# Patient Record
Sex: MALE | Race: WHITE | NOT HISPANIC OR LATINO | Employment: OTHER | URBAN - METROPOLITAN AREA
[De-identification: names, ages, dates, MRNs, and addresses within clinical notes are randomized per-mention and may not be internally consistent; named-entity substitution may affect disease eponyms.]

---

## 2023-11-22 ENCOUNTER — APPOINTMENT (OUTPATIENT)
Dept: MRI IMAGING | Facility: HOSPITAL | Age: 63
DRG: 304 | End: 2023-11-22
Payer: COMMERCIAL

## 2023-11-22 ENCOUNTER — APPOINTMENT (OUTPATIENT)
Dept: CT IMAGING | Facility: HOSPITAL | Age: 63
DRG: 304 | End: 2023-11-22
Payer: COMMERCIAL

## 2023-11-22 ENCOUNTER — HOSPITAL ENCOUNTER (INPATIENT)
Facility: HOSPITAL | Age: 63
LOS: 5 days | Discharge: HOME | DRG: 304 | End: 2023-11-27
Attending: EMERGENCY MEDICINE | Admitting: HOSPITALIST
Payer: COMMERCIAL

## 2023-11-22 DIAGNOSIS — R90.0 INTRACRANIAL MASS: ICD-10-CM

## 2023-11-22 DIAGNOSIS — H53.9 VISUAL DISTURBANCE: ICD-10-CM

## 2023-11-22 DIAGNOSIS — I16.1 HYPERTENSIVE EMERGENCY: ICD-10-CM

## 2023-11-22 DIAGNOSIS — I10 SEVERE HYPERTENSION: Primary | ICD-10-CM

## 2023-11-22 PROBLEM — G93.6 BRAIN EDEMA: Status: ACTIVE | Noted: 2023-11-22

## 2023-11-22 PROBLEM — G93.5 BRAIN COMPRESSION: Status: ACTIVE | Noted: 2023-11-22

## 2023-11-22 PROBLEM — G93.89 BRAIN MASS: Status: ACTIVE | Noted: 2023-11-22

## 2023-11-22 PROBLEM — E87.6 HYPOKALEMIA: Status: ACTIVE | Noted: 2023-11-22

## 2023-11-22 PROBLEM — Z91.199 MEDICALLY NONCOMPLIANT: Status: ACTIVE | Noted: 2023-11-22

## 2023-11-22 LAB
ALBUMIN SERPL-MCNC: 4.7 G/DL (ref 3.5–5.2)
ALBUMIN/GLOB SERPL: 2.8 G/DL
ALP SERPL-CCNC: 78 U/L (ref 39–117)
ALT SERPL W P-5'-P-CCNC: 21 U/L (ref 1–41)
ANION GAP SERPL CALCULATED.3IONS-SCNC: 11 MMOL/L (ref 5–15)
APTT PPP: 29.8 SECONDS (ref 22.7–35.4)
AST SERPL-CCNC: 19 U/L (ref 1–40)
BACTERIA UR QL AUTO: ABNORMAL /HPF
BASOPHILS # BLD AUTO: 0.07 10*3/MM3 (ref 0–0.2)
BASOPHILS NFR BLD AUTO: 1.5 % (ref 0–1.5)
BILIRUB SERPL-MCNC: 0.8 MG/DL (ref 0–1.2)
BILIRUB UR QL STRIP: NEGATIVE
BUN SERPL-MCNC: 14 MG/DL (ref 8–23)
BUN/CREAT SERPL: 13.6 (ref 7–25)
CALCIUM SPEC-SCNC: 8.9 MG/DL (ref 8.6–10.5)
CHLORIDE SERPL-SCNC: 105 MMOL/L (ref 98–107)
CLARITY UR: CLEAR
CO2 SERPL-SCNC: 23 MMOL/L (ref 22–29)
COLOR UR: YELLOW
CREAT SERPL-MCNC: 1.03 MG/DL (ref 0.76–1.27)
DEPRECATED RDW RBC AUTO: 41.8 FL (ref 37–54)
EGFRCR SERPLBLD CKD-EPI 2021: 82.1 ML/MIN/1.73
EOSINOPHIL # BLD AUTO: 0.16 10*3/MM3 (ref 0–0.4)
EOSINOPHIL NFR BLD AUTO: 3.4 % (ref 0.3–6.2)
ERYTHROCYTE [DISTWIDTH] IN BLOOD BY AUTOMATED COUNT: 13.3 % (ref 12.3–15.4)
GLOBULIN UR ELPH-MCNC: 1.7 GM/DL
GLUCOSE SERPL-MCNC: 97 MG/DL (ref 65–99)
GLUCOSE UR STRIP-MCNC: NEGATIVE MG/DL
HCT VFR BLD AUTO: 43.7 % (ref 37.5–51)
HGB BLD-MCNC: 14.8 G/DL (ref 13–17.7)
HGB UR QL STRIP.AUTO: ABNORMAL
HYALINE CASTS UR QL AUTO: ABNORMAL /LPF
IMM GRANULOCYTES # BLD AUTO: 0.01 10*3/MM3 (ref 0–0.05)
IMM GRANULOCYTES NFR BLD AUTO: 0.2 % (ref 0–0.5)
INR PPP: 0.94 (ref 0.9–1.1)
KETONES UR QL STRIP: NEGATIVE
LEUKOCYTE ESTERASE UR QL STRIP.AUTO: ABNORMAL
LYMPHOCYTES # BLD AUTO: 0.96 10*3/MM3 (ref 0.7–3.1)
LYMPHOCYTES NFR BLD AUTO: 20.4 % (ref 19.6–45.3)
MAGNESIUM SERPL-MCNC: 2.2 MG/DL (ref 1.6–2.4)
MCH RBC QN AUTO: 29.3 PG (ref 26.6–33)
MCHC RBC AUTO-ENTMCNC: 33.9 G/DL (ref 31.5–35.7)
MCV RBC AUTO: 86.5 FL (ref 79–97)
MONOCYTES # BLD AUTO: 0.45 10*3/MM3 (ref 0.1–0.9)
MONOCYTES NFR BLD AUTO: 9.6 % (ref 5–12)
NEUTROPHILS NFR BLD AUTO: 3.06 10*3/MM3 (ref 1.7–7)
NEUTROPHILS NFR BLD AUTO: 64.9 % (ref 42.7–76)
NITRITE UR QL STRIP: NEGATIVE
NRBC BLD AUTO-RTO: 0 /100 WBC (ref 0–0.2)
PH UR STRIP.AUTO: 7 [PH] (ref 5–8)
PLATELET # BLD AUTO: 175 10*3/MM3 (ref 140–450)
PMV BLD AUTO: 10.2 FL (ref 6–12)
POTASSIUM SERPL-SCNC: 3.3 MMOL/L (ref 3.5–5.2)
PROT SERPL-MCNC: 6.4 G/DL (ref 6–8.5)
PROT UR QL STRIP: NEGATIVE
PROTHROMBIN TIME: 12.7 SECONDS (ref 11.7–14.2)
QT INTERVAL: 424 MS
QTC INTERVAL: 490 MS
RBC # BLD AUTO: 5.05 10*6/MM3 (ref 4.14–5.8)
RBC # UR STRIP: ABNORMAL /HPF
REF LAB TEST METHOD: ABNORMAL
SODIUM SERPL-SCNC: 139 MMOL/L (ref 136–145)
SP GR UR STRIP: 1.01 (ref 1–1.03)
SQUAMOUS #/AREA URNS HPF: ABNORMAL /HPF
T4 FREE SERPL-MCNC: 0.94 NG/DL (ref 0.93–1.7)
TROPONIN T SERPL HS-MCNC: 8 NG/L
TSH SERPL DL<=0.05 MIU/L-ACNC: 3.96 UIU/ML (ref 0.27–4.2)
UROBILINOGEN UR QL STRIP: ABNORMAL
WBC # UR STRIP: ABNORMAL /HPF
WBC NRBC COR # BLD AUTO: 4.71 10*3/MM3 (ref 3.4–10.8)

## 2023-11-22 PROCEDURE — 84439 ASSAY OF FREE THYROXINE: CPT | Performed by: EMERGENCY MEDICINE

## 2023-11-22 PROCEDURE — 85730 THROMBOPLASTIN TIME PARTIAL: CPT | Performed by: EMERGENCY MEDICINE

## 2023-11-22 PROCEDURE — 85610 PROTHROMBIN TIME: CPT | Performed by: EMERGENCY MEDICINE

## 2023-11-22 PROCEDURE — 70553 MRI BRAIN STEM W/O & W/DYE: CPT

## 2023-11-22 PROCEDURE — 80050 GENERAL HEALTH PANEL: CPT | Performed by: EMERGENCY MEDICINE

## 2023-11-22 PROCEDURE — 84484 ASSAY OF TROPONIN QUANT: CPT | Performed by: EMERGENCY MEDICINE

## 2023-11-22 PROCEDURE — 36415 COLL VENOUS BLD VENIPUNCTURE: CPT

## 2023-11-22 PROCEDURE — 99285 EMERGENCY DEPT VISIT HI MDM: CPT

## 2023-11-22 PROCEDURE — A9577 INJ MULTIHANCE: HCPCS | Performed by: HOSPITALIST

## 2023-11-22 PROCEDURE — 99222 1ST HOSP IP/OBS MODERATE 55: CPT | Performed by: STUDENT IN AN ORGANIZED HEALTH CARE EDUCATION/TRAINING PROGRAM

## 2023-11-22 PROCEDURE — 70450 CT HEAD/BRAIN W/O DYE: CPT

## 2023-11-22 PROCEDURE — 99222 1ST HOSP IP/OBS MODERATE 55: CPT | Performed by: NURSE PRACTITIONER

## 2023-11-22 PROCEDURE — 0 GADOBENATE DIMEGLUMINE 529 MG/ML SOLUTION: Performed by: HOSPITALIST

## 2023-11-22 PROCEDURE — 81001 URINALYSIS AUTO W/SCOPE: CPT | Performed by: EMERGENCY MEDICINE

## 2023-11-22 PROCEDURE — 87186 SC STD MICRODIL/AGAR DIL: CPT | Performed by: INTERNAL MEDICINE

## 2023-11-22 PROCEDURE — 87086 URINE CULTURE/COLONY COUNT: CPT | Performed by: INTERNAL MEDICINE

## 2023-11-22 PROCEDURE — 87077 CULTURE AEROBIC IDENTIFY: CPT | Performed by: INTERNAL MEDICINE

## 2023-11-22 PROCEDURE — 93005 ELECTROCARDIOGRAM TRACING: CPT | Performed by: EMERGENCY MEDICINE

## 2023-11-22 PROCEDURE — 83735 ASSAY OF MAGNESIUM: CPT | Performed by: EMERGENCY MEDICINE

## 2023-11-22 PROCEDURE — 93010 ELECTROCARDIOGRAM REPORT: CPT | Performed by: INTERNAL MEDICINE

## 2023-11-22 PROCEDURE — 25010000002 LABETALOL 5 MG/ML SOLUTION: Performed by: EMERGENCY MEDICINE

## 2023-11-22 PROCEDURE — 25010000002 DEXAMETHASONE PER 1 MG: Performed by: NURSE PRACTITIONER

## 2023-11-22 RX ORDER — ONDANSETRON 2 MG/ML
4 INJECTION INTRAMUSCULAR; INTRAVENOUS EVERY 6 HOURS PRN
Status: DISCONTINUED | OUTPATIENT
Start: 2023-11-22 | End: 2023-11-27 | Stop reason: HOSPADM

## 2023-11-22 RX ORDER — NITROGLYCERIN 0.4 MG/1
0.4 TABLET SUBLINGUAL
Status: DISCONTINUED | OUTPATIENT
Start: 2023-11-22 | End: 2023-11-27 | Stop reason: HOSPADM

## 2023-11-22 RX ORDER — UREA 10 %
3 LOTION (ML) TOPICAL NIGHTLY PRN
Status: DISCONTINUED | OUTPATIENT
Start: 2023-11-22 | End: 2023-11-27 | Stop reason: HOSPADM

## 2023-11-22 RX ORDER — LABETALOL HYDROCHLORIDE 5 MG/ML
10 INJECTION, SOLUTION INTRAVENOUS
Status: DISCONTINUED | OUTPATIENT
Start: 2023-11-22 | End: 2023-11-27 | Stop reason: HOSPADM

## 2023-11-22 RX ORDER — AMLODIPINE BESYLATE 5 MG/1
5 TABLET ORAL
Status: DISCONTINUED | OUTPATIENT
Start: 2023-11-22 | End: 2023-11-25

## 2023-11-22 RX ORDER — FAMOTIDINE 10 MG/ML
20 INJECTION, SOLUTION INTRAVENOUS EVERY 12 HOURS SCHEDULED
Status: DISCONTINUED | OUTPATIENT
Start: 2023-11-22 | End: 2023-11-27 | Stop reason: HOSPADM

## 2023-11-22 RX ORDER — ACETAMINOPHEN 325 MG/1
650 TABLET ORAL EVERY 4 HOURS PRN
Status: DISCONTINUED | OUTPATIENT
Start: 2023-11-22 | End: 2023-11-27 | Stop reason: HOSPADM

## 2023-11-22 RX ORDER — LEVETIRACETAM 500 MG/1
500 TABLET ORAL 2 TIMES DAILY
Status: DISCONTINUED | OUTPATIENT
Start: 2023-11-22 | End: 2023-11-27 | Stop reason: HOSPADM

## 2023-11-22 RX ORDER — DEXAMETHASONE SODIUM PHOSPHATE 4 MG/ML
4 INJECTION, SOLUTION INTRA-ARTICULAR; INTRALESIONAL; INTRAMUSCULAR; INTRAVENOUS; SOFT TISSUE EVERY 6 HOURS
Status: DISCONTINUED | OUTPATIENT
Start: 2023-11-22 | End: 2023-11-27 | Stop reason: HOSPADM

## 2023-11-22 RX ORDER — LABETALOL HYDROCHLORIDE 5 MG/ML
10 INJECTION, SOLUTION INTRAVENOUS ONCE
Status: COMPLETED | OUTPATIENT
Start: 2023-11-22 | End: 2023-11-22

## 2023-11-22 RX ORDER — POTASSIUM CHLORIDE 750 MG/1
40 TABLET, FILM COATED, EXTENDED RELEASE ORAL ONCE
Status: COMPLETED | OUTPATIENT
Start: 2023-11-22 | End: 2023-11-22

## 2023-11-22 RX ORDER — ONDANSETRON 4 MG/1
4 TABLET, FILM COATED ORAL EVERY 6 HOURS PRN
Status: DISCONTINUED | OUTPATIENT
Start: 2023-11-22 | End: 2023-11-27 | Stop reason: HOSPADM

## 2023-11-22 RX ADMIN — FAMOTIDINE 20 MG: 10 INJECTION INTRAVENOUS at 22:54

## 2023-11-22 RX ADMIN — AMLODIPINE BESYLATE 5 MG: 5 TABLET ORAL at 18:25

## 2023-11-22 RX ADMIN — LABETALOL HYDROCHLORIDE 10 MG: 5 INJECTION, SOLUTION INTRAVENOUS at 11:48

## 2023-11-22 RX ADMIN — DEXAMETHASONE SODIUM PHOSPHATE 4 MG: 4 INJECTION, SOLUTION INTRA-ARTICULAR; INTRALESIONAL; INTRAMUSCULAR; INTRAVENOUS; SOFT TISSUE at 22:54

## 2023-11-22 RX ADMIN — POTASSIUM CHLORIDE 40 MEQ: 750 TABLET, EXTENDED RELEASE ORAL at 18:25

## 2023-11-22 RX ADMIN — GADOBENATE DIMEGLUMINE 20 ML: 529 INJECTION, SOLUTION INTRAVENOUS at 15:55

## 2023-11-22 RX ADMIN — DEXAMETHASONE SODIUM PHOSPHATE 4 MG: 4 INJECTION, SOLUTION INTRA-ARTICULAR; INTRALESIONAL; INTRAMUSCULAR; INTRAVENOUS; SOFT TISSUE at 18:25

## 2023-11-22 RX ADMIN — LEVETIRACETAM 500 MG: 500 TABLET, FILM COATED ORAL at 21:24

## 2023-11-22 NOTE — ED NOTES
"Nursing report ED to floor  Chato Potts  63 y.o.  male    HPI :   Chief Complaint   Patient presents with    Blurred Vision       Admitting doctor:   Jeremias Anglin MD    Admitting diagnosis:   The primary encounter diagnosis was Severe hypertension. Diagnoses of Intracranial mass and Visual disturbance were also pertinent to this visit.    Code status:   Current Code Status       Date Active Code Status Order ID Comments User Context       Not on file            Allergies:   Patient has no known allergies.    Isolation:   No active isolations    Intake and Output  No intake or output data in the 24 hours ending 11/22/23 1429    Weight:       11/22/23  1033   Weight: 102 kg (225 lb)       Most recent vitals:   Vitals:    11/22/23 1033 11/22/23 1044 11/22/23 1148 11/22/23 1317   BP:  (!) 205/128 (!) 196/123 (!) 173/112   Patient Position:  Lying     Pulse: 109  97 77   Resp: 19   16   Temp: 97.9 °F (36.6 °C)      TempSrc: Tympanic      SpO2: 97%   95%   Weight: 102 kg (225 lb)      Height: 188 cm (74\")          Active LDAs/IV Access:   Lines, Drains & Airways       Active LDAs       Name Placement date Placement time Site Days    Peripheral IV 11/22/23 1145 Anterior;Distal;Right;Upper Arm 11/22/23  1145  Arm  less than 1                    Labs (abnormal labs have a star):   Labs Reviewed   COMPREHENSIVE METABOLIC PANEL - Abnormal; Notable for the following components:       Result Value    Potassium 3.3 (*)     All other components within normal limits    Narrative:     GFR Normal >60  Chronic Kidney Disease <60  Kidney Failure <15     URINALYSIS W/ MICROSCOPIC IF INDICATED (NO CULTURE) - Abnormal; Notable for the following components:    Blood, UA Trace (*)     Leuk Esterase, UA Moderate (2+) (*)     All other components within normal limits   URINALYSIS, MICROSCOPIC ONLY - Abnormal; Notable for the following components:    WBC, UA Too Numerous to Count (*)     Bacteria, UA 2+ (*)     All other components within " normal limits   PROTIME-INR - Normal   APTT - Normal   SINGLE HSTROPONIN T - Normal    Narrative:     High Sensitive Troponin T Reference Range:  <14.0 ng/L- Negative Female for AMI  <22.0 ng/L- Negative Male for AMI  >=14 - Abnormal Female indicating possible myocardial injury.  >=22 - Abnormal Male indicating possible myocardial injury.   Clinicians would have to utilize clinical acumen, EKG, Troponin, and serial changes to determine if it is an Acute Myocardial Infarction or myocardial injury due to an underlying chronic condition.        MAGNESIUM - Normal   TSH - Normal   T4, FREE - Normal    Narrative:     Results may be falsely increased if patient taking Biotin.     CBC WITH AUTO DIFFERENTIAL - Normal   CBC AND DIFFERENTIAL    Narrative:     The following orders were created for panel order CBC & Differential.  Procedure                               Abnormality         Status                     ---------                               -----------         ------                     CBC Auto Differential[[id number]]        Normal              Final result                 Please view results for these tests on the individual orders.       EKG:   ECG 12 Lead Stroke Evaluation   Preliminary Result   HEART RATE= 80  bpm   RR Interval= 750  ms   CT Interval= 180  ms   P Horizontal Axis= -67  deg   P Front Axis= 18  deg   QRSD Interval= 151  ms   QT Interval= 424  ms   QTcB= 490  ms   QRS Axis= -17  deg   T Wave Axis= 1  deg   - ABNORMAL ECG -   Sinus rhythm   Probable left atrial enlargement   Right bundle branch block   Electronically Signed By:    Date and Time of Study: 2023-11-22 11:57:29      SCANNED - TELEMETRY     Final Result          Meds given in ED:   Medications   iopamidol (ISOVUE-370) 76 % injection 95 mL (has no administration in time range)   labetalol (NORMODYNE,TRANDATE) injection 10 mg (10 mg Intravenous Given 11/22/23 1148)       Imaging results:  CT Head Without Contrast    Result Date:  11/22/2023  1. Large left parieto-occipital supratentorial hyperdense and partially calcified mass as discussed above. 2. This mass could conceivably represent a meningioma posterior arising from the tentorium or falx but further evaluation recommended with MRI of the brain with contrast. 3. Also correlation to any prior outside studies would be helpful. 4. Findings were discussed with the ER physician.  Radiation dose reduction techniques were utilized, including automated exposure control and exposure modulation based on body size.        Ambulatory status:   - indep    Social issues:   Social History     Socioeconomic History    Marital status: Single   Tobacco Use    Smoking status: Never    Smokeless tobacco: Never   Substance and Sexual Activity    Alcohol use: Never    Drug use: Never    Sexual activity: Defer       NIH Stroke Scale:       Esthela New RN  11/22/23 14:29 EST

## 2023-11-22 NOTE — CONSULTS
"Neurology Consult Note    Consult Date: 11/22/2023    Referring MD: No ref. provider found    Reason for Consult I have been asked to see the patient in neurological consultation to render advice and opinion regarding blurry vision in both eyes    Chato Potts is a 63 y.o. male with no past medical history and does not take any medications.  Patient has been having intermittent blurry vision in both eyes for the past several weeks plan for an ophthalmology evaluation and was found to have bilateral papilledema.  He was told to go to the ER for further evaluation of his vision problems.  He states at present he does not have any blurry vision but he has episodic blurry vision in both eyes.     He denies any headache, weakness numbness or speech problems  Never had a stroke or a TIA  He never had blood clots in his legs or chest    History reviewed. No pertinent past medical history.    Exam  BP (!) 205/128 (Patient Position: Lying)   Pulse 109   Temp 97.9 °F (36.6 °C) (Tympanic)   Resp 19   Ht 188 cm (74\")   Wt 102 kg (225 lb)   SpO2 97%   BMI 28.89 kg/m²   Gen: NAD, vitals reviewed  MS: oriented x3, recent/remote memory intact, normal attention/concentration, language intact, no neglect.  CN: Right eye superior nasal quadrant decreased visual acuity and left eye nasal quadrants decreased visual acuity, PERRL, EOMI, no facial droop, no dysarthria  Motor: 5/5 throughout upper and lower extremities, normal tone    DATA:    No results found for: \"GLUCOSE\", \"CALCIUM\", \"NA\", \"K\", \"CO2\", \"CL\", \"BUN\", \"CREATININE\", \"EGFRIFAFRI\", \"EGFRIFNONA\", \"BCR\", \"ANIONGAP\"  No results found for: \"WBC\", \"HGB\", \"HCT\", \"MCV\", \"PLT\"    Lab review:   Sodium 139  Creatinine 1.03  Normal LFTs    TSH 3.96    WBC 4.71  Hemoglobin 14.8 and platelets 175      Imaging review:     MRI brain-large partially calcified dural based mass in the left parietal and occipital lobes    Diagnoses:  Large partially calcified dural based mass on the left " bilateral occipital lobes  Papilledema  Hypertensive urgency    Pre-stroke MRS: 0  NIHSS: 1    Patient is a 63-year-old with no past medical history was having ongoing episodes of episodic blurry vision for which she got evaluated by ophthalmologist who noticed papilledema and advised him to go to the ER for further evaluation he got an MRI brain with and without it shows a large dural based calcified mass in the left parietal occipital lobe.    His blood pressure also seems to be elevated and he was complaining of burning micturition and had a urine analysis which was positive on admission.    Plan  -Systolic blood pressure goal less than 150  -Neurosurgery has been consulted and started on Keppra prophylaxis for 500 mg twice daily and dexamethasone 4 mg every 6  -CT abdomen pelvis with chest with and without contrast has been ordered.      No further workup from neurology will sign off, kindly let us know if any further questions      MDM   Reviewed: Previous charts, nursing notes and vitals   Reviewed: Previous labs and CT scan and MRI scan   Interpretation: Labs and CT scan and MRI scan  Total time providing care is :30-74 minutes. This excluded time spent performing separately reportable procedures and services  Consults :Neurology/Stroke    Please note that portions of this note were completed with a voice recognition program.     Pierre Rondon MD  Neuro Hospitalist /Vascular Neurology.

## 2023-11-22 NOTE — PLAN OF CARE
Problem: Adult Inpatient Plan of Care  Goal: Plan of Care Review  11/22/2023 1817 by Francesca Pringle RN  Outcome: Ongoing, Progressing  Flowsheets (Taken 11/22/2023 1817)  Plan of Care Reviewed With:   patient   spouse  11/22/2023 1713 by Francesca Pringle RN  Flowsheets (Taken 11/22/2023 1713)  Plan of Care Reviewed With:   patient   spouse  Goal: Patient-Specific Goal (Individualized)  Recent Flowsheet Documentation  Taken 11/22/2023 1753 by Francesca Pringle RN  Patient-Specific Goals (Include Timeframe): go home  Individualized Care Needs: to be self sufficient  Anxieties, Fears or Concerns: no  11/22/2023 1713 by Francesca Pringle RN  Outcome: Ongoing, Progressing  Goal: Optimal Comfort and Wellbeing  Outcome: Ongoing, Progressing   Goal Outcome Evaluation:  Plan of Care Reviewed With: patient, spouse

## 2023-11-22 NOTE — PLAN OF CARE
Problem: Adult Inpatient Plan of Care  Goal: Patient-Specific Goal (Individualized)  Recent Flowsheet Documentation  Taken 11/22/2023 1753 by Francesca Pringle, RN  Patient-Specific Goals (Include Timeframe): go home  Individualized Care Needs: to be self sufficient  Anxieties, Fears or Concerns: no  11/22/2023 1713 by Francesca Pringle, RN  Outcome: Ongoing, Progressing   Goal Outcome Evaluation:  Plan of Care Reviewed With: patient, spouse

## 2023-11-22 NOTE — ED PROVIDER NOTES
EMERGENCY DEPARTMENT ENCOUNTER    Room Number:  18/18  PCP: Provider, No Known  Historian: Patient      HPI:  Chief Complaint: Intermittent blurry vision, hypertension  A complete HPI/ROS/PMH/PSH/SH/FH are unobtainable due to: None    Context: Chato Potts is a 63 y.o. male who presents to the ED after being referred from eye doctor for intermittent blurry vision for the last month and high blood pressure.  He was told by the eye doctor that did not have any abnormal exam findings but they are worried that potentially he could have nerve swelling although I did not visualize this in the office.  They told him they were worried about potentially having cancer.  Patient denies any headaches, dizziness, chest pains, shortness of breath, numbness or weakness of the arms or legs, balance issues.  No recent falls or head injuries.  Not on any current medications.      MEDICAL RECORD REVIEW    External (non-ED) record review: No prior charts for review in epic    PAST MEDICAL HISTORY  Active Ambulatory Problems     Diagnosis Date Noted    No Active Ambulatory Problems     Resolved Ambulatory Problems     Diagnosis Date Noted    No Resolved Ambulatory Problems     No Additional Past Medical History         PAST SURGICAL HISTORY  History reviewed. No pertinent surgical history.      FAMILY HISTORY  History reviewed. No pertinent family history.      SOCIAL HISTORY  Social History     Socioeconomic History    Marital status: Single   Tobacco Use    Smoking status: Never    Smokeless tobacco: Never   Substance and Sexual Activity    Alcohol use: Never    Drug use: Never    Sexual activity: Defer         ALLERGIES  Patient has no known allergies.        REVIEW OF SYSTEMS  Review of Systems     All systems reviewed and negative except for those discussed in HPI.       PHYSICAL EXAM    I have reviewed the triage vital signs and nursing notes.    ED Triage Vitals   Temp Heart Rate Resp BP SpO2   11/22/23 1033 11/22/23 1033 11/22/23  1033 11/22/23 1044 11/22/23 1033   97.9 °F (36.6 °C) 109 19 (!) 205/128 97 %      Temp src Heart Rate Source Patient Position BP Location FiO2 (%)   11/22/23 1033 11/22/23 1033 11/22/23 1044 -- --   Tympanic Monitor Lying         Physical Exam  General: No acute distress, nontoxic  HEENT: Mucous membranes moist, atraumatic, EOMI, PERRL  Neck: Full ROM  Pulm: Symmetric chest rise, nonlabored, lungs CTAB  Cardiovascular: Regular rate and rhythm, intact distal pulses  GI: Soft, nontender, nondistended, no rebound, no guarding, bowel sounds present  MSK: Full ROM, no deformity  Skin: Warm, dry  Neuro: Awake, alert, oriented x 4, GCS 15, no facial droop, no dysarthria or aphasia, 5 out of 5 strength sensation bilateral upper and lower extremities, no pronator drift moving all extremities, no focal deficits  Psych: Calm, cooperative      NIH 0    LAB RESULTS  Recent Results (from the past 24 hour(s))   Comprehensive Metabolic Panel    Collection Time: 11/22/23 11:29 AM    Specimen: Blood   Result Value Ref Range    Glucose 97 65 - 99 mg/dL    BUN 14 8 - 23 mg/dL    Creatinine 1.03 0.76 - 1.27 mg/dL    Sodium 139 136 - 145 mmol/L    Potassium 3.3 (L) 3.5 - 5.2 mmol/L    Chloride 105 98 - 107 mmol/L    CO2 23.0 22.0 - 29.0 mmol/L    Calcium 8.9 8.6 - 10.5 mg/dL    Total Protein 6.4 6.0 - 8.5 g/dL    Albumin 4.7 3.5 - 5.2 g/dL    ALT (SGPT) 21 1 - 41 U/L    AST (SGOT) 19 1 - 40 U/L    Alkaline Phosphatase 78 39 - 117 U/L    Total Bilirubin 0.8 0.0 - 1.2 mg/dL    Globulin 1.7 gm/dL    A/G Ratio 2.8 g/dL    BUN/Creatinine Ratio 13.6 7.0 - 25.0    Anion Gap 11.0 5.0 - 15.0 mmol/L    eGFR 82.1 >60.0 mL/min/1.73   Protime-INR    Collection Time: 11/22/23 11:29 AM    Specimen: Blood   Result Value Ref Range    Protime 12.7 11.7 - 14.2 Seconds    INR 0.94 0.90 - 1.10   aPTT    Collection Time: 11/22/23 11:29 AM    Specimen: Blood   Result Value Ref Range    PTT 29.8 22.7 - 35.4 seconds   Single High Sensitivity Troponin T     Collection Time: 11/22/23 11:29 AM    Specimen: Blood   Result Value Ref Range    HS Troponin T 8 <22 ng/L   Magnesium    Collection Time: 11/22/23 11:29 AM    Specimen: Blood   Result Value Ref Range    Magnesium 2.2 1.6 - 2.4 mg/dL   TSH    Collection Time: 11/22/23 11:29 AM    Specimen: Blood   Result Value Ref Range    TSH 3.960 0.270 - 4.200 uIU/mL   T4, Free    Collection Time: 11/22/23 11:29 AM    Specimen: Blood   Result Value Ref Range    Free T4 0.94 0.93 - 1.70 ng/dL   CBC Auto Differential    Collection Time: 11/22/23 11:29 AM    Specimen: Blood   Result Value Ref Range    WBC 4.71 3.40 - 10.80 10*3/mm3    RBC 5.05 4.14 - 5.80 10*6/mm3    Hemoglobin 14.8 13.0 - 17.7 g/dL    Hematocrit 43.7 37.5 - 51.0 %    MCV 86.5 79.0 - 97.0 fL    MCH 29.3 26.6 - 33.0 pg    MCHC 33.9 31.5 - 35.7 g/dL    RDW 13.3 12.3 - 15.4 %    RDW-SD 41.8 37.0 - 54.0 fl    MPV 10.2 6.0 - 12.0 fL    Platelets 175 140 - 450 10*3/mm3    Neutrophil % 64.9 42.7 - 76.0 %    Lymphocyte % 20.4 19.6 - 45.3 %    Monocyte % 9.6 5.0 - 12.0 %    Eosinophil % 3.4 0.3 - 6.2 %    Basophil % 1.5 0.0 - 1.5 %    Immature Grans % 0.2 0.0 - 0.5 %    Neutrophils, Absolute 3.06 1.70 - 7.00 10*3/mm3    Lymphocytes, Absolute 0.96 0.70 - 3.10 10*3/mm3    Monocytes, Absolute 0.45 0.10 - 0.90 10*3/mm3    Eosinophils, Absolute 0.16 0.00 - 0.40 10*3/mm3    Basophils, Absolute 0.07 0.00 - 0.20 10*3/mm3    Immature Grans, Absolute 0.01 0.00 - 0.05 10*3/mm3    nRBC 0.0 0.0 - 0.2 /100 WBC   ECG 12 Lead Stroke Evaluation    Collection Time: 11/22/23 11:57 AM   Result Value Ref Range    QT Interval 424 ms    QTC Interval 490 ms   Urinalysis With Microscopic If Indicated (No Culture) - Urine, Clean Catch    Collection Time: 11/22/23  1:18 PM    Specimen: Urine, Clean Catch   Result Value Ref Range    Color, UA Yellow Yellow, Straw    Appearance, UA Clear Clear    pH, UA 7.0 5.0 - 8.0    Specific Gravity, UA 1.008 1.005 - 1.030    Glucose, UA Negative Negative     Ketones, UA Negative Negative    Bilirubin, UA Negative Negative    Blood, UA Trace (A) Negative    Protein, UA Negative Negative    Leuk Esterase, UA Moderate (2+) (A) Negative    Nitrite, UA Negative Negative    Urobilinogen, UA 0.2 E.U./dL 0.2 - 1.0 E.U./dL   Urinalysis, Microscopic Only - Urine, Clean Catch    Collection Time: 11/22/23  1:18 PM    Specimen: Urine, Clean Catch   Result Value Ref Range    RBC, UA 0-2 None Seen, 0-2 /HPF    WBC, UA Too Numerous to Count (A) None Seen, 0-2 /HPF    Bacteria, UA 2+ (A) None Seen /HPF    Squamous Epithelial Cells, UA None Seen None Seen, 0-2 /HPF    Hyaline Casts, UA None Seen None Seen /LPF    Methodology Manual Light Microscopy        Ordered the above labs and independently interpreted results. My findings will be discussed in the medical decision making section below        RADIOLOGY  CT Head Without Contrast    Result Date: 11/22/2023  CT OF THE BRAIN WITHOUT CONTRAST 11/22/2023  HISTORY: Blurred vision. Headache.  Axial images were obtained through the brain without intravenous contrast.  There is a large hyperdense and partially calcified left supratentorial mass which abuts the posterior falx and leftward aspect of the tentorium. The mass is seen in the posterior parieto-occipital region. It is difficult to determine if it is extra-axial or intra-axial. The mass measures up to 7.6 cm in craniocaudal dimension x 7.4 cm in AP dimension x 5.9 cm in transverse dimension. Small amount of surrounding edema is seen.  No other masses are seen. There is approximately 8 mm midline shift to the right. No intracranial hemorrhage is seen.  Ventricles do not appear significantly dilated. No bony lesions are seen.      1. Large left parieto-occipital supratentorial hyperdense and partially calcified mass as discussed above. 2. This mass could conceivably represent a meningioma posterior arising from the tentorium or falx but further evaluation recommended with MRI of the  brain with contrast. 3. Also correlation to any prior outside studies would be helpful. 4. Findings were discussed with the ER physician.  Radiation dose reduction techniques were utilized, including automated exposure control and exposure modulation based on body size.        Ordered the above noted radiological studies.  Independently interpreted by me and my independent review of findings can be found in the ED Course.  See dictation for official radiology interpretation.      PROCEDURES    Critical Care    Performed by: Greg Person MD  Authorized by: Greg Person MD    Critical care provider statement:     Critical care time (minutes):  35    Critical care time was exclusive of:  Separately billable procedures and treating other patients    Critical care was necessary to treat or prevent imminent or life-threatening deterioration of the following conditions:  CNS failure or compromise and circulatory failure    Critical care was time spent personally by me on the following activities:  Development of treatment plan with patient or surrogate, discussions with consultants, evaluation of patient's response to treatment, examination of patient, obtaining history from patient or surrogate, ordering and performing treatments and interventions, ordering and review of laboratory studies, ordering and review of radiographic studies, pulse oximetry, re-evaluation of patient's condition and review of old charts    Care discussed with: admitting provider      Care discussed with comment:  Dr. Anglin, admitting; Dr. Rondon, Neurology; Dr. Lane, Radiology; JACQUELINE Harvey, Neurosurgery    EKG - Per my independent interpretation at 12:00:    EKG Time: 1157  Rhythm/Rate: Sinus rhythm with rate of 80  Left axis deviation  Right bundle branch block  No acute ischemic changes  No STEMI       No prior for visual comparison      MEDICATIONS GIVEN IN ER    Medications   iopamidol (ISOVUE-370) 76 % injection 95 mL (has  no administration in time range)   labetalol (NORMODYNE,TRANDATE) injection 10 mg (10 mg Intravenous Given 11/22/23 1148)         PROGRESS, DATA ANALYSIS, CONSULTS, AND MEDICAL DECISION MAKING    Please note that this section constitutes my independent interpretation of clinical data including lab results, radiology, EKG's.  This constitutes my independent professional opinion regarding differential diagnosis and management of this patient.  It may include any factors such as history from outside sources, review of external records, social determinants of health, management of medications, response to those treatments, and discussions with other providers.    Differential Diagnosis and Plan: Plan for stat neurology consult, likely neuroimaging of some sort, labs, blood pressure control, EKG, and reevaluation with results.  Initial concern for possible hypertensive urgency, intracranial mass, renal failure, electrolyte abnormalities, among others.      Additional sources:  - Discussed/ obtained information from independent historians:       - Chronic or social conditions impacting care:      - Shared decision making:  Patient fully updated on and in agreement with the course and plan moving forward    ED Course as of 11/22/23 1428   Wed Nov 22, 2023   1121 Discussed with Dr. Rondon, Stroke Neurology, discussed patient's clinical course and findings today, treatment modalities, he does recommend blood pressure control with IV labetalol, MRI brain without contrast, and if MR brain is reassuring with improving blood pressure and otherwise reassuring work-up, can discharge with likely hypertensive induced symptoms. [DC]   1152 WBC: 4.71 [DC]   1152 Hemoglobin: 14.8 [DC]   1152 Platelets: 175 [DC]   1205 Glucose: 97 [DC]   1205 BUN: 14 [DC]   1205 Creatinine: 1.03 [DC]   1205 Sodium: 139 [DC]   1206 Potassium(!): 3.3 [DC]   1206 ALT (SGPT): 21 [DC]   1206 AST (SGOT): 19 [DC]   1206 Alkaline Phosphatase: 78 [DC]   1206  Total Bilirubin: 0.8 [DC]   1206 INR: 0.94 [DC]   1206 PTT: 29.8 [DC]   1206 Magnesium: 2.2 [DC]   1214 Free T4: 0.94 [DC]   1214 TSH Baseline: 3.960 [DC]   1214 HS Troponin T: 8 [DC]   1404 CT Head Without Contrast  Per my independent interpretation of the CT head, patient has a large left-sided intracranial mass [DC]   1409 Discussed with JACQUELINE Harvey, neurosurgery taking call for Dr. Rodriguez, she has reviewed the CT head, recommends MR brain with and without contrast and they will consult. [DC]   1426 Discussed with Dr. Anglin, [facility], discussed patient's clinical course and findings today, treatment modalities, consultations by neurology and neurosurgery, and need for hospitalization. [DC]      ED Course User Index  [DC] Greg Person MD     Patient and family at bedside been fully updated on findings today and recommendations and need for hospitalization for further blood pressure management and work-up of the intracranial mass.  All questions and concerns addressed.  He remains well-appearing in no acute distress otherwise.    Hospitalization Considered?: yes    Orders Placed During This Visit:  Orders Placed This Encounter   Procedures    CT Head Without Contrast    MRI Brain With & Without Contrast    Comprehensive Metabolic Panel    Urinalysis With Microscopic If Indicated (No Culture) - Urine, Clean Catch    Protime-INR    aPTT    Single High Sensitivity Troponin T    Magnesium    TSH    T4, Free    CBC Auto Differential    Urinalysis, Microscopic Only - Urine, Clean Catch    Inpatient Neurology Consult Stroke    Neurosurgery (on-call MD unless specified)    [facility] (on-call MD unless specified) Details    ECG 12 Lead Stroke Evaluation    SCANNED - TELEMETRY      Inpatient Admission    CBC & Differential       Additional orders considered but not placed:      Independent interpretation of labs, radiology studies, and discussions with consultants: See ED Course        AS OF 14:28 EST VITALS:    BP - (!)  173/112  HR - 77  TEMP - 97.9 °F (36.6 °C) (Tympanic)  02 SATS - 95%        DIAGNOSIS  Final diagnoses:   Severe hypertension   Intracranial mass   Visual disturbance         DISPOSITION  HOSPITALIZATION    Discussed treatment plan and reason for hospitalization with pt/family and hospitalizing physician.  Pt/family voiced understanding of the plan for hospitalization for further testing/treatment as needed.                 --    Please note that portions of this were completed with a voice recognition program.       Note Disclaimer: At [facility], we believe that sharing information builds trust and better relationships. You are receiving this note because you are receiving care at [facility] or recently visited. It is possible you will see health information before a provider has talked with you about it. This kind of information can be easy to misunderstand. To help you fully understand what it means for your health, we urge you to discuss this note with your provider.           Greg Person MD  11/22/23 3770

## 2023-11-22 NOTE — CASE MANAGEMENT/SOCIAL WORK
ER provider notified patient's insurance is out of network with [facility]. CCP to follow up should patient require admission to the hospital today. WOO PeresN RN

## 2023-11-22 NOTE — CASE MANAGEMENT/SOCIAL WORK
In speaking with ER physician regarding admission today, patient with newly diagnosed large posterior brain mass with the possibility of midline shift. Patient is not appropriate to consider transfer at this time. DIEGO Peres RN

## 2023-11-22 NOTE — PLAN OF CARE
Problem: Adult Inpatient Plan of Care  Goal: Patient-Specific Goal (Individualized)  Outcome: Ongoing, Progressing  Goal: Optimal Comfort and Wellbeing  Outcome: Ongoing, Progressing   Goal Outcome Evaluation:  Plan of Care Reviewed With: patient, spouse

## 2023-11-22 NOTE — ED NOTES
Pt states that he has had bilateral intermittent blurred vision for the last month. States that his vision will be blurred for a few seconds and then clear up. Pt went to his eye doctor this morning and they told him to come here for possible swelling of his optic nerve.

## 2023-11-22 NOTE — H&P
"    Patient Name:  Chato Potts  YOB: 1960  MRN:  [id number]  Admit Date:  11/22/2023  Patient Care Team:  Provider, No Known as PCP - General      Subjective   History Present Illness     Chief Complaint   Patient presents with    Blurred Vision       Mr. Potts is a 63 y.o. male with a history of told he has high blood pressure that presents to [facility] complaining of altered vision.  He initially was seen at an ophthalmologist who states that they saw something concerning and is asked to send him to the emergency department to be evaluated.  He has had altered vision for about the last 4 weeks.  He states that it comes and goes.  No particular aggravating or relieving factors.  No other neurological deficits.  No complaints of headache, nausea or vomiting.  No weight loss.  He has previously been diagnosed with hypertension but noncompliant with his medications because he \"feels fine.\"      Review of Systems   Constitutional: Negative.    HENT: Negative.     Eyes:  Positive for visual disturbance.   Respiratory: Negative.     Gastrointestinal: Negative.    Endocrine: Negative.    Genitourinary: Negative.    Musculoskeletal: Negative.    Skin: Negative.    Neurological: Negative.    Hematological: Negative.    Psychiatric/Behavioral: Negative.          Personal History     History reviewed. No pertinent past medical history.  History reviewed. No pertinent surgical history.  History reviewed. No pertinent family history.  Social History     Tobacco Use    Smoking status: Never    Smokeless tobacco: Never   Substance Use Topics    Alcohol use: Never    Drug use: Never     No current facility-administered medications on file prior to encounter.     No current outpatient medications on file prior to encounter.     No Known Allergies    Objective    Objective     Vital Signs  Temp:  [97.9 °F (36.6 °C)] 97.9 °F (36.6 °C)  Heart Rate:  [] 77  Resp:  [16-19] 16  BP: (173-205)/(112-128) " 173/112  SpO2:  [95 %-97 %] 95 %  on   ;   Device (Oxygen Therapy): room air  Body mass index is 28.89 kg/m².    Physical Exam  Vitals and nursing note reviewed.   Constitutional:       Appearance: He is well-developed.   HENT:      Head: Normocephalic and atraumatic.      Nose:      Comments: Skin growth on bridge of nose  Eyes:      General: No scleral icterus.  Cardiovascular:      Rate and Rhythm: Normal rate and regular rhythm.   Pulmonary:      Effort: Pulmonary effort is normal.      Breath sounds: Normal breath sounds.   Abdominal:      General: Bowel sounds are normal. There is no distension.      Palpations: Abdomen is soft.      Tenderness: There is no abdominal tenderness.   Skin:     General: Skin is warm and dry.   Neurological:      Mental Status: He is alert. Mental status is at baseline.         Results Review:  I reviewed the patient's new clinical results.  I reviewed the patient's new imaging results and agree with the interpretation.  I reviewed the patient's other test results and agree with the interpretation  I personally viewed and interpreted the patient's EKG/Telemetry data  Discussed with ED provider.    Lab Results (last 24 hours)       Procedure Component Value Units Date/Time    CBC & Differential [[id number]]  (Normal) Collected: 11/22/23 1129    Specimen: Blood Updated: 11/22/23 1145    Narrative:      The following orders were created for panel order CBC & Differential.  Procedure                               Abnormality         Status                     ---------                               -----------         ------                     CBC Auto Differential[[id number]]        Normal              Final result                 Please view results for these tests on the individual orders.    Comprehensive Metabolic Panel [[id number]]  (Abnormal) Collected: 11/22/23 1129    Specimen: Blood Updated: 11/22/23 1203     Glucose 97 mg/dL      BUN 14 mg/dL      Creatinine 1.03 mg/dL       Sodium 139 mmol/L      Potassium 3.3 mmol/L      Chloride 105 mmol/L      CO2 23.0 mmol/L      Calcium 8.9 mg/dL      Total Protein 6.4 g/dL      Albumin 4.7 g/dL      ALT (SGPT) 21 U/L      AST (SGOT) 19 U/L      Alkaline Phosphatase 78 U/L      Total Bilirubin 0.8 mg/dL      Globulin 1.7 gm/dL      A/G Ratio 2.8 g/dL      BUN/Creatinine Ratio 13.6     Anion Gap 11.0 mmol/L      eGFR 82.1 mL/min/1.73     Narrative:      GFR Normal >60  Chronic Kidney Disease <60  Kidney Failure <15      Protime-INR [[id number]]  (Normal) Collected: 11/22/23 1129    Specimen: Blood Updated: 11/22/23 1200     Protime 12.7 Seconds      INR 0.94    aPTT [[id number]]  (Normal) Collected: 11/22/23 1129    Specimen: Blood Updated: 11/22/23 1200     PTT 29.8 seconds     Single High Sensitivity Troponin T [[id number]]  (Normal) Collected: 11/22/23 1129    Specimen: Blood Updated: 11/22/23 1210     HS Troponin T 8 ng/L     Narrative:      High Sensitive Troponin T Reference Range:  <14.0 ng/L- Negative Female for AMI  <22.0 ng/L- Negative Male for AMI  >=14 - Abnormal Female indicating possible myocardial injury.  >=22 - Abnormal Male indicating possible myocardial injury.   Clinicians would have to utilize clinical acumen, EKG, Troponin, and serial changes to determine if it is an Acute Myocardial Infarction or myocardial injury due to an underlying chronic condition.         Magnesium [[id number]]  (Normal) Collected: 11/22/23 1129    Specimen: Blood Updated: 11/22/23 1203     Magnesium 2.2 mg/dL     TSH [[id number]]  (Normal) Collected: 11/22/23 1129    Specimen: Blood Updated: 11/22/23 1210     TSH 3.960 uIU/mL     T4, Free [[id number]]  (Normal) Collected: 11/22/23 1129    Specimen: Blood Updated: 11/22/23 1210     Free T4 0.94 ng/dL     Narrative:      Results may be falsely increased if patient taking Biotin.      CBC Auto Differential [[id number]]  (Normal) Collected: 11/22/23 1129    Specimen: Blood Updated: 11/22/23 1145     WBC 4.71  10*3/mm3      RBC 5.05 10*6/mm3      Hemoglobin 14.8 g/dL      Hematocrit 43.7 %      MCV 86.5 fL      MCH 29.3 pg      MCHC 33.9 g/dL      RDW 13.3 %      RDW-SD 41.8 fl      MPV 10.2 fL      Platelets 175 10*3/mm3      Neutrophil % 64.9 %      Lymphocyte % 20.4 %      Monocyte % 9.6 %      Eosinophil % 3.4 %      Basophil % 1.5 %      Immature Grans % 0.2 %      Neutrophils, Absolute 3.06 10*3/mm3      Lymphocytes, Absolute 0.96 10*3/mm3      Monocytes, Absolute 0.45 10*3/mm3      Eosinophils, Absolute 0.16 10*3/mm3      Basophils, Absolute 0.07 10*3/mm3      Immature Grans, Absolute 0.01 10*3/mm3      nRBC 0.0 /100 WBC     Urinalysis With Microscopic If Indicated (No Culture) - Urine, Clean Catch [[id number]]  (Abnormal) Collected: 11/22/23 1318    Specimen: Urine, Clean Catch Updated: 11/22/23 1352     Color, UA Yellow     Appearance, UA Clear     pH, UA 7.0     Specific Gravity, UA 1.008     Glucose, UA Negative     Ketones, UA Negative     Bilirubin, UA Negative     Blood, UA Trace     Protein, UA Negative     Leuk Esterase, UA Moderate (2+)     Nitrite, UA Negative     Urobilinogen, UA 0.2 E.U./dL    Urinalysis, Microscopic Only - Urine, Clean Catch [[id number]]  (Abnormal) Collected: 11/22/23 1318    Specimen: Urine, Clean Catch Updated: 11/22/23 1418     RBC, UA 0-2 /HPF      WBC, UA Too Numerous to Count /HPF      Bacteria, UA 2+ /HPF      Squamous Epithelial Cells, UA None Seen /HPF      Hyaline Casts, UA None Seen /LPF      Methodology Manual Light Microscopy            Imaging Results (Last 24 Hours)       Procedure Component Value Units Date/Time    MRI Brain With & Without Contrast [[id number]] Resulted: 11/22/23 1547     Updated: 11/22/23 1547    CT Head Without Contrast [[id number]] Collected: 11/22/23 1421     Updated: 11/22/23 1421    Narrative:      CT OF THE BRAIN WITHOUT CONTRAST 11/22/2023     HISTORY: Blurred vision. Headache.     Axial images were obtained through the brain without  intravenous  contrast.     There is a large hyperdense and partially calcified left supratentorial  mass which abuts the posterior falx and leftward aspect of the  tentorium. The mass is seen in the posterior parieto-occipital region.  It is difficult to determine if it is extra-axial or intra-axial. The  mass measures up to 7.6 cm in craniocaudal dimension x 7.4 cm in AP  dimension x 5.9 cm in transverse dimension. Small amount of surrounding  edema is seen.     No other masses are seen. There is approximately 8 mm midline shift to  the right. No intracranial hemorrhage is seen.     Ventricles do not appear significantly dilated. No bony lesions are  seen.       Impression:      1. Large left parieto-occipital supratentorial hyperdense and partially  calcified mass as discussed above.  2. This mass could conceivably represent a meningioma posterior arising  from the tentorium or falx but further evaluation recommended with MRI  of the brain with contrast.  3. Also correlation to any prior outside studies would be helpful.  4. Findings were discussed with the ER physician.     Radiation dose reduction techniques were utilized, including automated  exposure control and exposure modulation based on body size.                    ECG 12 Lead Stroke Evaluation   Final Result   HEART RATE= 80  bpm   RR Interval= 750  ms   WA Interval= 180  ms   P Horizontal Axis= -67  deg   P Front Axis= 18  deg   QRSD Interval= 151  ms   QT Interval= 424  ms   QTcB= 490  ms   QRS Axis= -17  deg   T Wave Axis= 1  deg   - ABNORMAL ECG -   Sinus rhythm   Probable left atrial enlargement   Right bundle branch block   No Prior Tracing for Comparison   Electronically Signed By: Madelin Quinones ([facility]) 22-Nov-2023 14:59:09   Date and Time of Study: 2023-11-22 11:57:29      SCANNED - TELEMETRY     Final Result        Assessment/Plan   Assessment & Plan   Active Hospital Problems    Diagnosis  POA    **Hypertensive emergency [I16.1]  Yes    Brain  mass [G93.89]  Yes    Vision disturbance [H53.9]  Yes    Hypokalemia [E87.6]  Yes      Resolved Hospital Problems   No resolved problems to display.       63 y.o. male admitted with Hypertensive emergency.    Patient is severely elevated blood pressure readings with findings of papilledema on exam per neurology.  Will need to initiate antihypertensive medication.  He did receive IV labetalol in the emergency department which will be continued as needed.  Incidental finding of large mass on his brain on head CT.  Neurosurgery will evaluate.  MRI brain with contrast ordered.  Defer to neurosurgery whether indication for steroids or seizure medication.      Replace potassium.      SCDs for DVT prophylaxis.  Full code.  Discussed with patient, family, and ED provider.      Jeremias Anglin MD  [facility]  11/22/23  16:00 EST    ADDENDUM  MRI has been performed demonstrating enhancing left parietal occipital mass with midline shift and mass effect.  Neurosurgery started him on IV steroids and Keppra and states that he will need intracranial surgery.  He has no known cardiac history or complaints of chest pain.  However given his probable longstanding untreated hypertension will get EKG and echocardiogram.  If any significant abnormalities would favor cardiology consultation for formal preoperative clearance.    Electronically signed by Jeremias Anglin MD, 11/22/23, 6:35 PM EST.

## 2023-11-22 NOTE — PLAN OF CARE
Problem: Adult Inpatient Plan of Care  Goal: Patient-Specific Goal (Individualized)  Outcome: Ongoing, Progressing   Goal Outcome Evaluation:  Plan of Care Reviewed With: patient, spouse

## 2023-11-22 NOTE — PLAN OF CARE
Goal Outcome Evaluation:  Plan of Care Reviewed With: patient, spouse              64 yo went to eye dr today for blurry vision x1 mo that comes and goes. No med hx. No meds. Was sent to er from eye dr. SANDOVAL/ox4, up ad [place].

## 2023-11-22 NOTE — CONSULTS
[facility] NEUROSURGERY CONSULT NOTE    Patient name: Chato Potts  Referring Provider: Dr. Person  Reason for Consultation: brain mass    Patient Care Team:  Provider, No Known as PCP - General    Chief complaint: visual changes    Subjective .     History of present illness:    Patient is a 63 y.o. right handed male who presented to ER at the behest of his eye doctor today after findings of abnormal eye exam.  Patient went to the eye doctor for intermittent blurred vision for the last month.  He states the vision disturbance is bilateral but more so on the right than the left.  It last for a few seconds and will go away and recur intermittently.  It has become more frequent.  He denies any associated visual loss, double vision, headache, nausea, vomiting, imbalance.  He has not had any accidents or trauma.  No reported incontinence.  He denies any history of cancer.  Non-smoker.  No major medical issues.  He was found to be extremely hypertensive in the ER.    Review of Systems  Review of Systems   Constitutional:  Negative for unexpected weight change.   HENT:  Negative for trouble swallowing.    Eyes:  Positive for visual disturbance.   Respiratory:  Negative for shortness of breath.    Cardiovascular:  Negative for chest pain.   Gastrointestinal:  Negative for nausea and vomiting.   Genitourinary:  Negative for enuresis.   Musculoskeletal:  Negative for gait problem.   Neurological:  Negative for weakness, numbness and headaches.   Psychiatric/Behavioral:  Negative for confusion.        History  PAST MEDICAL HISTORY  History reviewed. No pertinent past medical history.    PAST SURGICAL HISTORY  History reviewed. No pertinent surgical history.    FAMILY HISTORY  History reviewed. No pertinent family history.    SOCIAL HISTORY  Social History     Tobacco Use    Smoking status: Never    Smokeless tobacco: Never   Substance Use Topics    Alcohol use: Never    Drug use: Never     single  retired  Lives alone.  2 brothers  live across the street from him    Allergies:  Patient has no known allergies.    MEDICATIONS:    Current Facility-Administered Medications:     dexAMETHasone (DECADRON) injection 4 mg, 4 mg, Intravenous, Q6H, Jill Kim APRN    famotidine (PEPCID) injection 20 mg, 20 mg, Intravenous, Q12H, Jill Kim APRN    iopamidol (ISOVUE-370) 76 % injection 95 mL, 95 mL, Intravenous, Once in imaging, Greg Person MD    levETIRAcetam (KEPPRA) tablet 500 mg, 500 mg, Oral, BID, Jill Kim APRN      Objective     Results Review:  LABS:  Results from last 7 days   Lab Units 11/22/23  1129   WBC 10*3/mm3 4.71   HEMOGLOBIN g/dL 14.8   HEMATOCRIT % 43.7   PLATELETS 10*3/mm3 175     Results from last 7 days   Lab Units 11/22/23  1129   SODIUM mmol/L 139   POTASSIUM mmol/L 3.3*   CHLORIDE mmol/L 105   CO2 mmol/L 23.0   BUN mg/dL 14   CREATININE mg/dL 1.03   CALCIUM mg/dL 8.9   BILIRUBIN mg/dL 0.8   ALK PHOS U/L 78   ALT (SGPT) U/L 21   AST (SGOT) U/L 19   GLUCOSE mg/dL 97     Results from last 7 days   Lab Units 11/22/23  1129   INR  0.94     Urinalysis trace blood, moderate leukocyte, TNTC WBC, 2+ bacteria    DIAGNOSTICS:  CTH- large left occipital mass with appearance of diffuse calcification. Mild surrounding edema. There is mass effect with mild MLS.    MRI brain-avidly enhancing large left parietal occipital mass with mild surrounding T2 flair changes consistent with edema.  There is localized mass effect with compression of the left lateral ventricle, occipital horn.  There is some deformity of the frontal horn of the left lateral ventricle as well.  There is mild midline shift    Results Review:   I reviewed the patient's new clinical results.  I personally viewed and interpreted the patient's CTH. Also reviewed by and d/w Dr. Rodriguez.    Vital Signs   Temp:  [97.9 °F (36.6 °C)] 97.9 °F (36.6 °C)  Heart Rate:  [] 77  Resp:  [16-19] 16  BP: (173-205)/(112-128) 173/112    Physical Exam:  Physical  Exam  Vitals reviewed.   Constitutional:       Appearance: Normal appearance.   Pulmonary:      Effort: Pulmonary effort is normal.   Skin:     General: Skin is warm and dry.   Neurological:      General: No focal deficit present.      Mental Status: He is alert.      Motor: Motor strength is normal.     Coordination: Finger-Nose-Finger Test abnormal (Right mild ataxia and past-pointing).   Psychiatric:         Mood and Affect: Mood normal.         Speech: Speech normal.         Thought Content: Thought content normal.       Neurologic Exam     Mental Status   Follows 3 step commands.   Attention: normal. Concentration: normal.   Speech: speech is normal   Level of consciousness: alert  Knowledge: good.   Normal comprehension.     Cranial Nerves     CN II   Right visual field deficit: upper temporal and lower temporal quadrant(s)  Left visual field deficit: upper nasal and lower nasal quadrant(s)    CN III, IV, VI   Right pupil: Size: 3 mm.   Left pupil: Size: 2 mm.   CN III: right CN III palsy  CN VI: no CN VI palsy  Nystagmus: none   Diplopia: none    CN V   Facial sensation intact.     CN VII   Facial expression full, symmetric.     CN VIII   CN VIII normal.     CN IX, X   CN IX normal.   CN X normal.     CN XI   CN XI normal.     CN XII   CN XII normal.     Motor Exam   Right arm pronator drift: absent  Left arm pronator drift: absent    Strength   Strength 5/5 throughout.     Sensory Exam   Light touch normal.     Gait, Coordination, and Reflexes     Gait  Gait: (Not tested due to workup for brain tumor)    Coordination   Finger to nose coordination: abnormal (Right mild ataxia and past-pointing)    Tremor   Resting tremor: absent  Intention tremor: present (Right upper extremity)  Action tremor: absent      Assessment & Plan       Hypertensive emergency    Brain mass    Vision disturbance    Hypokalemia    Patient with blurred vision x 1 month presented to ophthalmology office and there was concern about  "abnormal eye exam and he was sent to the ER.  He denies headache, nausea, vomiting.  CT head showed large left occipital mass.  On exam patient has right homonymous hemianopsia, partial cranial nerve III palsy, mild right upper extremity ataxia.  MRI brain completed showing avidly enhancing left parietal occipital mass with midline shift and localized mass effect on the left lateral ventricle, occipital horn there is no hydrocephalus.  There is minimal surrounding edema..  Overall imaging looks like a possible meningioma although a choroid plexus papilloma is a possibility or a metastatic lesion.  Will obtain systemic workup with CT chest abdomen pelvis.  Will need intracranial surgery to remove mass but if it appears to be a primary brain lesion, may need embolization of the tumor prior to craniotomy.  If felt to be a metastatic lesion, will likely not need precrani embolization.  Will make n.p.o. after midnight for Monday 11/27 in the event surgical intervention can be arranged.  His hypertension will need to be managed by hospitalist service.  He will need medical clearance for surgical intervention.    PLAN:   IV steroid + GI prophylaxis  Keppra  CT C/A/P  Possible surgical intervention early next week.  May need precrani embolization if a primary tumor is suspected based on systemic workup  N.p.o. midnight 11/27  Primary service to evaluate for surgical clearance    I discussed the patient's findings and my recommendations with patient and Dr. Rodriguez    During patient visit, I utilized appropriate personal protective equipment including gloves. Appropriate PPE was worn during the entire visit.  Hand hygiene was completed before and after.     Jill Kim, APRN  11/22/23  17:06 EST    \"Dictated utilizing Dragon dictation\".      "

## 2023-11-23 ENCOUNTER — APPOINTMENT (OUTPATIENT)
Dept: CARDIOLOGY | Facility: HOSPITAL | Age: 63
DRG: 304 | End: 2023-11-23
Payer: COMMERCIAL

## 2023-11-23 PROBLEM — N30.00 ACUTE CYSTITIS WITHOUT HEMATURIA: Status: ACTIVE | Noted: 2023-11-23

## 2023-11-23 LAB
ALBUMIN SERPL-MCNC: 4.4 G/DL (ref 3.5–5.2)
ANION GAP SERPL CALCULATED.3IONS-SCNC: 12.1 MMOL/L (ref 5–15)
AORTIC DIMENSIONLESS INDEX: 0.7 (DI)
ASCENDING AORTA: 3.4 CM
AV HCM GRAD VALS: 40 MMHG
AV LVOT PEAK GRADIENT: 14 MMHG
BH CV ECHO HCM PROVOKED MEAN GRADIENT: 4 MMHG
BH CV ECHO MEAS - ACS: 1.69 CM
BH CV ECHO MEAS - AI P1/2T: 385.5 MSEC
BH CV ECHO MEAS - AO MAX PG: 17.2 MMHG
BH CV ECHO MEAS - AO MEAN PG: 10.5 MMHG
BH CV ECHO MEAS - AO ROOT DIAM: 3.5 CM
BH CV ECHO MEAS - AO V2 MAX: 207.7 CM/SEC
BH CV ECHO MEAS - AO V2 VTI: 38.4 CM
BH CV ECHO MEAS - AVA(I,D): 2.6 CM2
BH CV ECHO MEAS - EDV(CUBED): 52.1 ML
BH CV ECHO MEAS - EDV(MOD-SP2): 138 ML
BH CV ECHO MEAS - EDV(MOD-SP4): 114 ML
BH CV ECHO MEAS - EF(MOD-BP): 66.7 %
BH CV ECHO MEAS - EF(MOD-SP2): 65.2 %
BH CV ECHO MEAS - EF(MOD-SP4): 69.3 %
BH CV ECHO MEAS - ESV(CUBED): 15.2 ML
BH CV ECHO MEAS - ESV(MOD-SP2): 48 ML
BH CV ECHO MEAS - ESV(MOD-SP4): 35 ML
BH CV ECHO MEAS - FS: 33.7 %
BH CV ECHO MEAS - IVS/LVPW: 1.06 CM
BH CV ECHO MEAS - IVSD: 1.42 CM
BH CV ECHO MEAS - LAT PEAK E' VEL: 8.9 CM/SEC
BH CV ECHO MEAS - LV MASS(C)D: 183.8 GRAMS
BH CV ECHO MEAS - LV MAX PG: 10 MMHG
BH CV ECHO MEAS - LV MEAN PG: 5.6 MMHG
BH CV ECHO MEAS - LV V1 MAX: 158.1 CM/SEC
BH CV ECHO MEAS - LV V1 VTI: 28.6 CM
BH CV ECHO MEAS - LVIDD: 3.7 CM
BH CV ECHO MEAS - LVIDS: 2.48 CM
BH CV ECHO MEAS - LVOT AREA: 3.5 CM2
BH CV ECHO MEAS - LVOT DIAM: 2.11 CM
BH CV ECHO MEAS - LVPWD: 1.33 CM
BH CV ECHO MEAS - MED PEAK E' VEL: 9.1 CM/SEC
BH CV ECHO MEAS - MV A DUR: 0.16 SEC
BH CV ECHO MEAS - MV A MAX VEL: 143.4 CM/SEC
BH CV ECHO MEAS - MV DEC SLOPE: 912.1 CM/SEC2
BH CV ECHO MEAS - MV DEC TIME: 0.11 SEC
BH CV ECHO MEAS - MV E MAX VEL: 60.9 CM/SEC
BH CV ECHO MEAS - MV E/A: 0.42
BH CV ECHO MEAS - MV MAX PG: 8.2 MMHG
BH CV ECHO MEAS - MV MEAN PG: 3.9 MMHG
BH CV ECHO MEAS - MV P1/2T: 36.7 MSEC
BH CV ECHO MEAS - MV V2 VTI: 24.3 CM
BH CV ECHO MEAS - MVA(P1/2T): 6 CM2
BH CV ECHO MEAS - MVA(VTI): 4.1 CM2
BH CV ECHO MEAS - PA ACC TIME: 0.13 SEC
BH CV ECHO MEAS - PA V2 MAX: 141.2 CM/SEC
BH CV ECHO MEAS - RF(MV,LVOT)(1DIAM): 32 CM
BH CV ECHO MEAS - RF(MV,LVOT): 17
BH CV ECHO MEAS - RV MAX PG: 5.5 MMHG
BH CV ECHO MEAS - RV V1 MAX: 117 CM/SEC
BH CV ECHO MEAS - RV V1 VTI: 22.8 CM
BH CV ECHO MEAS - SV(LVOT): 100.3 ML
BH CV ECHO MEAS - SV(MOD-SP2): 90 ML
BH CV ECHO MEAS - SV(MOD-SP4): 79 ML
BH CV ECHO MEAS - TAPSE (>1.6): 2.3 CM
BH CV ECHO MEASUREMENTS AVERAGE E/E' RATIO: 6.77
BH CV XLRA - RV BASE: 3.1 CM
BH CV XLRA - RV MID: 3.6 CM
BH CV XLRA - TDI S': 16.7 CM/SEC
BUN SERPL-MCNC: 13 MG/DL (ref 8–23)
BUN/CREAT SERPL: 13.1 (ref 7–25)
CALCIUM SPEC-SCNC: 9 MG/DL (ref 8.6–10.5)
CHLORIDE SERPL-SCNC: 108 MMOL/L (ref 98–107)
CO2 SERPL-SCNC: 20.9 MMOL/L (ref 22–29)
CREAT SERPL-MCNC: 0.99 MG/DL (ref 0.76–1.27)
EGFRCR SERPLBLD CKD-EPI 2021: 86.1 ML/MIN/1.73
GLUCOSE SERPL-MCNC: 156 MG/DL (ref 65–99)
LEFT ATRIUM VOLUME INDEX: 17.3 ML/M2
PHOSPHATE SERPL-MCNC: 2 MG/DL (ref 2.5–4.5)
POTASSIUM SERPL-SCNC: 4.2 MMOL/L (ref 3.5–5.2)
SINUS: 3.8 CM
SODIUM SERPL-SCNC: 141 MMOL/L (ref 136–145)
STJ: 3 CM

## 2023-11-23 PROCEDURE — 80069 RENAL FUNCTION PANEL: CPT | Performed by: HOSPITALIST

## 2023-11-23 PROCEDURE — 93306 TTE W/DOPPLER COMPLETE: CPT | Performed by: INTERNAL MEDICINE

## 2023-11-23 PROCEDURE — 25010000002 DEXAMETHASONE PER 1 MG: Performed by: NURSE PRACTITIONER

## 2023-11-23 PROCEDURE — 25510000001 PERFLUTREN (DEFINITY) 8.476 MG IN SODIUM CHLORIDE (PF) 0.9 % 10 ML INJECTION: Performed by: HOSPITALIST

## 2023-11-23 PROCEDURE — 93306 TTE W/DOPPLER COMPLETE: CPT

## 2023-11-23 PROCEDURE — 25010000002 CEFTRIAXONE PER 250 MG: Performed by: INTERNAL MEDICINE

## 2023-11-23 PROCEDURE — 25810000003 SODIUM CHLORIDE 0.9 % SOLUTION: Performed by: INTERNAL MEDICINE

## 2023-11-23 RX ORDER — SODIUM CHLORIDE 9 MG/ML
100 INJECTION, SOLUTION INTRAVENOUS CONTINUOUS
Status: ACTIVE | OUTPATIENT
Start: 2023-11-23 | End: 2023-11-24

## 2023-11-23 RX ORDER — LOSARTAN POTASSIUM 25 MG/1
25 TABLET ORAL
Status: DISCONTINUED | OUTPATIENT
Start: 2023-11-23 | End: 2023-11-25

## 2023-11-23 RX ADMIN — FAMOTIDINE 20 MG: 10 INJECTION INTRAVENOUS at 20:59

## 2023-11-23 RX ADMIN — AMLODIPINE BESYLATE 5 MG: 5 TABLET ORAL at 08:12

## 2023-11-23 RX ADMIN — PERFLUTREN 2 ML: 6.52 INJECTION, SUSPENSION INTRAVENOUS at 12:48

## 2023-11-23 RX ADMIN — NICARDIPINE HYDROCHLORIDE 12.5 MG/HR: 25 INJECTION, SOLUTION INTRAVENOUS at 20:25

## 2023-11-23 RX ADMIN — DEXAMETHASONE SODIUM PHOSPHATE 4 MG: 4 INJECTION, SOLUTION INTRA-ARTICULAR; INTRALESIONAL; INTRAMUSCULAR; INTRAVENOUS; SOFT TISSUE at 10:35

## 2023-11-23 RX ADMIN — DEXAMETHASONE SODIUM PHOSPHATE 4 MG: 4 INJECTION, SOLUTION INTRA-ARTICULAR; INTRALESIONAL; INTRAMUSCULAR; INTRAVENOUS; SOFT TISSUE at 05:34

## 2023-11-23 RX ADMIN — NICARDIPINE HYDROCHLORIDE 5 MG/HR: 25 INJECTION, SOLUTION INTRAVENOUS at 14:42

## 2023-11-23 RX ADMIN — LEVETIRACETAM 500 MG: 500 TABLET, FILM COATED ORAL at 08:12

## 2023-11-23 RX ADMIN — LOSARTAN POTASSIUM 25 MG: 25 TABLET, FILM COATED ORAL at 14:43

## 2023-11-23 RX ADMIN — DEXAMETHASONE SODIUM PHOSPHATE 4 MG: 4 INJECTION, SOLUTION INTRA-ARTICULAR; INTRALESIONAL; INTRAMUSCULAR; INTRAVENOUS; SOFT TISSUE at 23:07

## 2023-11-23 RX ADMIN — DEXAMETHASONE SODIUM PHOSPHATE 4 MG: 4 INJECTION, SOLUTION INTRA-ARTICULAR; INTRALESIONAL; INTRAMUSCULAR; INTRAVENOUS; SOFT TISSUE at 17:21

## 2023-11-23 RX ADMIN — CEFTRIAXONE SODIUM 1000 MG: 1 INJECTION, POWDER, FOR SOLUTION INTRAMUSCULAR; INTRAVENOUS at 10:35

## 2023-11-23 RX ADMIN — LEVETIRACETAM 500 MG: 500 TABLET, FILM COATED ORAL at 20:59

## 2023-11-23 RX ADMIN — NICARDIPINE HYDROCHLORIDE 12.5 MG/HR: 25 INJECTION, SOLUTION INTRAVENOUS at 17:40

## 2023-11-23 RX ADMIN — SODIUM CHLORIDE 100 ML/HR: 9 INJECTION, SOLUTION INTRAVENOUS at 17:21

## 2023-11-23 NOTE — PLAN OF CARE
Goal Outcome Evaluation:      Pt resting in bed with no complaints/needs. PIV patent, Nicardepine gtt infusing at 12.5, BP lower, but currently not at goal. A/ox4, pleasant, on RA. Denies pain, SOA, n/v.

## 2023-11-23 NOTE — PROGRESS NOTES
Name: Chato Potts ADMIT: 2023   : 1960  PCP: Provider, No Known    MRN: [id number] LOS: 1 days   AGE/SEX: 63 y.o. male  ROOM: [facility]     Subjective   Subjective   No acute events. Patient has no new complaints. His vision has improved. Taking PO. No HA/CP/dyspnea/f/c/n/v/d.  His brother is at bedside.    Objective   Objective   Vital Signs  Temp:  [97.3 °F (36.3 °C)-98.8 °F (37.1 °C)] 98.3 °F (36.8 °C)  Heart Rate:  [] 112  Resp:  [16-18] 18  BP: (118-200)/() 163/84  SpO2:  [93 %-99 %] 97 %  on   ;   Device (Oxygen Therapy): room air  Body mass index is 28.86 kg/m².  Physical Exam  Vitals and nursing note reviewed.   Constitutional:       General: He is not in acute distress.     Appearance: He is not toxic-appearing.   HENT:      Head: Normocephalic and atraumatic.      Nose: Nose normal.      Mouth/Throat:      Mouth: Mucous membranes are moist.      Pharynx: Oropharynx is clear.   Eyes:      Conjunctiva/sclera: Conjunctivae normal.      Pupils: Pupils are equal, round, and reactive to light.   Cardiovascular:      Rate and Rhythm: Normal rate and regular rhythm.      Pulses: Normal pulses.   Pulmonary:      Effort: Pulmonary effort is normal.      Breath sounds: Normal breath sounds. No rales.   Abdominal:      General: Bowel sounds are normal.      Palpations: Abdomen is soft.      Tenderness: There is no abdominal tenderness.   Musculoskeletal:         General: No swelling or tenderness.      Cervical back: Neck supple.   Skin:     General: Skin is warm and dry.      Capillary Refill: Capillary refill takes less than 2 seconds.   Neurological:      Mental Status: He is alert and oriented to person, place, and time.   Psychiatric:         Mood and Affect: Mood normal.         Behavior: Behavior normal.       Results Review     I reviewed the patient's new clinical results.  Results from last 7 days   Lab Units 23  1129   WBC 10*3/mm3 4.71   HEMOGLOBIN g/dL 14.8   PLATELETS  "10*3/mm3 175     Results from last 7 days   Lab Units 11/23/23  0425 11/22/23  1129   SODIUM mmol/L 141 139   POTASSIUM mmol/L 4.2 3.3*   CHLORIDE mmol/L 108* 105   CO2 mmol/L 20.9* 23.0   BUN mg/dL 13 14   CREATININE mg/dL 0.99 1.03   GLUCOSE mg/dL 156* 97   EGFR mL/min/1.73 86.1 82.1     Results from last 7 days   Lab Units 11/23/23  0425 11/22/23  1129   ALBUMIN g/dL 4.4 4.7   BILIRUBIN mg/dL  --  0.8   ALK PHOS U/L  --  78   AST (SGOT) U/L  --  19   ALT (SGPT) U/L  --  21     Results from last 7 days   Lab Units 11/23/23  0425 11/22/23  1129   CALCIUM mg/dL 9.0 8.9   ALBUMIN g/dL 4.4 4.7   MAGNESIUM mg/dL  --  2.2   PHOSPHORUS mg/dL 2.0*  --        No results found for: \"HGBA1C\", \"POCGLU\"    MRI Brain With & Without Contrast    Result Date: 11/22/2023   There is a large partially calcified dural-based mass with a broad base with respect to the left aspect of the falx cerebri medial to the left parietal and occipital lobes. The mass measures up to 7.6 x 5.8 x 8.2 cm in greatest dimensions and abuts the medial aspect of the superior portion of the left tentorial leaf with which it may have an attachment as well. The falx cerebri is bowed to the right and a small component along the right aspect of the falx cerebri medial to the right occipital lobe cannot be entirely excluded. There is marked mass effect upon the medial aspect of the left parietal and occipital lobes with marked sulcal effacement as well as ventricular effacement of the posterior aspect of the left lateral ventricle. There is shift of the midline structures to the right by approximately 8 mm. A small amount of vasogenic edema is seen within the left parietal and occipital lobes and this extends into the posterior aspect of the left temporal lobe. There is herniation of a small focus of the posterior and medial portion of the left temporal lobe through the tentorial incisura. Again, this lesion is most likely representative of a large meningioma.  " This report was finalized on 11/22/2023 4:41 PM by Dr. Trenton Feliciaon M.D on Workstation: [id number]      CT Head Without Contrast    Result Date: 11/22/2023  1. Large left parieto-occipital supratentorial hyperdense and partially calcified mass as discussed above. 2. This mass could conceivably represent a meningioma posterior arising from the tentorium or falx but further evaluation recommended with MRI of the brain with contrast. 3. Also correlation to any prior outside studies would be helpful. 4. Findings were discussed with the ER physician.  Radiation dose reduction techniques were utilized, including automated exposure control and exposure modulation based on body size.        I have personally reviewed all medications:  Scheduled Medications  amLODIPine, 5 mg, Oral, Q24H  cefTRIAXone, 1,000 mg, Intravenous, Q24H  dexAMETHasone, 4 mg, Intravenous, Q6H  famotidine, 20 mg, Intravenous, Q12H  iopamidol, 95 mL, Intravenous, Once in imaging  levETIRAcetam, 500 mg, Oral, BID  losartan, 25 mg, Oral, Q24H    Infusions  niCARdipine, 5-15 mg/hr, Last Rate: 10 mg/hr (11/23/23 1519)  sodium chloride, 100 mL/hr    Diet  Diet: Cardiac Diets; Healthy Heart (2-3 Na+); Texture: Regular Texture (IDDSI 7); Fluid Consistency: Thin (IDDSI 0)  NPO Diet NPO Type: Sips with Meds    I have personally reviewed:  [x]  Laboratory   [x]  Microbiology   [x]  Radiology   [x]  EKG/Telemetry  [x]  Cardiology/Vascular   []  Pathology    [x]  Records    Assessment/Plan     Active Hospital Problems    Diagnosis  POA    **Hypertensive emergency [I16.1]  Yes    Acute cystitis without hematuria [N30.00]  Yes    Brain mass [G93.89]  Yes    Vision disturbance [H53.9]  Yes    Hypokalemia [E87.6]  Yes    Brain compression [G93.5]  Yes    Brain edema [G93.6]  Yes      Resolved Hospital Problems   No resolved problems to display.   Hypertensive Emergency  - had papilledema on admission  - BP still elevated, start on cardene drip   - continue norvasc,  start on losartan  - echocardiogram noted with hyperdynamic LV, will give a timed course of gentle normal saline to hopefully prevent large fluctuations in blood pressure  - appreciate neurology recs    Brain Mass  - MRI brain noted with large left parietal-occipital mass  - has visual field deficits and RUE ataxia  - has surrounding edema, on decadron-famotidine for GI prophylaxis  - keppra started for seizure prophylaxis  - check CT c/a/p to look for evidence of primary lesion  - appreciate MALINDA recs, plans for craniotomy +/- prior embolization depending on whether the tumor is primary or secondary-intervention planned early next week    Acute Cystitis without Hematuria  - reported dysuria, UA is suggestive  - send urine culture  - cover with ceftriaxone    SCDs for DVT prophylaxis.  Full code.  Discussed with patient, family, and nursing staff.  Anticipate discharge  TBD  timing yet to be determined.      Jose Daniel Cardenas MD  [facility]  11/23/23  16:19 EST

## 2023-11-23 NOTE — SIGNIFICANT NOTE
11/23/23 0745   OTHER   Discipline occupational therapist   Rehab Time/Intention   Session Not Performed other (see comments)  (plans intracranial surgery to remove mass early next week. Pt has been up ad [place]. OT will sign off and follow-up after surgery)

## 2023-11-23 NOTE — SIGNIFICANT NOTE
11/23/23 7561   OTHER   Discipline speech language pathologist   Rehab Time/Intention   Session Not Performed other (see comments)  (Spoke with RN. Patient is tolerating regular/thin liquid diet. No swallow intervention warranted at this time. ST to s/o. Per chart, plans for intracranial surgery to remove mass early next week.  Please re-consult post surgery if swallow assessment is warranted. )

## 2023-11-24 ENCOUNTER — APPOINTMENT (OUTPATIENT)
Dept: CT IMAGING | Facility: HOSPITAL | Age: 63
DRG: 304 | End: 2023-11-24
Payer: COMMERCIAL

## 2023-11-24 LAB
ANION GAP SERPL CALCULATED.3IONS-SCNC: 11 MMOL/L (ref 5–15)
BASOPHILS # BLD AUTO: 0.01 10*3/MM3 (ref 0–0.2)
BASOPHILS NFR BLD AUTO: 0.1 % (ref 0–1.5)
BUN SERPL-MCNC: 17 MG/DL (ref 8–23)
BUN/CREAT SERPL: 22.7 (ref 7–25)
CALCIUM SPEC-SCNC: 8.6 MG/DL (ref 8.6–10.5)
CHLORIDE SERPL-SCNC: 109 MMOL/L (ref 98–107)
CO2 SERPL-SCNC: 20 MMOL/L (ref 22–29)
CREAT SERPL-MCNC: 0.75 MG/DL (ref 0.76–1.27)
DEPRECATED RDW RBC AUTO: 39.6 FL (ref 37–54)
EGFRCR SERPLBLD CKD-EPI 2021: 102 ML/MIN/1.73
EOSINOPHIL # BLD AUTO: 0 10*3/MM3 (ref 0–0.4)
EOSINOPHIL NFR BLD AUTO: 0 % (ref 0.3–6.2)
ERYTHROCYTE [DISTWIDTH] IN BLOOD BY AUTOMATED COUNT: 13 % (ref 12.3–15.4)
GLUCOSE SERPL-MCNC: 147 MG/DL (ref 65–99)
HCT VFR BLD AUTO: 42.6 % (ref 37.5–51)
HGB BLD-MCNC: 14.2 G/DL (ref 13–17.7)
IMM GRANULOCYTES # BLD AUTO: 0.07 10*3/MM3 (ref 0–0.05)
IMM GRANULOCYTES NFR BLD AUTO: 0.5 % (ref 0–0.5)
LYMPHOCYTES # BLD AUTO: 0.55 10*3/MM3 (ref 0.7–3.1)
LYMPHOCYTES NFR BLD AUTO: 4.3 % (ref 19.6–45.3)
MCH RBC QN AUTO: 28.3 PG (ref 26.6–33)
MCHC RBC AUTO-ENTMCNC: 33.3 G/DL (ref 31.5–35.7)
MCV RBC AUTO: 85 FL (ref 79–97)
MONOCYTES # BLD AUTO: 0.33 10*3/MM3 (ref 0.1–0.9)
MONOCYTES NFR BLD AUTO: 2.6 % (ref 5–12)
NEUTROPHILS NFR BLD AUTO: 11.78 10*3/MM3 (ref 1.7–7)
NEUTROPHILS NFR BLD AUTO: 92.5 % (ref 42.7–76)
NRBC BLD AUTO-RTO: 0 /100 WBC (ref 0–0.2)
PLATELET # BLD AUTO: 199 10*3/MM3 (ref 140–450)
PMV BLD AUTO: 10.5 FL (ref 6–12)
POTASSIUM SERPL-SCNC: 3.6 MMOL/L (ref 3.5–5.2)
RBC # BLD AUTO: 5.01 10*6/MM3 (ref 4.14–5.8)
SODIUM SERPL-SCNC: 140 MMOL/L (ref 136–145)
WBC NRBC COR # BLD AUTO: 12.74 10*3/MM3 (ref 3.4–10.8)

## 2023-11-24 PROCEDURE — 25510000001 IOPAMIDOL PER 1 ML: Performed by: HOSPITALIST

## 2023-11-24 PROCEDURE — 85025 COMPLETE CBC W/AUTO DIFF WBC: CPT | Performed by: INTERNAL MEDICINE

## 2023-11-24 PROCEDURE — 25010000002 CEFTRIAXONE PER 250 MG: Performed by: INTERNAL MEDICINE

## 2023-11-24 PROCEDURE — 80048 BASIC METABOLIC PNL TOTAL CA: CPT | Performed by: INTERNAL MEDICINE

## 2023-11-24 PROCEDURE — 99231 SBSQ HOSP IP/OBS SF/LOW 25: CPT | Performed by: NURSE PRACTITIONER

## 2023-11-24 PROCEDURE — 25810000003 SODIUM CHLORIDE 0.9 % SOLUTION: Performed by: INTERNAL MEDICINE

## 2023-11-24 PROCEDURE — 74177 CT ABD & PELVIS W/CONTRAST: CPT

## 2023-11-24 PROCEDURE — 25010000002 DEXAMETHASONE PER 1 MG: Performed by: NURSE PRACTITIONER

## 2023-11-24 PROCEDURE — 71260 CT THORAX DX C+: CPT

## 2023-11-24 RX ADMIN — IOPAMIDOL 85 ML: 755 INJECTION, SOLUTION INTRAVENOUS at 09:37

## 2023-11-24 RX ADMIN — NICARDIPINE HYDROCHLORIDE 5 MG/HR: 25 INJECTION, SOLUTION INTRAVENOUS at 02:47

## 2023-11-24 RX ADMIN — LEVETIRACETAM 500 MG: 500 TABLET, FILM COATED ORAL at 09:03

## 2023-11-24 RX ADMIN — FAMOTIDINE 20 MG: 10 INJECTION INTRAVENOUS at 21:02

## 2023-11-24 RX ADMIN — NICARDIPINE HYDROCHLORIDE 5 MG/HR: 25 INJECTION, SOLUTION INTRAVENOUS at 08:59

## 2023-11-24 RX ADMIN — LOSARTAN POTASSIUM 25 MG: 25 TABLET, FILM COATED ORAL at 09:03

## 2023-11-24 RX ADMIN — DEXAMETHASONE SODIUM PHOSPHATE 4 MG: 4 INJECTION, SOLUTION INTRA-ARTICULAR; INTRALESIONAL; INTRAMUSCULAR; INTRAVENOUS; SOFT TISSUE at 17:17

## 2023-11-24 RX ADMIN — NICARDIPINE HYDROCHLORIDE 5 MG/HR: 25 INJECTION, SOLUTION INTRAVENOUS at 13:40

## 2023-11-24 RX ADMIN — AMLODIPINE BESYLATE 5 MG: 5 TABLET ORAL at 09:03

## 2023-11-24 RX ADMIN — FAMOTIDINE 20 MG: 10 INJECTION INTRAVENOUS at 09:03

## 2023-11-24 RX ADMIN — CEFTRIAXONE SODIUM 1000 MG: 1 INJECTION, POWDER, FOR SOLUTION INTRAMUSCULAR; INTRAVENOUS at 09:03

## 2023-11-24 RX ADMIN — DEXAMETHASONE SODIUM PHOSPHATE 4 MG: 4 INJECTION, SOLUTION INTRA-ARTICULAR; INTRALESIONAL; INTRAMUSCULAR; INTRAVENOUS; SOFT TISSUE at 04:55

## 2023-11-24 RX ADMIN — SODIUM CHLORIDE 100 ML/HR: 9 INJECTION, SOLUTION INTRAVENOUS at 02:49

## 2023-11-24 RX ADMIN — DEXAMETHASONE SODIUM PHOSPHATE 4 MG: 4 INJECTION, SOLUTION INTRA-ARTICULAR; INTRALESIONAL; INTRAMUSCULAR; INTRAVENOUS; SOFT TISSUE at 12:51

## 2023-11-24 RX ADMIN — NICARDIPINE HYDROCHLORIDE 5 MG/HR: 25 INJECTION, SOLUTION INTRAVENOUS at 19:25

## 2023-11-24 RX ADMIN — LEVETIRACETAM 500 MG: 500 TABLET, FILM COATED ORAL at 21:02

## 2023-11-24 NOTE — CASE MANAGEMENT/SOCIAL WORK
Continued Stay Note  [facility]     Patient Name: Chato Potts  MRN: [id number]  Today's Date: 11/24/2023    Admit Date: 11/22/2023    Plan: Pt states returning home alone with brothers to assist. Pt having brain tumor extraction on Monday 11/27. Discussed Rehab vs HH. Needs follow up after surgery.   Discharge Plan       Row Name 11/24/23 1441       Plan    Plan Pt states returning home alone with brothers to assist. Pt having brain tumor extraction on Monday 11/27. Discussed Rehab vs HH. Needs follow up after surgery.    Patient/Family in Agreement with Plan yes    Plan Comments Met with pt. at bedside. Explained roll of . Face sheet and pharmacy verified. Pt lives in a house alone.  There are 4 steps with a rail to enter home. Pt states both of his brothers live in separate houses across the street with him. Denies any home DME.  Pt is independent with ADLs at baseline. Pt has never been to Rehab or used HH. Pt has large brain tumor. Plan is for Brain tumor extraction with precraniotomy embolization on Monday. Discussed with pt that he will most likely need Rehab or at least HH. Pt stated he did not think he would need it. Provided Road to Recovery, List of SNFs and HH, and information on Medicare Compare Website. Will need follow up after surgery. Pt enrolled with [facility]. Pt's PCP States his PCP is Dr Keen. Pt states he was driving but he has been told not to drive until everything is cleared up. Explained that CCP would follow to assess for discharge needs.  Gareth De León RN-BC                   Discharge Codes    No documentation.                       Gareth De León RN

## 2023-11-24 NOTE — CASE MANAGEMENT/SOCIAL WORK
Discharge Planning Assessment  [facility]     Patient Name: Chato Potts  MRN: [id number]  Today's Date: 11/24/2023    Admit Date: 11/22/2023    Plan: Pt states returning home alone with brothers to assist. Pt having brain tumor extraction on Monday 11/27. Discussed Rehab vs HH. Needs follow up after surgery.   Discharge Needs Assessment       Row Name 11/24/23 1430       Living Environment    People in Home alone    Unique Family Situation Pt states his two brothers lives in houses across the street from him.    Current Living Arrangements home    Potentially Unsafe Housing Conditions none    Primary Care Provided by self    Provides Primary Care For no one    Family Caregiver if Needed sibling(s)    Family Caregiver Names Brothers    Quality of Family Relationships involved    Able to Return to Prior Arrangements yes       Resource/Environmental Concerns    Resource/Environmental Concerns home accessibility    Home Accessibility Concerns stairs to enter home    Transportation Concerns none       Transition Planning    Patient/Family Anticipates Transition to home with family;inpatient rehabilitation facility    Patient/Family Anticipated Services at Transition home health care;rehabilitation services;skilled nursing;durable medical equipment    Transportation Anticipated family or friend will provide       Discharge Needs Assessment    Readmission Within the Last 30 Days no previous admission in last 30 days    Equipment Currently Used at Home none    Concerns to be Addressed care coordination/care conferences;discharge planning    Anticipated Changes Related to Illness none    Equipment Needed After Discharge none    Provided Post Acute Provider List? Yes    Post Acute Provider List Nursing Home;Inpatient Rehab;Home Health    Provided Post Acute Provider Quality & Resource List? Yes    Post Acute Provider Quality and Resource List Home Health;Inpatient Rehab;Nursing Home    Delivered To Patient    Method of  Delivery In person    Patient's Choice of Community Agency(s) Pt states he is going home at D/C    Current Discharge Risk chronically ill                   Discharge Plan       Row Name 11/24/23 1441       Plan    Plan Pt states returning home alone with brothers to assist. Pt having brain tumor extraction on Monday 11/27. Discussed Rehab vs HH. Needs follow up after surgery.    Patient/Family in Agreement with Plan yes                  Continued Care and Services - Admitted Since 11/22/2023    Coordination has not been started for this encounter.          Demographic Summary       Row Name 11/24/23 1429       General Information    Admission Type inpatient    Arrived From emergency department    Reason for Consult care coordination/care conference;discharge planning    Preferred Language English                   Functional Status       Row Name 11/24/23 1430       Functional Status    Usual Activity Tolerance good    Current Activity Tolerance moderate       Functional Status, IADL    Medications independent    Meal Preparation independent    Housekeeping independent    Laundry independent    Shopping independent       Mental Status    General Appearance WDL WDL       Mental Status Summary    Recent Changes in Mental Status/Cognitive Functioning no changes       Employment/    Employment Status retired                 Gareth De León RN

## 2023-11-24 NOTE — PROGRESS NOTES
[facility] NEUROSURGERY INTRACRANIAL PROGRESS NOTE    PATIENT IDENTIFICATION:   Name:  Chato Potts      MRN:  [id number]     63 y.o.  male               CC: brain mass      Subjective     Interval History:  had CT C/A/P. Reports no blurred vision since admission. No PALACIO    ROS:  HEENT: No headache, no vision changes  Neuro: No numbness, tingling, or weakness, no speech difficulties, no balance difficulties    Objective     Vital signs in last 24 hours:  Temp:  [98.2 °F (36.8 °C)-98.4 °F (36.9 °C)] 98.4 °F (36.9 °C)  Heart Rate:  [] 94  Resp:  [18] 18  BP: (140-200)/() 154/99      Intake/Output this shift:  I/O this shift:  In: -   Out: 550 [Urine:550]      Intake/Output last 3 shifts:  I/O last 3 completed shifts:  In: 240 [P.O.:240]  Out: 2025 [Urine:2025]    LABS:  Results from last 7 days   Lab Units 11/24/23  0535 11/22/23  1129   WBC 10*3/mm3 12.74* 4.71   HEMOGLOBIN g/dL 14.2 14.8   HEMATOCRIT % 42.6 43.7   PLATELETS 10*3/mm3 199 175     Results from last 7 days   Lab Units 11/24/23  0535 11/23/23  0425 11/22/23  1129   SODIUM mmol/L 140 141 139   POTASSIUM mmol/L 3.6 4.2 3.3*   CHLORIDE mmol/L 109* 108* 105   CO2 mmol/L 20.0* 20.9* 23.0   BUN mg/dL 17 13 14   CREATININE mg/dL 0.75* 0.99 1.03   CALCIUM mg/dL 8.6 9.0 8.9   BILIRUBIN mg/dL  --   --  0.8   ALK PHOS U/L  --   --  78   ALT (SGPT) U/L  --   --  21   AST (SGOT) U/L  --   --  19   GLUCOSE mg/dL 147* 156* 97     Urine Culture 11/22-growth present, too young to evaluate     IMAGING STUDIES:  CT CHEST W CONTRAST DIAGNOSTIC-, CT ABDOMEN PELVIS W CONTRAST-     Radiation dose reduction techniques were utilized, including automated  exposure control and exposure modulation based on body size.     Clinical: Brain mass, evaluate for primary source     FINDINGS:  1. Cardiac size within normal limits. Subtle diffuse wall thickening of  the distal one third of the esophagus perhaps related to esophagitis.  Caliber of the esophagus is normal. No  mediastinal or hilar  mass/lymphadenopathy. Diameter of the ascending thoracic aorta upper  limits of normal at 3.8 cm. There is biapical pleural thickening scar  formation which is symmetric and benign-appearing. No pleural effusion,  active airspace disease, vascular congestion or suspicious pulmonary  lesion.     2. The liver, gallbladder, pancreas, spleen, adrenal glands and kidneys  are normal. No calculus or obstructive uropathy, no biliary duct  dilatation. Diameter of the aorta is within normal limits. The urinary  bladder is typical in appearance. Prostate and seminal vesicles  unremarkable        3. The stomach, appendix, colon and small bowel have a satisfactory  appearance. No induration of the mesentery to suggest an inflammatory  process. No wall thickening seen to suggest underlying neoplasm. No free  air or free intraperitoneal fluid. No lytic or sclerotic bone lesion  seen.     CONCLUSION: There is no indication of primary lesion involving the  thorax, abdomen or pelvis. No indication of metastasis. No adenopathy  seen.    I personally viewed and interpreted the patient's chart.    Meds reviewed/changed: Yes    Current Facility-Administered Medications:     acetaminophen (TYLENOL) tablet 650 mg, 650 mg, Oral, Q4H PRN, Jeremias Anglin MD    amLODIPine (NORVASC) tablet 5 mg, 5 mg, Oral, Q24H, Jeremias Anglin MD, 5 mg at 11/24/23 0903    cefTRIAXone (ROCEPHIN) 1,000 mg in sodium chloride 0.9 % 100 mL IVPB-VTB, 1,000 mg, Intravenous, Q24H, Jose Daniel Cardenas MD, Last Rate: 200 mL/hr at 11/24/23 0903, 1,000 mg at 11/24/23 0903    dexAMETHasone (DECADRON) injection 4 mg, 4 mg, Intravenous, Q6H, Jill Kim APRN, 4 mg at 11/24/23 0455    famotidine (PEPCID) injection 20 mg, 20 mg, Intravenous, Q12H, Jill Kim APRN, 20 mg at 11/24/23 0903    influenza vac split quad (FLUZONE,FLUARIX,AFLURIA,FLULAVAL) injection 0.5 mL, 0.5 mL, Intramuscular, During Hospitalization, Jeremias Anglin MD     labetalol (NORMODYNE,TRANDATE) injection 10 mg, 10 mg, Intravenous, Q2H PRN, Jeremias Anglin MD    levETIRAcetam (KEPPRA) tablet 500 mg, 500 mg, Oral, BID, Jill Kim, APRN, 500 mg at 11/24/23 0903    losartan (COZAAR) tablet 25 mg, 25 mg, Oral, Q24H, Jose Daniel Cardenas MD, 25 mg at 11/24/23 0903    melatonin tablet 3 mg, 3 mg, Oral, Nightly PRN, Jeremias Anglin MD    niCARdipine (CARDENE) 25 mg in 250 mL NS infusion kit, 5-15 mg/hr, Intravenous, Titrated, Jose Daniel Cardenas MD, Last Rate: 50 mL/hr at 11/24/23 0859, 5 mg/hr at 11/24/23 0859    nitroglycerin (NITROSTAT) SL tablet 0.4 mg, 0.4 mg, Sublingual, Q5 Min PRN, Jeremias Anglin MD    ondansetron (ZOFRAN) tablet 4 mg, 4 mg, Oral, Q6H PRN **OR** ondansetron (ZOFRAN) injection 4 mg, 4 mg, Intravenous, Q6H PRN, Jeremias Anglin MD      Physical Exam:    General:   Awake, alert, oriented x3. Speech clear with no aphasia  CN II-XII:   Right homonymous hemianopsia otherwise cranial nerves intact including partial right 3rd nerve palsy that was present the other day has resolved  Motor:    Moving all extremities.  No drift  Station and Gait:  Up ad [place].  Coordination:  Finger-to-nose test shows no dysmetria.      Assessment & Plan     ASSESSMENT:      Hypertensive emergency    Brain mass    Vision disturbance    Hypokalemia    Brain compression    Brain edema    Acute cystitis without hematuria    Patient with large left occipital brain mass and right homonymous hemianopsia.  Ataxia right upper extremity improved.  He reports no episodes of blurred vision.  CT chest abdomen pelvis unremarkable for malignancy.  Will likely need tumor extraction with precraniotomy embolization.  Definitive surgical plans still pending but will need to be n.p.o. midnight for possible surgery Monday.  Currently being treated for acute cystitis, on Rocephin.  Kindly request medical clearance from primary service for surgery or whether we need to hold off due to UTI    PLAN:   Continue  "steroid and Keppra  We will check in on Sunday to ensure patient is medically clear for surgery, currently being treated for UTI.  N.p.o. midnight Monday 11/27-possible craniotomy +/- precrani embolization    I discussed the patient's findings and my recommendations with patient and family    During patient visit, I utilized appropriate personal protective equipment including gloves.  Appropriate PPE was worn during the entire visit.  Hand hygiene was completed before and after.      LOS: 2 days       Jill Kim, APRN  11/24/2023  11:14 EST    \"Dictated utilizing Dragon dictation\".      "

## 2023-11-24 NOTE — PLAN OF CARE
Pt. is A&O X 4. No complain of pain. Blurred vision presented, but get better than before per Pt. states. Called IV nurse for setting up new IV. NS and Nicardipine IV continuous given. See MAR. Nicardipine rate changed from 12.5 mg/hr to 5 mg/hr. Urinal at bedside. Call light within reach.    Goal Outcome Evaluation:  Plan of Care Reviewed With: patient  Progress: improving

## 2023-11-24 NOTE — PROGRESS NOTES
Name: Chato Ptots ADMIT: 2023   : 1960  PCP: Provider, No Known    MRN: [id number] LOS: 2 days   AGE/SEX: 63 y.o. male  ROOM: [facility]     Subjective   Subjective   Patient seen and examined this morning. Hospital day 2. At time of my examination, patient is awake, alert and resting in bed. Visual disturbances noted previously have improved. At present, he denies chest pain, palpitations, dyspnea, dizziness, lightheadedness or other acute complaints. Discussed with nursing, he remains on Cardene drip at this time, have been unable to wean due to blood pressure readings.     Objective   Objective   Vital Signs  Temp:  [98.2 °F (36.8 °C)-98.4 °F (36.9 °C)] 98.4 °F (36.9 °C)  Heart Rate:  [] 94  Resp:  [18] 18  BP: (140-200)/() 154/99  SpO2:  [93 %-97 %] 96 %  on   ;   Device (Oxygen Therapy): room air  Body mass index is 28.86 kg/m².  Physical Exam  Vitals and nursing note reviewed.   Constitutional:       General: He is awake. He is not in acute distress.     Appearance: He is overweight.   HENT:      Head: Atraumatic.   Eyes:      General: No scleral icterus.     Conjunctiva/sclera: Conjunctivae normal.   Cardiovascular:      Rate and Rhythm: Normal rate and regular rhythm.      Pulses: Normal pulses.      Heart sounds: Normal heart sounds.   Pulmonary:      Effort: Pulmonary effort is normal. No respiratory distress.      Breath sounds: Normal breath sounds.   Abdominal:      General: Bowel sounds are normal.      Palpations: Abdomen is soft.      Tenderness: There is no abdominal tenderness.   Skin:     General: Skin is warm and dry.   Neurological:      General: No focal deficit present.      Mental Status: He is alert.   Psychiatric:         Behavior: Behavior is cooperative.       Results Review     I reviewed the patient's new clinical results.  Results from last 7 days   Lab Units 23  0535 23  1129   WBC 10*3/mm3 12.74* 4.71   HEMOGLOBIN g/dL 14.2 14.8   PLATELETS  "10*3/mm3 199 175     Results from last 7 days   Lab Units 11/24/23  0535 11/23/23  0425 11/22/23  1129   SODIUM mmol/L 140 141 139   POTASSIUM mmol/L 3.6 4.2 3.3*   CHLORIDE mmol/L 109* 108* 105   CO2 mmol/L 20.0* 20.9* 23.0   BUN mg/dL 17 13 14   CREATININE mg/dL 0.75* 0.99 1.03   GLUCOSE mg/dL 147* 156* 97   EGFR mL/min/1.73 102.0 86.1 82.1     Results from last 7 days   Lab Units 11/23/23  0425 11/22/23  1129   ALBUMIN g/dL 4.4 4.7   BILIRUBIN mg/dL  --  0.8   ALK PHOS U/L  --  78   AST (SGOT) U/L  --  19   ALT (SGPT) U/L  --  21     Results from last 7 days   Lab Units 11/24/23  0535 11/23/23  0425 11/22/23  1129   CALCIUM mg/dL 8.6 9.0 8.9   ALBUMIN g/dL  --  4.4 4.7   MAGNESIUM mg/dL  --   --  2.2   PHOSPHORUS mg/dL  --  2.0*  --        No results found for: \"HGBA1C\", \"POCGLU\"    MRI Brain With & Without Contrast    Result Date: 11/22/2023   There is a large partially calcified dural-based mass with a broad base with respect to the left aspect of the falx cerebri medial to the left parietal and occipital lobes. The mass measures up to 7.6 x 5.8 x 8.2 cm in greatest dimensions and abuts the medial aspect of the superior portion of the left tentorial leaf with which it may have an attachment as well. The falx cerebri is bowed to the right and a small component along the right aspect of the falx cerebri medial to the right occipital lobe cannot be entirely excluded. There is marked mass effect upon the medial aspect of the left parietal and occipital lobes with marked sulcal effacement as well as ventricular effacement of the posterior aspect of the left lateral ventricle. There is shift of the midline structures to the right by approximately 8 mm. A small amount of vasogenic edema is seen within the left parietal and occipital lobes and this extends into the posterior aspect of the left temporal lobe. There is herniation of a small focus of the posterior and medial portion of the left temporal lobe through the " tentorial incisura. Again, this lesion is most likely representative of a large meningioma.  This report was finalized on 11/22/2023 4:41 PM by Dr. Trenton Feliciano M.D on Workstation: [id number]      CT Head Without Contrast    Result Date: 11/22/2023  1. Large left parieto-occipital supratentorial hyperdense and partially calcified mass as discussed above. 2. This mass could conceivably represent a meningioma posterior arising from the tentorium or falx but further evaluation recommended with MRI of the brain with contrast. 3. Also correlation to any prior outside studies would be helpful. 4. Findings were discussed with the ER physician.  Radiation dose reduction techniques were utilized, including automated exposure control and exposure modulation based on body size.        I have personally reviewed all medications:  Scheduled Medications  amLODIPine, 5 mg, Oral, Q24H  cefTRIAXone, 1,000 mg, Intravenous, Q24H  dexAMETHasone, 4 mg, Intravenous, Q6H  famotidine, 20 mg, Intravenous, Q12H  levETIRAcetam, 500 mg, Oral, BID  losartan, 25 mg, Oral, Q24H    Infusions  niCARdipine, 5-15 mg/hr, Last Rate: 5 mg/hr (11/24/23 0859)    Diet  Diet: Cardiac Diets; Healthy Heart (2-3 Na+); Texture: Regular Texture (IDDSI 7); Fluid Consistency: Thin (IDDSI 0)  NPO Diet NPO Type: Sips with Meds    I have personally reviewed:  [x]  Laboratory   [x]  Microbiology   [x]  Radiology   [x]  EKG/Telemetry  [x]  Cardiology/Vascular   []  Pathology    []  Records       Assessment/Plan     Active Hospital Problems    Diagnosis  POA    **Hypertensive emergency [I16.1]  Yes    Acute cystitis without hematuria [N30.00]  Yes    Brain mass [G93.89]  Yes    Vision disturbance [H53.9]  Yes    Hypokalemia [E87.6]  Yes    Brain compression [G93.5]  Yes    Brain edema [G93.6]  Yes      Resolved Hospital Problems   No resolved problems to display.       63 y.o. male admitted with Hypertensive emergency.    Hypertensive Emergency  - Patient noted to have  "SBP >200 and DBP >120 on arrival, coupled with papilledema. Findings consistent with hypertensive emergency. 2D Echo (11/23/23) showing Left ventricular systolic function is hyperdynamic (EF > 70%), LVOT pressure gradient 17 at rest and 32 with provocation, likely a result of hyperdynamic left ventricle. Also noting mild to moderate aortic regurgitation.  - He remains on Cardene drip at this time, in addition to Norvasc and Losartan. Most recent BP reading have SBP <160, discussed with nursing, going to attempt to wean drip at this time and see if BP will remain stable.  - Appreciate neurology recs.     Brain Mass  - MRI brain obtained and noted \"large partially calcified dural-based mass with a broad base with respect to the left aspect of the falx cerebri medial to the left   parietal and occipital lobes\" with mass effect upon the medial aspect of the left parietal and occipital lobes with marked sulcal effacement and ventricular effacement of the posterior aspect of the lateral left ventricle, covered with 8 mm of midline shift to the right and small amount of vasogenic edema.  - Imaging findings coupled with patient's endorsement of visual field deficits and RUE ataxia.  - Neurosurgery consulted and following. He is on Decadron at present,  appreciate MALINDA recs, plans for craniotomy +/- prior embolization depending on whether the tumor is primary or secondary-intervention planned early next week.  - CT C/A/P obtained, however, no evidence of findings concerning for malignant process.  - Keppra for seizure prophylaxis, Famotidine for GI prophylaxis.  - Closely monitor, fall/aspiration precautions, PT/OT, follow up Neurosurgery plans and recommendations.    Acute cystitis without hematuria  - Patient presenting with symptoms of dysuria coupled with UA findings showing 2+ leukocyte esterase, TNTC WBC, 2+ bacteria.  He is currently on ceftriaxone, urine culture pending.  - Continue empiric ceftriaxone as prescribed " for now while awaiting results of further workup.  - Acute urinary retention protocol.    Leukocytosis  - WBC count elevated on most recent labs. Etiology of WBC elevation likely a result of infection + corticosteroids.   - Order repeat CBC in AM for reassessment.      Hyperglycemia  - Glucose elevated on most recent labs. Patient without known history of T2DM.  Etiology likely a result of corticosteroids, no prior A1c available for review.  - Order repeat A1c at this time.  - Monitor for now, continue with POC glucose checks, can add correctional SSI if needed.    Hypokalemia, resolved  - Potassium noted to be low previously, repleted per electrolyte protocol and improved on most recent testing.      SCDs for DVT prophylaxis.  Full code.  Discussed with patient, family, and nursing staff.  Anticipate discharge  TBD  timing yet to be determined.      Mikel Leonard DO  [facility]  11/24/23

## 2023-11-24 NOTE — PAYOR COMM NOTE
"Ector Potts (63 y.o. Male)                          ATTENTION; INITIAL CLINICALS PENDING CASE REF # [id number]                        REPLY TO [facility] DEPT  [phone] OR CALL   BAMBI RIVER LPN           Date of Birth   1960    Social Security Number       Address   [address] [place] [id number]    Home Phone   [phone]    MRN   [id number]       [employer]    Marital Status   Single                            Admission Date   11/22/23    Admission Type   Emergency    Admitting Provider   Jeremias Anglin MD    Attending Provider   Mikel Leonard DO    Department, Room/Bed   [address], S509/1       Discharge Date       Discharge Disposition       Discharge Destination                                 Attending Provider: Mikel Leonard DO    Allergies: No Known Allergies    Isolation: None   Infection: None   Code Status: CPR    Ht: 188 cm (74.02\")   Wt: 102 kg (224 lb 13.9 oz)    Admission Cmt: None   Principal Problem: Hypertensive emergency [I16.1]                   Active Insurance as of 11/22/2023       Primary Coverage       Payor Plan Insurance Group Employer/Plan Group    ANTH [employer] ANTH PATHWAY TRANSITION HMO NON PAR [id number]       Payor Plan Address Payor Plan Phone Number Payor Plan Fax Number Effective Dates    [phone]   8/1/2023 - None Entered    [place] [id number]         Subscriber Name Subscriber Birth Date Member ID       ECTOR POTTS 1960 [id number]                     Emergency Contacts        (Rel.) Home Phone Work Phone Mobile Phone    ABIRAYNE (Brother) -- -- [phone]    ABIALEXANDRA (Brother) -- -- [phone]                 History & Physical        Jeremias Anglin MD at 11/22/23 1425              Patient Name:  Ector Potts  YOB: 1960  MRN:  [id number]  Admit Date:  11/22/2023  Patient Care Team:  Provider, No Known as PCP - General      Subjective  History Present Illness " "    Chief Complaint   Patient presents with    Blurred Vision       Mr. Potts is a 63 y.o. male with a history of told he has high blood pressure that presents to  complaining of altered vision.  He initially was seen at an ophthalmologist who states that they saw something concerning and is asked to send him to the emergency department to be evaluated.  He has had altered vision for about the last 4 weeks.  He states that it comes and goes.  No particular aggravating or relieving factors.  No other neurological deficits.  No complaints of headache, nausea or vomiting.  No weight loss.  He has previously been diagnosed with hypertension but noncompliant with his medications because he \"feels fine.\"      Review of Systems   Constitutional: Negative.    HENT: Negative.     Eyes:  Positive for visual disturbance.   Respiratory: Negative.     Gastrointestinal: Negative.    Endocrine: Negative.    Genitourinary: Negative.    Musculoskeletal: Negative.    Skin: Negative.    Neurological: Negative.    Hematological: Negative.    Psychiatric/Behavioral: Negative.          Personal History     History reviewed. No pertinent past medical history.  History reviewed. No pertinent surgical history.  History reviewed. No pertinent family history.  Social History     Tobacco Use    Smoking status: Never    Smokeless tobacco: Never   Substance Use Topics    Alcohol use: Never    Drug use: Never     No current facility-administered medications on file prior to encounter.     No current outpatient medications on file prior to encounter.     No Known Allergies    Objective   Objective     Vital Signs  Temp:  [97.9 °F (36.6 °C)] 97.9 °F (36.6 °C)  Heart Rate:  [] 77  Resp:  [16-19] 16  BP: (173-205)/(112-128) 173/112  SpO2:  [95 %-97 %] 95 %  on   ;   Device (Oxygen Therapy): room air  Body mass index is 28.89 kg/m².    Physical Exam  Vitals and nursing note reviewed.   Constitutional:       Appearance: He " is well-developed.   HENT:      Head: Normocephalic and atraumatic.      Nose:      Comments: Skin growth on bridge of nose  Eyes:      General: No scleral icterus.  Cardiovascular:      Rate and Rhythm: Normal rate and regular rhythm.   Pulmonary:      Effort: Pulmonary effort is normal.      Breath sounds: Normal breath sounds.   Abdominal:      General: Bowel sounds are normal. There is no distension.      Palpations: Abdomen is soft.      Tenderness: There is no abdominal tenderness.   Skin:     General: Skin is warm and dry.   Neurological:      Mental Status: He is alert. Mental status is at baseline.         Results Review:  I reviewed the patient's new clinical results.  I reviewed the patient's new imaging results and agree with the interpretation.  I reviewed the patient's other test results and agree with the interpretation  I personally viewed and interpreted the patient's EKG/Telemetry data  Discussed with ED provider.    Lab Results (last 24 hours)       Procedure Component Value Units Date/Time    CBC & Differential [[id number]]  (Normal) Collected: 11/22/23 1129    Specimen: Blood Updated: 11/22/23 1145    Narrative:      The following orders were created for panel order CBC & Differential.  Procedure                               Abnormality         Status                     ---------                               -----------         ------                     CBC Auto Differential[[id number]]        Normal              Final result                 Please view results for these tests on the individual orders.    Comprehensive Metabolic Panel [[id number]]  (Abnormal) Collected: 11/22/23 1129    Specimen: Blood Updated: 11/22/23 1203     Glucose 97 mg/dL      BUN 14 mg/dL      Creatinine 1.03 mg/dL      Sodium 139 mmol/L      Potassium 3.3 mmol/L      Chloride 105 mmol/L      CO2 23.0 mmol/L      Calcium 8.9 mg/dL      Total Protein 6.4 g/dL      Albumin 4.7 g/dL      ALT (SGPT) 21 U/L      AST (SGOT)  19 U/L      Alkaline Phosphatase 78 U/L      Total Bilirubin 0.8 mg/dL      Globulin 1.7 gm/dL      A/G Ratio 2.8 g/dL      BUN/Creatinine Ratio 13.6     Anion Gap 11.0 mmol/L      eGFR 82.1 mL/min/1.73     Narrative:      GFR Normal >60  Chronic Kidney Disease <60  Kidney Failure <15      Protime-INR [[id number]]  (Normal) Collected: 11/22/23 1129    Specimen: Blood Updated: 11/22/23 1200     Protime 12.7 Seconds      INR 0.94    aPTT [[id number]]  (Normal) Collected: 11/22/23 1129    Specimen: Blood Updated: 11/22/23 1200     PTT 29.8 seconds     Single High Sensitivity Troponin T [[id number]]  (Normal) Collected: 11/22/23 1129    Specimen: Blood Updated: 11/22/23 1210     HS Troponin T 8 ng/L     Narrative:      High Sensitive Troponin T Reference Range:  <14.0 ng/L- Negative Female for AMI  <22.0 ng/L- Negative Male for AMI  >=14 - Abnormal Female indicating possible myocardial injury.  >=22 - Abnormal Male indicating possible myocardial injury.   Clinicians would have to utilize clinical acumen, EKG, Troponin, and serial changes to determine if it is an Acute Myocardial Infarction or myocardial injury due to an underlying chronic condition.         Magnesium [[id number]]  (Normal) Collected: 11/22/23 1129    Specimen: Blood Updated: 11/22/23 1203     Magnesium 2.2 mg/dL     TSH [[id number]]  (Normal) Collected: 11/22/23 1129    Specimen: Blood Updated: 11/22/23 1210     TSH 3.960 uIU/mL     T4, Free [[id number]]  (Normal) Collected: 11/22/23 1129    Specimen: Blood Updated: 11/22/23 1210     Free T4 0.94 ng/dL     Narrative:      Results may be falsely increased if patient taking Biotin.      CBC Auto Differential [[id number]]  (Normal) Collected: 11/22/23 1129    Specimen: Blood Updated: 11/22/23 1145     WBC 4.71 10*3/mm3      RBC 5.05 10*6/mm3      Hemoglobin 14.8 g/dL      Hematocrit 43.7 %      MCV 86.5 fL      MCH 29.3 pg      MCHC 33.9 g/dL      RDW 13.3 %      RDW-SD 41.8 fl      MPV 10.2 fL      Platelets 175  10*3/mm3      Neutrophil % 64.9 %      Lymphocyte % 20.4 %      Monocyte % 9.6 %      Eosinophil % 3.4 %      Basophil % 1.5 %      Immature Grans % 0.2 %      Neutrophils, Absolute 3.06 10*3/mm3      Lymphocytes, Absolute 0.96 10*3/mm3      Monocytes, Absolute 0.45 10*3/mm3      Eosinophils, Absolute 0.16 10*3/mm3      Basophils, Absolute 0.07 10*3/mm3      Immature Grans, Absolute 0.01 10*3/mm3      nRBC 0.0 /100 WBC     Urinalysis With Microscopic If Indicated (No Culture) - Urine, Clean Catch [[id number]]  (Abnormal) Collected: 11/22/23 1318    Specimen: Urine, Clean Catch Updated: 11/22/23 1352     Color, UA Yellow     Appearance, UA Clear     pH, UA 7.0     Specific Gravity, UA 1.008     Glucose, UA Negative     Ketones, UA Negative     Bilirubin, UA Negative     Blood, UA Trace     Protein, UA Negative     Leuk Esterase, UA Moderate (2+)     Nitrite, UA Negative     Urobilinogen, UA 0.2 E.U./dL    Urinalysis, Microscopic Only - Urine, Clean Catch [[id number]]  (Abnormal) Collected: 11/22/23 1318    Specimen: Urine, Clean Catch Updated: 11/22/23 1418     RBC, UA 0-2 /HPF      WBC, UA Too Numerous to Count /HPF      Bacteria, UA 2+ /HPF      Squamous Epithelial Cells, UA None Seen /HPF      Hyaline Casts, UA None Seen /LPF      Methodology Manual Light Microscopy            Imaging Results (Last 24 Hours)       Procedure Component Value Units Date/Time    MRI Brain With & Without Contrast [[id number]] Resulted: 11/22/23 1547     Updated: 11/22/23 1547    CT Head Without Contrast [[id number]] Collected: 11/22/23 1421     Updated: 11/22/23 1421    Narrative:      CT OF THE BRAIN WITHOUT CONTRAST 11/22/2023     HISTORY: Blurred vision. Headache.     Axial images were obtained through the brain without intravenous  contrast.     There is a large hyperdense and partially calcified left supratentorial  mass which abuts the posterior falx and leftward aspect of the  tentorium. The mass is seen in the posterior  parieto-occipital region.  It is difficult to determine if it is extra-axial or intra-axial. The  mass measures up to 7.6 cm in craniocaudal dimension x 7.4 cm in AP  dimension x 5.9 cm in transverse dimension. Small amount of surrounding  edema is seen.     No other masses are seen. There is approximately 8 mm midline shift to  the right. No intracranial hemorrhage is seen.     Ventricles do not appear significantly dilated. No bony lesions are  seen.       Impression:      1. Large left parieto-occipital supratentorial hyperdense and partially  calcified mass as discussed above.  2. This mass could conceivably represent a meningioma posterior arising  from the tentorium or falx but further evaluation recommended with MRI  of the brain with contrast.  3. Also correlation to any prior outside studies would be helpful.  4. Findings were discussed with the ER physician.     Radiation dose reduction techniques were utilized, including automated  exposure control and exposure modulation based on body size.                    ECG 12 Lead Stroke Evaluation   Final Result   HEART RATE= 80  bpm   RR Interval= 750  ms   GA Interval= 180  ms   P Horizontal Axis= -67  deg   P Front Axis= 18  deg   QRSD Interval= 151  ms   QT Interval= 424  ms   QTcB= 490  ms   QRS Axis= -17  deg   T Wave Axis= 1  deg   - ABNORMAL ECG -   Sinus rhythm   Probable left atrial enlargement   Right bundle branch block   No Prior Tracing for Comparison   Electronically Signed By: Madelin Quinones ([facility]) 22-Nov-2023 14:59:09   Date and Time of Study: 2023-11-22 11:57:29      SCANNED - TELEMETRY     Final Result        Assessment/Plan   Assessment & Plan  Active Hospital Problems    Diagnosis  POA    **Hypertensive emergency [I16.1]  Yes    Brain mass [G93.89]  Yes    Vision disturbance [H53.9]  Yes    Hypokalemia [E87.6]  Yes      Resolved Hospital Problems   No resolved problems to display.       63 y.o. male admitted with Hypertensive  emergency.    Patient is severely elevated blood pressure readings with findings of papilledema on exam per neurology.  Will need to initiate antihypertensive medication.  He did receive IV labetalol in the emergency department which will be continued as needed.  Incidental finding of large mass on his brain on head CT.  Neurosurgery will evaluate.  MRI brain with contrast ordered.  Defer to neurosurgery whether indication for steroids or seizure medication.      Replace potassium.      SCDs for DVT prophylaxis.  Full code.  Discussed with patient, family, and ED provider.      Jeremias Anglin MD  [facility]  11/22/23  16:00 EST    ADDENDUM  MRI has been performed demonstrating enhancing left parietal occipital mass with midline shift and mass effect.  Neurosurgery started him on IV steroids and Keppra and states that he will need intracranial surgery.  He has no known cardiac history or complaints of chest pain.  However given his probable longstanding untreated hypertension will get EKG and echocardiogram.  If any significant abnormalities would favor cardiology consultation for formal preoperative clearance.    Electronically signed by Jeremias Anglin MD, 11/22/23, 6:35 PM EST.        Electronically signed by Jeremias Anglin MD at 11/22/23 1837          Emergency Department Notes        Esthela New RN at 11/22/23 1512          Attempted to call report, RN unavailable. Asked to call back with any questions     Electronically signed by Esthela New RN at 11/22/23 1512       Esthela New RN at 11/22/23 1429          Nursing report ED to floor  Chato Potts  63 y.o.  male    HPI :   Chief Complaint   Patient presents with    Blurred Vision       Admitting doctor:   Jeremias Anglin MD    Admitting diagnosis:   The primary encounter diagnosis was Severe hypertension. Diagnoses of Intracranial mass and Visual disturbance were also pertinent to this visit.    Code status:   Current Code  "Status       Date Active Code Status Order ID Comments User Context       Not on file            Allergies:   Patient has no known allergies.    Isolation:   No active isolations    Intake and Output  No intake or output data in the 24 hours ending 11/22/23 1429    Weight:       11/22/23  1033   Weight: 102 kg (225 lb)       Most recent vitals:   Vitals:    11/22/23 1033 11/22/23 1044 11/22/23 1148 11/22/23 1317   BP:  (!) 205/128 (!) 196/123 (!) 173/112   Patient Position:  Lying     Pulse: 109  97 77   Resp: 19   16   Temp: 97.9 °F (36.6 °C)      TempSrc: Tympanic      SpO2: 97%   95%   Weight: 102 kg (225 lb)      Height: 188 cm (74\")          Active LDAs/IV Access:   Lines, Drains & Airways       Active LDAs       Name Placement date Placement time Site Days    Peripheral IV 11/22/23 1145 Anterior;Distal;Right;Upper Arm 11/22/23  1145  Arm  less than 1                    Labs (abnormal labs have a star):   Labs Reviewed   COMPREHENSIVE METABOLIC PANEL - Abnormal; Notable for the following components:       Result Value    Potassium 3.3 (*)     All other components within normal limits    Narrative:     GFR Normal >60  Chronic Kidney Disease <60  Kidney Failure <15     URINALYSIS W/ MICROSCOPIC IF INDICATED (NO CULTURE) - Abnormal; Notable for the following components:    Blood, UA Trace (*)     Leuk Esterase, UA Moderate (2+) (*)     All other components within normal limits   URINALYSIS, MICROSCOPIC ONLY - Abnormal; Notable for the following components:    WBC, UA Too Numerous to Count (*)     Bacteria, UA 2+ (*)     All other components within normal limits   PROTIME-INR - Normal   APTT - Normal   SINGLE HSTROPONIN T - Normal    Narrative:     High Sensitive Troponin T Reference Range:  <14.0 ng/L- Negative Female for AMI  <22.0 ng/L- Negative Male for AMI  >=14 - Abnormal Female indicating possible myocardial injury.  >=22 - Abnormal Male indicating possible myocardial injury.   Clinicians would have to " utilize clinical acumen, EKG, Troponin, and serial changes to determine if it is an Acute Myocardial Infarction or myocardial injury due to an underlying chronic condition.        MAGNESIUM - Normal   TSH - Normal   T4, FREE - Normal    Narrative:     Results may be falsely increased if patient taking Biotin.     CBC WITH AUTO DIFFERENTIAL - Normal   CBC AND DIFFERENTIAL    Narrative:     The following orders were created for panel order CBC & Differential.  Procedure                               Abnormality         Status                     ---------                               -----------         ------                     CBC Auto Differential[[id number]]        Normal              Final result                 Please view results for these tests on the individual orders.       EKG:   ECG 12 Lead Stroke Evaluation   Preliminary Result   HEART RATE= 80  bpm   RR Interval= 750  ms   VA Interval= 180  ms   P Horizontal Axis= -67  deg   P Front Axis= 18  deg   QRSD Interval= 151  ms   QT Interval= 424  ms   QTcB= 490  ms   QRS Axis= -17  deg   T Wave Axis= 1  deg   - ABNORMAL ECG -   Sinus rhythm   Probable left atrial enlargement   Right bundle branch block   Electronically Signed By:    Date and Time of Study: 2023-11-22 11:57:29      SCANNED - TELEMETRY     Final Result          Meds given in ED:   Medications   iopamidol (ISOVUE-370) 76 % injection 95 mL (has no administration in time range)   labetalol (NORMODYNE,TRANDATE) injection 10 mg (10 mg Intravenous Given 11/22/23 1148)       Imaging results:  CT Head Without Contrast    Result Date: 11/22/2023  1. Large left parieto-occipital supratentorial hyperdense and partially calcified mass as discussed above. 2. This mass could conceivably represent a meningioma posterior arising from the tentorium or falx but further evaluation recommended with MRI of the brain with contrast. 3. Also correlation to any prior outside studies would be helpful. 4. Findings were  discussed with the ER physician.  Radiation dose reduction techniques were utilized, including automated exposure control and exposure modulation based on body size.        Ambulatory status:   - indep    Social issues:   Social History     Socioeconomic History    Marital status: Single   Tobacco Use    Smoking status: Never    Smokeless tobacco: Never   Substance and Sexual Activity    Alcohol use: Never    Drug use: Never    Sexual activity: Defer       NIH Stroke Scale:       Esthela New RN  11/22/23 14:29 EST          Electronically signed by Esthela New RN at 11/22/23 1429       Esthela New RN at 11/22/23 1206          Mri screening faxed   Dr wallace here to see     Electronically signed by Esthela New RN at 11/22/23 1206       Greg Person MD at 11/22/23 1115        Procedure Orders    1. Critical Care [[id number]] ordered by Greg Person MD                  EMERGENCY DEPARTMENT ENCOUNTER    Room Number:  18/18  PCP: Provider, No Known  Historian: Patient      HPI:  Chief Complaint: Intermittent blurry vision, hypertension  A complete HPI/ROS/PMH/PSH/SH/FH are unobtainable due to: None    Context: Chato Potts is a 63 y.o. male who presents to the ED after being referred from eye doctor for intermittent blurry vision for the last month and high blood pressure.  He was told by the eye doctor that did not have any abnormal exam findings but they are worried that potentially he could have nerve swelling although I did not visualize this in the office.  They told him they were worried about potentially having cancer.  Patient denies any headaches, dizziness, chest pains, shortness of breath, numbness or weakness of the arms or legs, balance issues.  No recent falls or head injuries.  Not on any current medications.      MEDICAL RECORD REVIEW    External (non-ED) record review: No prior charts for review in epic    PAST MEDICAL HISTORY  Active Ambulatory Problems     Diagnosis Date  Noted    No Active Ambulatory Problems     Resolved Ambulatory Problems     Diagnosis Date Noted    No Resolved Ambulatory Problems     No Additional Past Medical History         PAST SURGICAL HISTORY  History reviewed. No pertinent surgical history.      FAMILY HISTORY  History reviewed. No pertinent family history.      SOCIAL HISTORY  Social History     Socioeconomic History    Marital status: Single   Tobacco Use    Smoking status: Never    Smokeless tobacco: Never   Substance and Sexual Activity    Alcohol use: Never    Drug use: Never    Sexual activity: Defer         ALLERGIES  Patient has no known allergies.        REVIEW OF SYSTEMS  Review of Systems     All systems reviewed and negative except for those discussed in HPI.       PHYSICAL EXAM    I have reviewed the triage vital signs and nursing notes.    ED Triage Vitals   Temp Heart Rate Resp BP SpO2   11/22/23 1033 11/22/23 1033 11/22/23 1033 11/22/23 1044 11/22/23 1033   97.9 °F (36.6 °C) 109 19 (!) 205/128 97 %      Temp src Heart Rate Source Patient Position BP Location FiO2 (%)   11/22/23 1033 11/22/23 1033 11/22/23 1044 -- --   Tympanic Monitor Lying         Physical Exam  General: No acute distress, nontoxic  HEENT: Mucous membranes moist, atraumatic, EOMI, PERRL  Neck: Full ROM  Pulm: Symmetric chest rise, nonlabored, lungs CTAB  Cardiovascular: Regular rate and rhythm, intact distal pulses  GI: Soft, nontender, nondistended, no rebound, no guarding, bowel sounds present  MSK: Full ROM, no deformity  Skin: Warm, dry  Neuro: Awake, alert, oriented x 4, GCS 15, no facial droop, no dysarthria or aphasia, 5 out of 5 strength sensation bilateral upper and lower extremities, no pronator drift moving all extremities, no focal deficits  Psych: Calm, cooperative      NIH 0    LAB RESULTS  Recent Results (from the past 24 hour(s))   Comprehensive Metabolic Panel    Collection Time: 11/22/23 11:29 AM    Specimen: Blood   Result Value Ref Range    Glucose 97  65 - 99 mg/dL    BUN 14 8 - 23 mg/dL    Creatinine 1.03 0.76 - 1.27 mg/dL    Sodium 139 136 - 145 mmol/L    Potassium 3.3 (L) 3.5 - 5.2 mmol/L    Chloride 105 98 - 107 mmol/L    CO2 23.0 22.0 - 29.0 mmol/L    Calcium 8.9 8.6 - 10.5 mg/dL    Total Protein 6.4 6.0 - 8.5 g/dL    Albumin 4.7 3.5 - 5.2 g/dL    ALT (SGPT) 21 1 - 41 U/L    AST (SGOT) 19 1 - 40 U/L    Alkaline Phosphatase 78 39 - 117 U/L    Total Bilirubin 0.8 0.0 - 1.2 mg/dL    Globulin 1.7 gm/dL    A/G Ratio 2.8 g/dL    BUN/Creatinine Ratio 13.6 7.0 - 25.0    Anion Gap 11.0 5.0 - 15.0 mmol/L    eGFR 82.1 >60.0 mL/min/1.73   Protime-INR    Collection Time: 11/22/23 11:29 AM    Specimen: Blood   Result Value Ref Range    Protime 12.7 11.7 - 14.2 Seconds    INR 0.94 0.90 - 1.10   aPTT    Collection Time: 11/22/23 11:29 AM    Specimen: Blood   Result Value Ref Range    PTT 29.8 22.7 - 35.4 seconds   Single High Sensitivity Troponin T    Collection Time: 11/22/23 11:29 AM    Specimen: Blood   Result Value Ref Range    HS Troponin T 8 <22 ng/L   Magnesium    Collection Time: 11/22/23 11:29 AM    Specimen: Blood   Result Value Ref Range    Magnesium 2.2 1.6 - 2.4 mg/dL   TSH    Collection Time: 11/22/23 11:29 AM    Specimen: Blood   Result Value Ref Range    TSH 3.960 0.270 - 4.200 uIU/mL   T4, Free    Collection Time: 11/22/23 11:29 AM    Specimen: Blood   Result Value Ref Range    Free T4 0.94 0.93 - 1.70 ng/dL   CBC Auto Differential    Collection Time: 11/22/23 11:29 AM    Specimen: Blood   Result Value Ref Range    WBC 4.71 3.40 - 10.80 10*3/mm3    RBC 5.05 4.14 - 5.80 10*6/mm3    Hemoglobin 14.8 13.0 - 17.7 g/dL    Hematocrit 43.7 37.5 - 51.0 %    MCV 86.5 79.0 - 97.0 fL    MCH 29.3 26.6 - 33.0 pg    MCHC 33.9 31.5 - 35.7 g/dL    RDW 13.3 12.3 - 15.4 %    RDW-SD 41.8 37.0 - 54.0 fl    MPV 10.2 6.0 - 12.0 fL    Platelets 175 140 - 450 10*3/mm3    Neutrophil % 64.9 42.7 - 76.0 %    Lymphocyte % 20.4 19.6 - 45.3 %    Monocyte % 9.6 5.0 - 12.0 %    Eosinophil  % 3.4 0.3 - 6.2 %    Basophil % 1.5 0.0 - 1.5 %    Immature Grans % 0.2 0.0 - 0.5 %    Neutrophils, Absolute 3.06 1.70 - 7.00 10*3/mm3    Lymphocytes, Absolute 0.96 0.70 - 3.10 10*3/mm3    Monocytes, Absolute 0.45 0.10 - 0.90 10*3/mm3    Eosinophils, Absolute 0.16 0.00 - 0.40 10*3/mm3    Basophils, Absolute 0.07 0.00 - 0.20 10*3/mm3    Immature Grans, Absolute 0.01 0.00 - 0.05 10*3/mm3    nRBC 0.0 0.0 - 0.2 /100 WBC   ECG 12 Lead Stroke Evaluation    Collection Time: 11/22/23 11:57 AM   Result Value Ref Range    QT Interval 424 ms    QTC Interval 490 ms   Urinalysis With Microscopic If Indicated (No Culture) - Urine, Clean Catch    Collection Time: 11/22/23  1:18 PM    Specimen: Urine, Clean Catch   Result Value Ref Range    Color, UA Yellow Yellow, Straw    Appearance, UA Clear Clear    pH, UA 7.0 5.0 - 8.0    Specific Gravity, UA 1.008 1.005 - 1.030    Glucose, UA Negative Negative    Ketones, UA Negative Negative    Bilirubin, UA Negative Negative    Blood, UA Trace (A) Negative    Protein, UA Negative Negative    Leuk Esterase, UA Moderate (2+) (A) Negative    Nitrite, UA Negative Negative    Urobilinogen, UA 0.2 E.U./dL 0.2 - 1.0 E.U./dL   Urinalysis, Microscopic Only - Urine, Clean Catch    Collection Time: 11/22/23  1:18 PM    Specimen: Urine, Clean Catch   Result Value Ref Range    RBC, UA 0-2 None Seen, 0-2 /HPF    WBC, UA Too Numerous to Count (A) None Seen, 0-2 /HPF    Bacteria, UA 2+ (A) None Seen /HPF    Squamous Epithelial Cells, UA None Seen None Seen, 0-2 /HPF    Hyaline Casts, UA None Seen None Seen /LPF    Methodology Manual Light Microscopy        Ordered the above labs and independently interpreted results. My findings will be discussed in the medical decision making section below        RADIOLOGY  CT Head Without Contrast    Result Date: 11/22/2023  CT OF THE BRAIN WITHOUT CONTRAST 11/22/2023  HISTORY: Blurred vision. Headache.  Axial images were obtained through the brain without intravenous  contrast.  There is a large hyperdense and partially calcified left supratentorial mass which abuts the posterior falx and leftward aspect of the tentorium. The mass is seen in the posterior parieto-occipital region. It is difficult to determine if it is extra-axial or intra-axial. The mass measures up to 7.6 cm in craniocaudal dimension x 7.4 cm in AP dimension x 5.9 cm in transverse dimension. Small amount of surrounding edema is seen.  No other masses are seen. There is approximately 8 mm midline shift to the right. No intracranial hemorrhage is seen.  Ventricles do not appear significantly dilated. No bony lesions are seen.      1. Large left parieto-occipital supratentorial hyperdense and partially calcified mass as discussed above. 2. This mass could conceivably represent a meningioma posterior arising from the tentorium or falx but further evaluation recommended with MRI of the brain with contrast. 3. Also correlation to any prior outside studies would be helpful. 4. Findings were discussed with the ER physician.  Radiation dose reduction techniques were utilized, including automated exposure control and exposure modulation based on body size.        Ordered the above noted radiological studies.  Independently interpreted by me and my independent review of findings can be found in the ED Course.  See dictation for official radiology interpretation.      PROCEDURES    Critical Care    Performed by: Greg Person MD  Authorized by: Greg Person MD    Critical care provider statement:     Critical care time (minutes):  35    Critical care time was exclusive of:  Separately billable procedures and treating other patients    Critical care was necessary to treat or prevent imminent or life-threatening deterioration of the following conditions:  CNS failure or compromise and circulatory failure    Critical care was time spent personally by me on the following activities:  Development of treatment plan with  patient or surrogate, discussions with consultants, evaluation of patient's response to treatment, examination of patient, obtaining history from patient or surrogate, ordering and performing treatments and interventions, ordering and review of laboratory studies, ordering and review of radiographic studies, pulse oximetry, re-evaluation of patient's condition and review of old charts    Care discussed with: admitting provider      Care discussed with comment:  Dr. Anglin, admitting; Dr. Rondon, Neurology; Dr. Lane, Radiology; JACQUELINE Harvey, Neurosurgery    EKG - Per my independent interpretation at 12:00:    EKG Time: 1157  Rhythm/Rate: Sinus rhythm with rate of 80  Left axis deviation  Right bundle branch block  No acute ischemic changes  No STEMI       No prior for visual comparison      MEDICATIONS GIVEN IN ER    Medications   iopamidol (ISOVUE-370) 76 % injection 95 mL (has no administration in time range)   labetalol (NORMODYNE,TRANDATE) injection 10 mg (10 mg Intravenous Given 11/22/23 1148)         PROGRESS, DATA ANALYSIS, CONSULTS, AND MEDICAL DECISION MAKING    Please note that this section constitutes my independent interpretation of clinical data including lab results, radiology, EKG's.  This constitutes my independent professional opinion regarding differential diagnosis and management of this patient.  It may include any factors such as history from outside sources, review of external records, social determinants of health, management of medications, response to those treatments, and discussions with other providers.    Differential Diagnosis and Plan: Plan for stat neurology consult, likely neuroimaging of some sort, labs, blood pressure control, EKG, and reevaluation with results.  Initial concern for possible hypertensive urgency, intracranial mass, renal failure, electrolyte abnormalities, among others.      Additional sources:  - Discussed/ obtained information from independent  historians:       - Chronic or social conditions impacting care:      - Shared decision making:  Patient fully updated on and in agreement with the course and plan moving forward    ED Course as of 11/22/23 1428   Wed Nov 22, 2023   1121 Discussed with Dr. Rondon, Stroke Neurology, discussed patient's clinical course and findings today, treatment modalities, he does recommend blood pressure control with IV labetalol, MRI brain without contrast, and if MR brain is reassuring with improving blood pressure and otherwise reassuring work-up, can discharge with likely hypertensive induced symptoms. [DC]   1152 WBC: 4.71 [DC]   1152 Hemoglobin: 14.8 [DC]   1152 Platelets: 175 [DC]   1205 Glucose: 97 [DC]   1205 BUN: 14 [DC]   1205 Creatinine: 1.03 [DC]   1205 Sodium: 139 [DC]   1206 Potassium(!): 3.3 [DC]   1206 ALT (SGPT): 21 [DC]   1206 AST (SGOT): 19 [DC]   1206 Alkaline Phosphatase: 78 [DC]   1206 Total Bilirubin: 0.8 [DC]   1206 INR: 0.94 [DC]   1206 PTT: 29.8 [DC]   1206 Magnesium: 2.2 [DC]   1214 Free T4: 0.94 [DC]   1214 TSH Baseline: 3.960 [DC]   1214 HS Troponin T: 8 [DC]   1404 CT Head Without Contrast  Per my independent interpretation of the CT head, patient has a large left-sided intracranial mass [DC]   1409 Discussed with JACQUELINE Harvey, neurosurgery taking call for Dr. Rodriguez, she has reviewed the CT head, recommends MR brain with and without contrast and they will consult. [DC]   1426 Discussed with Dr. Anglin, [facility], discussed patient's clinical course and findings today, treatment modalities, consultations by neurology and neurosurgery, and need for hospitalization. [DC]      ED Course User Index  [DC] Greg Person MD     Patient and family at bedside been fully updated on findings today and recommendations and need for hospitalization for further blood pressure management and work-up of the intracranial mass.  All questions and concerns addressed.  He remains well-appearing in no acute distress  otherwise.    Hospitalization Considered?: yes    Orders Placed During This Visit:  Orders Placed This Encounter   Procedures    CT Head Without Contrast    MRI Brain With & Without Contrast    Comprehensive Metabolic Panel    Urinalysis With Microscopic If Indicated (No Culture) - Urine, Clean Catch    Protime-INR    aPTT    Single High Sensitivity Troponin T    Magnesium    TSH    T4, Free    CBC Auto Differential    Urinalysis, Microscopic Only - Urine, Clean Catch    Inpatient Neurology Consult Stroke    Neurosurgery (on-call MD unless specified)    [facility] (on-call MD unless specified) Details    ECG 12 Lead Stroke Evaluation    SCANNED - TELEMETRY      Inpatient Admission    CBC & Differential       Additional orders considered but not placed:      Independent interpretation of labs, radiology studies, and discussions with consultants: See ED Course        AS OF 14:28 EST VITALS:    BP - (!) 173/112  HR - 77  TEMP - 97.9 °F (36.6 °C) (Tympanic)  02 SATS - 95%        DIAGNOSIS  Final diagnoses:   Severe hypertension   Intracranial mass   Visual disturbance         DISPOSITION  HOSPITALIZATION    Discussed treatment plan and reason for hospitalization with pt/family and hospitalizing physician.  Pt/family voiced understanding of the plan for hospitalization for further testing/treatment as needed.                 --    Please note that portions of this were completed with a voice recognition program.       Note Disclaimer: At [facility], we believe that sharing information builds trust and better relationships. You are receiving this note because you are receiving care at [facility] or recently visited. It is possible you will see health information before a provider has talked with you about it. This kind of information can be easy to misunderstand. To help you fully understand what it means for your health, we urge you to discuss this note with your provider.           Greg Person MD  11/22/23  1817      Electronically signed by Greg Person MD at 11/22/23 1817       Monica Ellison, RN at 11/22/23 1045          Pt states that he has had bilateral intermittent blurred vision for the last month. States that his vision will be blurred for a few seconds and then clear up. Pt went to his eye doctor this morning and they told him to come here for possible swelling of his optic nerve.     Electronically signed by Monica Ellison, RN at 11/22/23 1046       Vital Signs (last day)       Date/Time Temp Temp src Pulse Resp BP Patient Position SpO2    11/24/23 1330 -- -- 94 -- 142/84 -- 93    11/24/23 1315 -- -- 123 -- 169/109 -- 95    11/24/23 1100 -- -- 126 -- 146/94 -- 97    11/24/23 1045 -- -- 117 -- 188/88 -- 97    11/24/23 1030 -- -- 109 -- 167/81 -- 100    11/24/23 1015 -- -- 101 -- 146/86 -- 96    11/24/23 1000 -- -- 100 -- 155/84 -- 96    11/24/23 0915 -- -- 107 -- 171/91 -- 95    11/24/23 0859 -- -- -- -- 154/99 -- --    11/24/23 0728 -- -- -- 18 -- Lying --    11/24/23 0445 98.4 (36.9) Oral -- 18 -- Lying --    11/23/23 2330 98.2 (36.8) Oral -- 18 140/84 Lying --    11/23/23 2225 -- -- 94 -- 150/92 -- 96    11/23/23 1530 -- -- 112 -- 163/84 -- 97    11/23/23 1515 98.3 (36.8) Oral 111 18 189/94 Lying 96    11/23/23 1500 -- -- 103 -- 182/94 -- 93    11/23/23 1448 -- -- 94 -- 185/96 -- 95    11/23/23 1246 -- -- 112 -- 200/109 -- --    11/23/23 1154 98.2 (36.8) Oral 112 -- 200/109 Sitting --    11/23/23 0733 97.3 (36.3) Oral 91 18 184/104 Lying 99    11/23/23 0444 98 (36.7) Oral -- 16 177/104 Lying --          Oxygen Therapy (last day)       Date/Time SpO2 Device (Oxygen Therapy) Flow (L/min) Oxygen Concentration (%) ETCO2 (mmHg)    11/24/23 1330 93 -- -- -- --    11/24/23 1315 95 -- -- -- --    11/24/23 1100 97 -- -- -- --    11/24/23 1045 97 -- -- -- --    11/24/23 1030 100 -- -- -- --    11/24/23 1015 96 -- -- -- --    11/24/23 1000 96 -- -- -- --    11/24/23 0915 95 -- -- -- --    11/24/23 0001  -- room air -- -- --    11/23/23 2330 -- room air -- -- --    11/23/23 2225 96 -- -- -- --    11/23/23 2025 -- room air -- -- --    11/23/23 1530 97 -- -- -- --    11/23/23 1515 96 room air -- -- --    11/23/23 1500 93 -- -- -- --    11/23/23 1448 95 -- -- -- --    11/23/23 1400 -- room air -- -- --    11/23/23 1154 -- room air -- -- --    11/23/23 0800 -- room air -- -- --    11/23/23 0733 99 room air -- -- --    11/23/23 0444 -- room air -- -- --          Lines, Drains & Airways       Active LDAs       Name Placement date Placement time Site Days    Peripheral IV 11/22/23 1145 Anterior;Distal;Right;Upper Arm 11/22/23  1145  Arm  2    Peripheral IV 11/23/23 2000 Posterior;Right Forearm 11/23/23 2000  Forearm  less than 1              Inactive LDAs       None                  Facility-Administered Medications as of 11/24/2023   Medication Dose Route Frequency Provider Last Rate Last Admin    acetaminophen (TYLENOL) tablet 650 mg  650 mg Oral Q4H PRN Jeremias Anglin MD        amLODIPine (NORVASC) tablet 5 mg  5 mg Oral Q24H Jeremias Anglin MD   5 mg at 11/24/23 0903    cefTRIAXone (ROCEPHIN) 1,000 mg in sodium chloride 0.9 % 100 mL IVPB-VTB  1,000 mg Intravenous Q24H Jose Daniel Cardenas  mL/hr at 11/24/23 0903 1,000 mg at 11/24/23 0903    dexAMETHasone (DECADRON) injection 4 mg  4 mg Intravenous Q6H Jill Kim APRN   4 mg at 11/24/23 1251    famotidine (PEPCID) injection 20 mg  20 mg Intravenous Q12H Jill Kim APRN   20 mg at 11/24/23 0903    [COMPLETED] gadobenate dimeglumine (MULTIHANCE) injection 20 mL  20 mL Intravenous Once in imaging Jeremias Anglin MD   20 mL at 11/22/23 1555    influenza vac split quad (FLUZONE,FLUARIX,AFLURIA,FLULAVAL) injection 0.5 mL  0.5 mL Intramuscular During Hospitalization Jeremias Anglin MD        [COMPLETED] iopamidol (ISOVUE-370) 76 % injection 95 mL  95 mL Intravenous Once in imaging Jeremias Anglin MD   85 mL at 11/24/23 0932    [COMPLETED] labetalol  (NORMODYNE,TRANDATE) injection 10 mg  10 mg Intravenous Once Greg Person MD   10 mg at 23 1148    labetalol (NORMODYNE,TRANDATE) injection 10 mg  10 mg Intravenous Q2H PRN Jeremias Anglin MD        levETIRAcetam (KEPPRA) tablet 500 mg  500 mg Oral BID Jill Kim, JACQUELINE   500 mg at 23 0903    losartan (COZAAR) tablet 25 mg  25 mg Oral Q24H Jose Daniel Cardenas MD   25 mg at 23 0903    melatonin tablet 3 mg  3 mg Oral Nightly PRN Jeremias Anglin MD        niCARdipine (CARDENE) 25 mg in 250 mL NS infusion kit  5-15 mg/hr Intravenous Titrated Jose Daniel Cardenas MD 50 mL/hr at 23 1340 5 mg/hr at 23 1340    nitroglycerin (NITROSTAT) SL tablet 0.4 mg  0.4 mg Sublingual Q5 Min PRN Jeremias Anglin MD        ondansetron (ZOFRAN) tablet 4 mg  4 mg Oral Q6H PRN Jeremias Anglin MD        Or    ondansetron (ZOFRAN) injection 4 mg  4 mg Intravenous Q6H PRN Jeremias Anglin MD        [COMPLETED] perflutren (DEFINITY) 8.476 mg in Sodium Chloride (PF) 0.9 % 10 mL injection  2 mL Intravenous Once in imaging Jeremias Anglin MD   2 mL at 23 1248    [COMPLETED] potassium chloride (K-DUR,KLOR-CON) ER tablet 40 mEq  40 mEq Oral Once Jeremias Anglin MD   40 mEq at 23 1825    [] sodium chloride 0.9 % infusion  100 mL/hr Intravenous Continuous Jose Daniel Cardenas  mL/hr at 23 0249 100 mL/hr at 23 0249     Orders (last 72 hrs)        Start     Ordered    23 0001  NPO Diet NPO Type: Sips with Meds  Diet Effective Midnight         23 1624    23 0600  Hemoglobin A1c  Morning Draw         23 1149    23 1300  Strict Intake & Output  Every Hour       23 1201    23 1202  OT Consult: Eval & Treat  Once         23 1201    23 1202  PT Consult: Eval & Treat Discharge Placement Assessment, Functional Mobility Below Baseline  Once         23 1201    23 1202  Aspiration Precautions  Continuous         23 1201    23  1202  Elevate HOB  Continuous         11/24/23 1201    11/24/23 1202  Fall Precautions  Continuous         11/24/23 1201    11/24/23 1202  Daily Weights  Daily       11/24/23 1201    11/24/23 1202  Notify Provider Acute Urinary Retention  Until Discontinued         11/24/23 1201    11/24/23 0600  CBC & Differential  Daily       11/23/23 1619    11/24/23 0600  Basic Metabolic Panel  Daily       11/23/23 1619    11/24/23 0600  CBC Auto Differential  PROCEDURE ONCE         11/23/23 2202    11/23/23 1700  sodium chloride 0.9 % infusion  Continuous         11/23/23 1612    11/23/23 1400  niCARdipine (CARDENE) 25 mg in 250 mL NS infusion kit  Titrated         11/23/23 1307    11/23/23 1400  losartan (COZAAR) tablet 25 mg  Every 24 Hours Scheduled         11/23/23 1308    11/23/23 1345  perflutren (DEFINITY) 8.476 mg in Sodium Chloride (PF) 0.9 % 10 mL injection  Once in Imaging         11/23/23 1248    11/23/23 1323  Urine Culture - Urine, Urine, Clean Catch  Once        Comments: Please ensure 'Use Existing Specimen' is selected if this order is for urine culture add-on.  If no button appears, please contact the lab for assistance.      11/23/23 1322    11/23/23 1309  Urinalysis With Culture If Indicated - Urine, Clean Catch  Once,   Status:  Canceled        Comments: Please ensure 'Use Existing Specimen' is selected if this order is for urine culture add-on.  If no button appears, please contact the lab for assistance.      11/23/23 1309    11/23/23 1000  cefTRIAXone (ROCEPHIN) 1,000 mg in sodium chloride 0.9 % 100 mL IVPB-VTB  Every 24 Hours         11/23/23 0901    11/23/23 0902  Reason for Discontinuing Lactic Acid: Not Septic  Once         11/23/23 0901    11/23/23 0600  Basic Metabolic Panel  Morning Draw,   Status:  Canceled         11/22/23 1707    11/23/23 0600  Renal Function Panel  Morning Draw         11/22/23 1707    11/22/23 2100  levETIRAcetam (KEPPRA) tablet 500 mg  2 Times Daily        Note to Pharmacy:  For tube route administration disperse crushed tablets in 10 mL of water, shake for 5 minutes to dissolve, and administer immediately via enteral feeding tube.    11/22/23 1623    11/22/23 2100  famotidine (PEPCID) injection 20 mg  Every 12 Hours Scheduled         11/22/23 1623    11/22/23 2000  Vital Signs  Every 4 Hours      Comments: Per per hospital policy    11/22/23 1707    11/22/23 1839  PT Consult: Eval & Treat Functional Mobility Below Baseline  Once         11/22/23 1838    11/22/23 1839  OT Consult: Eval & Treat  Once         11/22/23 1838    11/22/23 1839  SLP Consult: Eval & Treat Swallow Disorder  Once         11/22/23 1838    11/22/23 1838  Adult Transthoracic Echo Complete W/ Cont if Necessary Per Protocol  Once         11/22/23 1837    11/22/23 1800  Oral Care  2 Times Daily       11/22/23 1707    11/22/23 1800  amLODIPine (NORVASC) tablet 5 mg  Every 24 Hours Scheduled         11/22/23 1707    11/22/23 1800  potassium chloride (K-DUR,KLOR-CON) ER tablet 40 mEq  Once         11/22/23 1707    11/22/23 1722  Inpatient Case Management  Consult  Once        Provider:  (Not yet assigned)    11/22/23 1722    11/22/23 1721  influenza vac split quad (FLUZONE,FLUARIX,AFLURIA,FLULAVAL) injection 0.5 mL  During Hospitalization         11/22/23 1722    11/22/23 1708  Continuous Cardiac Monitoring  Continuous        Comments: Follow Standing Orders As Outlined in Process Instructions (Open Order Report to View Full Instructions)    11/22/23 1707    11/22/23 1708  Telemetry - Maintain IV Access  Continuous         11/22/23 1707    11/22/23 1708  Telemetry - Place Orders & Notify Provider of Results When Patient Experiences Acute Chest Pain, Dysrhythmia or Respiratory Distress  Until Discontinued         11/22/23 1707    11/22/23 1708  May Be Off Telemetry for Tests  Continuous         11/22/23 1707    11/22/23 1708  Place Sequential Compression Device  Once         11/22/23 1707    11/22/23 1708   Maintain Sequential Compression Device  Continuous         11/22/23 1707    11/22/23 1708  Intake & Output  Every Shift       11/22/23 1707    11/22/23 1708  Diet: Cardiac Diets; Healthy Heart (2-3 Na+); Texture: Regular Texture (IDDSI 7); Fluid Consistency: Thin (IDDSI 0)  Diet Effective Now         11/22/23 1707    11/22/23 1708  Bowel Regimen Not Indicated  Once         11/22/23 1707    11/22/23 1707  labetalol (NORMODYNE,TRANDATE) injection 10 mg  Every 2 Hours PRN         11/22/23 1707    11/22/23 1707  nitroglycerin (NITROSTAT) SL tablet 0.4 mg  Every 5 Minutes PRN         11/22/23 1707    11/22/23 1707  acetaminophen (TYLENOL) tablet 650 mg  Every 4 Hours PRN         11/22/23 1707    11/22/23 1707  ondansetron (ZOFRAN) tablet 4 mg  Every 6 Hours PRN        See Hyperspace for full Linked Orders Report.    11/22/23 1707    11/22/23 1707  ondansetron (ZOFRAN) injection 4 mg  Every 6 Hours PRN        See Hyperspace for full Linked Orders Report.    11/22/23 1707    11/22/23 1707  melatonin tablet 3 mg  Nightly PRN         11/22/23 1707    11/22/23 1700  dexAMETHasone (DECADRON) injection 4 mg  Every 6 Hours         11/22/23 1623    11/22/23 1626  CT Abdomen Pelvis With Contrast  1 Time Imaging         11/22/23 1625    11/22/23 1625  CT Chest With Contrast Diagnostic  1 Time Imaging         11/22/23 1625    11/22/23 1601  Code Status and Medical Interventions:  Continuous         11/22/23 1601    11/22/23 1550  gadobenate dimeglumine (MULTIHANCE) injection 20 mL  Once in Imaging         11/22/23 1534    11/22/23 1430  Critical Care  Once        Comments: This order was created via procedure documentation    11/22/23 1429    11/22/23 1429  Cardiac Monitoring  Continuous,   Status:  Canceled        Comments: Follow Standing Orders As Outlined in Process Instructions (Open Order Report to View Full Instructions)    11/22/23 1428    11/22/23 1428  Inpatient Admission  Once         11/22/23 1428    11/22/23 1411  [facility]  (on-call MD unless specified) Details  Once,   Status:  Canceled        Specialty:  Hospitalist  Provider:  Jeremias Anglin MD    11/22/23 1410    11/22/23 1410  MRI Brain With & Without Contrast  1 Time Imaging         11/22/23 1409    11/22/23 1402  iopamidol (ISOVUE-370) 76 % injection 95 mL  Once in Imaging         11/22/23 1346    11/22/23 1355  CT Head Without Contrast  1 Time Imaging         11/22/23 1156    11/22/23 1353  Neurosurgery (on-call MD unless specified)  STAT        Specialty:  Neurosurgery  Provider:  Jill Mccarty APRN    11/22/23 1353    11/22/23 1336  Urinalysis, Microscopic Only - Urine, Clean Catch  Once         11/22/23 1335    11/22/23 1157  CT venogram head  1 Time Imaging,   Status:  Canceled         11/22/23 1156    11/22/23 1137  labetalol (NORMODYNE,TRANDATE) injection 10 mg  Once         11/22/23 1121    11/22/23 1122  MRI Brain Without Contrast  1 Time Imaging,   Status:  Canceled         11/22/23 1121    11/22/23 1115  Inpatient Neurology Consult Stroke  STAT        Specialty:  Neurology  Provider:  Pierre Rondon MD    11/22/23 1115    11/22/23 1105  CBC & Differential  Once         11/22/23 1105    11/22/23 1105  Comprehensive Metabolic Panel  Once         11/22/23 1105    11/22/23 1105  Urinalysis With Microscopic If Indicated (No Culture) - Urine, Clean Catch  Once         11/22/23 1105    11/22/23 1105  Protime-INR  Once         11/22/23 1105    11/22/23 1105  aPTT  Once         11/22/23 1105    11/22/23 1105  Single High Sensitivity Troponin T  Once         11/22/23 1105    11/22/23 1105  Magnesium  Once         11/22/23 1105    11/22/23 1105  TSH  Once         11/22/23 1105    11/22/23 1105  T4, Free  Once         11/22/23 1105    11/22/23 1105  ECG 12 Lead Stroke Evaluation  STAT         11/22/23 1105    11/22/23 1105  CBC Auto Differential  PROCEDURE ONCE         11/22/23 1105    Unscheduled  Up with assistance  As Needed       11/22/23 1707    Unscheduled   Assess Patient For Urinary Retention  As Needed      Comments: Signs / Symptoms Urinary Retention:  - Bladder Palpable  - Suprapubic / Bladder Discomfort or Pain  - Urge to Void, But Unable  - Reports Unable to Void After 8 Hours    11/24/23 1201    Unscheduled  Utilize Voiding Measures if Retention is Suspected  As Needed      Comments: Voiding Measures:  - Privacy  - Relaxed Environment  - Suprapubic Massage  - Suprapubic Warm Compress  - Trigger Techniques  - Stand to Void   - Bedside Commode    11/24/23 1201    Unscheduled  Bladder Scan for Suspected Urinary Retention  As Needed       11/24/23 1201    Unscheduled  Monitor Every 1-2 Hours for Spontaneous Void if Bladder Scan Volume is Less Than 500mL & Patient is Without Symptoms of Bladder Discomfort / Distention  As Needed       11/24/23 1201    Unscheduled  Straight Cath For Acute Retention if Bladder Scan Volume is Greater Than 500mL or Patient Has Symptoms of Bladder Discomfort / Distention (Unless Contraindicated)  As Needed       11/24/23 1201    --  SCANNED - TELEMETRY           11/22/23 0000    --  SCANNED - TELEMETRY           11/22/23 0000    --  SCANNED - TELEMETRY           11/22/23 0000    --  SCANNED - TELEMETRY           11/22/23 0000    --  SCANNED - TELEMETRY           11/22/23 0000    --  SCANNED - TELEMETRY           11/22/23 0000    --  SCANNED - TELEMETRY           11/22/23 0000                     Physician Progress Notes         Jill Kim, APRN at 11/24/23 1114       Attestation signed by Chato Odell IV, MD at 11/24/23 1300    I have reviewed this documentation and agree.                    [facility] NEUROSURGERY INTRACRANIAL PROGRESS NOTE    PATIENT IDENTIFICATION:   Name:  Chato Potts      MRN:  [id number]     63 y.o.  male               CC: brain mass      Subjective     Interval History:  had CT C/A/P. Reports no blurred vision since admission. No PALACIO    ROS:  HEENT: No headache, no vision changes  Neuro: No numbness, tingling,  or weakness, no speech difficulties, no balance difficulties    Objective     Vital signs in last 24 hours:  Temp:  [98.2 °F (36.8 °C)-98.4 °F (36.9 °C)] 98.4 °F (36.9 °C)  Heart Rate:  [] 94  Resp:  [18] 18  BP: (140-200)/() 154/99      Intake/Output this shift:  I/O this shift:  In: -   Out: 550 [Urine:550]      Intake/Output last 3 shifts:  I/O last 3 completed shifts:  In: 240 [P.O.:240]  Out: 2025 [Urine:2025]    LABS:  Results from last 7 days   Lab Units 11/24/23  0535 11/22/23  1129   WBC 10*3/mm3 12.74* 4.71   HEMOGLOBIN g/dL 14.2 14.8   HEMATOCRIT % 42.6 43.7   PLATELETS 10*3/mm3 199 175     Results from last 7 days   Lab Units 11/24/23  0535 11/23/23  0425 11/22/23  1129   SODIUM mmol/L 140 141 139   POTASSIUM mmol/L 3.6 4.2 3.3*   CHLORIDE mmol/L 109* 108* 105   CO2 mmol/L 20.0* 20.9* 23.0   BUN mg/dL 17 13 14   CREATININE mg/dL 0.75* 0.99 1.03   CALCIUM mg/dL 8.6 9.0 8.9   BILIRUBIN mg/dL  --   --  0.8   ALK PHOS U/L  --   --  78   ALT (SGPT) U/L  --   --  21   AST (SGOT) U/L  --   --  19   GLUCOSE mg/dL 147* 156* 97     Urine Culture 11/22-growth present, too young to evaluate     IMAGING STUDIES:  CT CHEST W CONTRAST DIAGNOSTIC-, CT ABDOMEN PELVIS W CONTRAST-     Radiation dose reduction techniques were utilized, including automated  exposure control and exposure modulation based on body size.     Clinical: Brain mass, evaluate for primary source     FINDINGS:  1. Cardiac size within normal limits. Subtle diffuse wall thickening of  the distal one third of the esophagus perhaps related to esophagitis.  Caliber of the esophagus is normal. No mediastinal or hilar  mass/lymphadenopathy. Diameter of the ascending thoracic aorta upper  limits of normal at 3.8 cm. There is biapical pleural thickening scar  formation which is symmetric and benign-appearing. No pleural effusion,  active airspace disease, vascular congestion or suspicious pulmonary  lesion.     2. The liver, gallbladder, pancreas,  spleen, adrenal glands and kidneys  are normal. No calculus or obstructive uropathy, no biliary duct  dilatation. Diameter of the aorta is within normal limits. The urinary  bladder is typical in appearance. Prostate and seminal vesicles  unremarkable        3. The stomach, appendix, colon and small bowel have a satisfactory  appearance. No induration of the mesentery to suggest an inflammatory  process. No wall thickening seen to suggest underlying neoplasm. No free  air or free intraperitoneal fluid. No lytic or sclerotic bone lesion  seen.     CONCLUSION: There is no indication of primary lesion involving the  thorax, abdomen or pelvis. No indication of metastasis. No adenopathy  seen.    I personally viewed and interpreted the patient's chart.    Meds reviewed/changed: Yes    Current Facility-Administered Medications:     acetaminophen (TYLENOL) tablet 650 mg, 650 mg, Oral, Q4H PRN, Jeremias Anglin MD    amLODIPine (NORVASC) tablet 5 mg, 5 mg, Oral, Q24H, Jeremias Anglin MD, 5 mg at 11/24/23 0903    cefTRIAXone (ROCEPHIN) 1,000 mg in sodium chloride 0.9 % 100 mL IVPB-VTB, 1,000 mg, Intravenous, Q24H, Jose Daniel Cardenas MD, Last Rate: 200 mL/hr at 11/24/23 0903, 1,000 mg at 11/24/23 0903    dexAMETHasone (DECADRON) injection 4 mg, 4 mg, Intravenous, Q6H, Jill Kim APRN, 4 mg at 11/24/23 0455    famotidine (PEPCID) injection 20 mg, 20 mg, Intravenous, Q12H, Jill Kim APRN, 20 mg at 11/24/23 0903    influenza vac split quad (FLUZONE,FLUARIX,AFLURIA,FLULAVAL) injection 0.5 mL, 0.5 mL, Intramuscular, During Hospitalization, Jeremias Anglin MD    labetalol (NORMODYNE,TRANDATE) injection 10 mg, 10 mg, Intravenous, Q2H PRN, Jeremias Anglin MD    levETIRAcetam (KEPPRA) tablet 500 mg, 500 mg, Oral, BID, Jill Kim APRN, 500 mg at 11/24/23 0903    losartan (COZAAR) tablet 25 mg, 25 mg, Oral, Q24H, Jose Daniel Cardenas MD, 25 mg at 11/24/23 0903    melatonin tablet 3 mg, 3 mg, Oral, Nightly PRN, Ray,  MD Jeremias    niCARdipine (CARDENE) 25 mg in 250 mL NS infusion kit, 5-15 mg/hr, Intravenous, Titrated, Jose Daniel Cardenas MD, Last Rate: 50 mL/hr at 11/24/23 0859, 5 mg/hr at 11/24/23 0859    nitroglycerin (NITROSTAT) SL tablet 0.4 mg, 0.4 mg, Sublingual, Q5 Min PRN, Jeremias Anglin MD    ondansetron (ZOFRAN) tablet 4 mg, 4 mg, Oral, Q6H PRN **OR** ondansetron (ZOFRAN) injection 4 mg, 4 mg, Intravenous, Q6H PRN, Jeremias Anglin MD      Physical Exam:    General:   Awake, alert, oriented x3. Speech clear with no aphasia  CN II-XII:   Right homonymous hemianopsia otherwise cranial nerves intact including partial right 3rd nerve palsy that was present the other day has resolved  Motor:    Moving all extremities.  No drift  Station and Gait:  Up ad [place].  Coordination:  Finger-to-nose test shows no dysmetria.      Assessment & Plan     ASSESSMENT:      Hypertensive emergency    Brain mass    Vision disturbance    Hypokalemia    Brain compression    Brain edema    Acute cystitis without hematuria    Patient with large left occipital brain mass and right homonymous hemianopsia.  Ataxia right upper extremity improved.  He reports no episodes of blurred vision.  CT chest abdomen pelvis unremarkable for malignancy.  Will likely need tumor extraction with precraniotomy embolization.  Definitive surgical plans still pending but will need to be n.p.o. midnight for possible surgery Monday.  Currently being treated for acute cystitis, on Rocephin.  Kindly request medical clearance from primary service for surgery or whether we need to hold off due to UTI    PLAN:   Continue steroid and Keppra  We will check in on Sunday to ensure patient is medically clear for surgery, currently being treated for UTI.  N.p.o. midnight Monday 11/27-possible craniotomy +/- precrani embolization    I discussed the patient's findings and my recommendations with patient and family    During patient visit, I utilized appropriate personal protective  "equipment including gloves.  Appropriate PPE was worn during the entire visit.  Hand hygiene was completed before and after.      LOS: 2 days       Jill Kim, APRN  2023  11:14 EST    \"Dictated utilizing Dragon dictation\".        Electronically signed by Chato Odell IV, MD at 23 1300       Mikel Leonard DO at 23 1030              Name: Chato Potts ADMIT: 2023   : 1960  PCP: Provider, No Known    MRN: [id number] LOS: 2 days   AGE/SEX: 63 y.o. male  ROOM: [facility]     Subjective   Subjective   Patient seen and examined this morning. Hospital day 2. At time of my examination, patient is awake, alert and resting in bed. Visual disturbances noted previously have improved. At present, he denies chest pain, palpitations, dyspnea, dizziness, lightheadedness or other acute complaints. Discussed with nursing, he remains on Cardene drip at this time, have been unable to wean due to blood pressure readings.    Objective   Objective   Vital Signs  Temp:  [98.2 °F (36.8 °C)-98.4 °F (36.9 °C)] 98.4 °F (36.9 °C)  Heart Rate:  [] 94  Resp:  [18] 18  BP: (140-200)/() 154/99  SpO2:  [93 %-97 %] 96 %  on   ;   Device (Oxygen Therapy): room air  Body mass index is 28.86 kg/m².  Physical Exam  Vitals and nursing note reviewed.   Constitutional:       General: He is awake. He is not in acute distress.     Appearance: He is overweight.   HENT:      Head: Atraumatic.   Eyes:      General: No scleral icterus.     Conjunctiva/sclera: Conjunctivae normal.   Cardiovascular:      Rate and Rhythm: Normal rate and regular rhythm.      Pulses: Normal pulses.      Heart sounds: Normal heart sounds.   Pulmonary:      Effort: Pulmonary effort is normal. No respiratory distress.      Breath sounds: Normal breath sounds.   Abdominal:      General: Bowel sounds are normal.      Palpations: Abdomen is soft.      Tenderness: There is no abdominal tenderness.   Skin:     General: Skin is warm and dry. " "  Neurological:      General: No focal deficit present.      Mental Status: He is alert.   Psychiatric:         Behavior: Behavior is cooperative.       Results Review     I reviewed the patient's new clinical results.  Results from last 7 days   Lab Units 11/24/23  0535 11/22/23  1129   WBC 10*3/mm3 12.74* 4.71   HEMOGLOBIN g/dL 14.2 14.8   PLATELETS 10*3/mm3 199 175     Results from last 7 days   Lab Units 11/24/23  0535 11/23/23  0425 11/22/23  1129   SODIUM mmol/L 140 141 139   POTASSIUM mmol/L 3.6 4.2 3.3*   CHLORIDE mmol/L 109* 108* 105   CO2 mmol/L 20.0* 20.9* 23.0   BUN mg/dL 17 13 14   CREATININE mg/dL 0.75* 0.99 1.03   GLUCOSE mg/dL 147* 156* 97   EGFR mL/min/1.73 102.0 86.1 82.1     Results from last 7 days   Lab Units 11/23/23  0425 11/22/23  1129   ALBUMIN g/dL 4.4 4.7   BILIRUBIN mg/dL  --  0.8   ALK PHOS U/L  --  78   AST (SGOT) U/L  --  19   ALT (SGPT) U/L  --  21     Results from last 7 days   Lab Units 11/24/23  0535 11/23/23  0425 11/22/23  1129   CALCIUM mg/dL 8.6 9.0 8.9   ALBUMIN g/dL  --  4.4 4.7   MAGNESIUM mg/dL  --   --  2.2   PHOSPHORUS mg/dL  --  2.0*  --        No results found for: \"HGBA1C\", \"POCGLU\"    MRI Brain With & Without Contrast    Result Date: 11/22/2023   There is a large partially calcified dural-based mass with a broad base with respect to the left aspect of the falx cerebri medial to the left parietal and occipital lobes. The mass measures up to 7.6 x 5.8 x 8.2 cm in greatest dimensions and abuts the medial aspect of the superior portion of the left tentorial leaf with which it may have an attachment as well. The falx cerebri is bowed to the right and a small component along the right aspect of the falx cerebri medial to the right occipital lobe cannot be entirely excluded. There is marked mass effect upon the medial aspect of the left parietal and occipital lobes with marked sulcal effacement as well as ventricular effacement of the posterior aspect of the left lateral " ventricle. There is shift of the midline structures to the right by approximately 8 mm. A small amount of vasogenic edema is seen within the left parietal and occipital lobes and this extends into the posterior aspect of the left temporal lobe. There is herniation of a small focus of the posterior and medial portion of the left temporal lobe through the tentorial incisura. Again, this lesion is most likely representative of a large meningioma.  This report was finalized on 11/22/2023 4:41 PM by Dr. Trenton Feliciano M.D on Workstation: [id number]      CT Head Without Contrast    Result Date: 11/22/2023  1. Large left parieto-occipital supratentorial hyperdense and partially calcified mass as discussed above. 2. This mass could conceivably represent a meningioma posterior arising from the tentorium or falx but further evaluation recommended with MRI of the brain with contrast. 3. Also correlation to any prior outside studies would be helpful. 4. Findings were discussed with the ER physician.  Radiation dose reduction techniques were utilized, including automated exposure control and exposure modulation based on body size.        I have personally reviewed all medications:  Scheduled Medications  amLODIPine, 5 mg, Oral, Q24H  cefTRIAXone, 1,000 mg, Intravenous, Q24H  dexAMETHasone, 4 mg, Intravenous, Q6H  famotidine, 20 mg, Intravenous, Q12H  levETIRAcetam, 500 mg, Oral, BID  losartan, 25 mg, Oral, Q24H    Infusions  niCARdipine, 5-15 mg/hr, Last Rate: 5 mg/hr (11/24/23 0859)    Diet  Diet: Cardiac Diets; Healthy Heart (2-3 Na+); Texture: Regular Texture (IDDSI 7); Fluid Consistency: Thin (IDDSI 0)  NPO Diet NPO Type: Sips with Meds    I have personally reviewed:  [x]  Laboratory   [x]  Microbiology   [x]  Radiology   [x]  EKG/Telemetry  [x]  Cardiology/Vascular   []  Pathology    []  Records      Assessment/Plan     Active Hospital Problems    Diagnosis  POA    **Hypertensive emergency [I16.1]  Yes    Acute cystitis  "without hematuria [N30.00]  Yes    Brain mass [G93.89]  Yes    Vision disturbance [H53.9]  Yes    Hypokalemia [E87.6]  Yes    Brain compression [G93.5]  Yes    Brain edema [G93.6]  Yes      Resolved Hospital Problems   No resolved problems to display.       63 y.o. male admitted with Hypertensive emergency.    Hypertensive Emergency  - Patient noted to have SBP >200 and DBP >120 on arrival, coupled with papilledema. Findings consistent with hypertensive emergency. 2D Echo (11/23/23) showing Left ventricular systolic function is hyperdynamic (EF > 70%), LVOT pressure gradient 17 at rest and 32 with provocation, likely a result of hyperdynamic left ventricle. Also noting mild to moderate aortic regurgitation.  - He remains on Cardene drip at this time, in addition to Norvasc and Losartan. Most recent BP reading have SBP <160, discussed with nursing, going to attempt to wean drip at this time and see if BP will remain stable.  - Appreciate neurology recs.     Brain Mass  - MRI brain obtained and noted \"large partially calcified dural-based mass with a broad base with respect to the left aspect of the falx cerebri medial to the left   parietal and occipital lobes\" with mass effect upon the medial aspect of the left parietal and occipital lobes with marked sulcal effacement and ventricular effacement of the posterior aspect of the lateral left ventricle, covered with 8 mm of midline shift to the right and small amount of vasogenic edema.  - Imaging findings coupled with patient's endorsement of visual field deficits and RUE ataxia.  - Neurosurgery consulted and following. He is on Decadron at present,  appreciate MALINDA recs, plans for craniotomy +/- prior embolization depending on whether the tumor is primary or secondary-intervention planned early next week.  - CT C/A/P obtained, however, no evidence of findings concerning for malignant process.  - Keppra for seizure prophylaxis, Famotidine for GI prophylaxis.  - Closely " monitor, fall/aspiration precautions, PT/OT, follow up Neurosurgery plans and recommendations.    Acute cystitis without hematuria  - Patient presenting with symptoms of dysuria coupled with UA findings showing 2+ leukocyte esterase, TNTC WBC, 2+ bacteria.  He is currently on ceftriaxone, urine culture pending.  - Continue empiric ceftriaxone as prescribed for now while awaiting results of further workup.  - Acute urinary retention protocol.    Leukocytosis  - WBC count elevated on most recent labs. Etiology of WBC elevation likely a result of infection + corticosteroids.   - Order repeat CBC in AM for reassessment.      Hyperglycemia  - Glucose elevated on most recent labs. Patient without known history of T2DM.  Etiology likely a result of corticosteroids, no prior A1c available for review.  - Order repeat A1c at this time.  - Monitor for now, continue with POC glucose checks, can add correctional SSI if needed.    Hypokalemia, resolved  - Potassium noted to be low previously, repleted per electrolyte protocol and improved on most recent testing.      SCDs for DVT prophylaxis.  Full code.  Discussed with patient, family, and nursing staff.  Anticipate discharge  TBD  timing yet to be determined.      Mikel Leonard DO  [facility]  23          Electronically signed by Mikel Leonard DO at 23 1200       Jose Daniel Cardenas MD at 23 1608              Name: Chato Potts ADMIT: 2023   : 1960  PCP: Provider, No Known    MRN: [id number] LOS: 1 days   AGE/SEX: 63 y.o. male  ROOM: [facility]     Subjective   Subjective   No acute events. Patient has no new complaints. His vision has improved. Taking PO. No HA/CP/dyspnea/f/c/n/v/d.  His brother is at bedside.    Objective   Objective   Vital Signs  Temp:  [97.3 °F (36.3 °C)-98.8 °F (37.1 °C)] 98.3 °F (36.8 °C)  Heart Rate:  [] 112  Resp:  [16-18] 18  BP: (118-200)/() 163/84  SpO2:  [93 %-99 %] 97 %  on   ;   Device  (Oxygen Therapy): room air  Body mass index is 28.86 kg/m².  Physical Exam  Vitals and nursing note reviewed.   Constitutional:       General: He is not in acute distress.     Appearance: He is not toxic-appearing.   HENT:      Head: Normocephalic and atraumatic.      Nose: Nose normal.      Mouth/Throat:      Mouth: Mucous membranes are moist.      Pharynx: Oropharynx is clear.   Eyes:      Conjunctiva/sclera: Conjunctivae normal.      Pupils: Pupils are equal, round, and reactive to light.   Cardiovascular:      Rate and Rhythm: Normal rate and regular rhythm.      Pulses: Normal pulses.   Pulmonary:      Effort: Pulmonary effort is normal.      Breath sounds: Normal breath sounds. No rales.   Abdominal:      General: Bowel sounds are normal.      Palpations: Abdomen is soft.      Tenderness: There is no abdominal tenderness.   Musculoskeletal:         General: No swelling or tenderness.      Cervical back: Neck supple.   Skin:     General: Skin is warm and dry.      Capillary Refill: Capillary refill takes less than 2 seconds.   Neurological:      Mental Status: He is alert and oriented to person, place, and time.   Psychiatric:         Mood and Affect: Mood normal.         Behavior: Behavior normal.       Results Review     I reviewed the patient's new clinical results.  Results from last 7 days   Lab Units 11/22/23  1129   WBC 10*3/mm3 4.71   HEMOGLOBIN g/dL 14.8   PLATELETS 10*3/mm3 175     Results from last 7 days   Lab Units 11/23/23  0425 11/22/23  1129   SODIUM mmol/L 141 139   POTASSIUM mmol/L 4.2 3.3*   CHLORIDE mmol/L 108* 105   CO2 mmol/L 20.9* 23.0   BUN mg/dL 13 14   CREATININE mg/dL 0.99 1.03   GLUCOSE mg/dL 156* 97   EGFR mL/min/1.73 86.1 82.1     Results from last 7 days   Lab Units 11/23/23  0425 11/22/23  1129   ALBUMIN g/dL 4.4 4.7   BILIRUBIN mg/dL  --  0.8   ALK PHOS U/L  --  78   AST (SGOT) U/L  --  19   ALT (SGPT) U/L  --  21     Results from last 7 days   Lab Units 11/23/23 0425  "11/22/23  1129   CALCIUM mg/dL 9.0 8.9   ALBUMIN g/dL 4.4 4.7   MAGNESIUM mg/dL  --  2.2   PHOSPHORUS mg/dL 2.0*  --        No results found for: \"HGBA1C\", \"POCGLU\"    MRI Brain With & Without Contrast    Result Date: 11/22/2023   There is a large partially calcified dural-based mass with a broad base with respect to the left aspect of the falx cerebri medial to the left parietal and occipital lobes. The mass measures up to 7.6 x 5.8 x 8.2 cm in greatest dimensions and abuts the medial aspect of the superior portion of the left tentorial leaf with which it may have an attachment as well. The falx cerebri is bowed to the right and a small component along the right aspect of the falx cerebri medial to the right occipital lobe cannot be entirely excluded. There is marked mass effect upon the medial aspect of the left parietal and occipital lobes with marked sulcal effacement as well as ventricular effacement of the posterior aspect of the left lateral ventricle. There is shift of the midline structures to the right by approximately 8 mm. A small amount of vasogenic edema is seen within the left parietal and occipital lobes and this extends into the posterior aspect of the left temporal lobe. There is herniation of a small focus of the posterior and medial portion of the left temporal lobe through the tentorial incisura. Again, this lesion is most likely representative of a large meningioma.  This report was finalized on 11/22/2023 4:41 PM by Dr. Trenton Feliciano M.D on Workstation: [id number]      CT Head Without Contrast    Result Date: 11/22/2023  1. Large left parieto-occipital supratentorial hyperdense and partially calcified mass as discussed above. 2. This mass could conceivably represent a meningioma posterior arising from the tentorium or falx but further evaluation recommended with MRI of the brain with contrast. 3. Also correlation to any prior outside studies would be helpful. 4. Findings were discussed with the " ER physician.  Radiation dose reduction techniques were utilized, including automated exposure control and exposure modulation based on body size.        I have personally reviewed all medications:  Scheduled Medications  amLODIPine, 5 mg, Oral, Q24H  cefTRIAXone, 1,000 mg, Intravenous, Q24H  dexAMETHasone, 4 mg, Intravenous, Q6H  famotidine, 20 mg, Intravenous, Q12H  iopamidol, 95 mL, Intravenous, Once in imaging  levETIRAcetam, 500 mg, Oral, BID  losartan, 25 mg, Oral, Q24H    Infusions  niCARdipine, 5-15 mg/hr, Last Rate: 10 mg/hr (11/23/23 1519)  sodium chloride, 100 mL/hr    Diet  Diet: Cardiac Diets; Healthy Heart (2-3 Na+); Texture: Regular Texture (IDDSI 7); Fluid Consistency: Thin (IDDSI 0)  NPO Diet NPO Type: Sips with Meds    I have personally reviewed:  [x]  Laboratory   [x]  Microbiology   [x]  Radiology   [x]  EKG/Telemetry  [x]  Cardiology/Vascular   []  Pathology    [x]  Records    Assessment/Plan     Active Hospital Problems    Diagnosis  POA    **Hypertensive emergency [I16.1]  Yes    Acute cystitis without hematuria [N30.00]  Yes    Brain mass [G93.89]  Yes    Vision disturbance [H53.9]  Yes    Hypokalemia [E87.6]  Yes    Brain compression [G93.5]  Yes    Brain edema [G93.6]  Yes      Resolved Hospital Problems   No resolved problems to display.   Hypertensive Emergency  - had papilledema on admission  - BP still elevated, start on cardene drip   - continue norvasc, start on losartan  - echocardiogram noted with hyperdynamic LV, will give a timed course of gentle normal saline to hopefully prevent large fluctuations in blood pressure  - appreciate neurology recs    Brain Mass  - MRI brain noted with large left parietal-occipital mass  - has visual field deficits and RUE ataxia  - has surrounding edema, on decadron-famotidine for GI prophylaxis  - keppra started for seizure prophylaxis  - check CT c/a/p to look for evidence of primary lesion  - appreciate MALINDA recs, plans for craniotomy +/- prior  embolization depending on whether the tumor is primary or secondary-intervention planned early next week    Acute Cystitis without Hematuria  - reported dysuria, UA is suggestive  - send urine culture  - cover with ceftriaxone    SCDs for DVT prophylaxis.  Full code.  Discussed with patient, family, and nursing staff.  Anticipate discharge  TBD  timing yet to be determined.      Jose Daniel Cardenas MD  [facility]  11/23/23  16:19 EST      Electronically signed by Jose Daniel Cardenas MD at 11/23/23 1621          Consult Notes (last 72 hours)        Jill Kim, APRN at 11/22/23 1455        Consult Orders    1. Neurosurgery (on-call MD unless specified) [[id number]] ordered by Greg Person MD at 11/22/23 1353              Attestation signed by Kike Rodriguez MD at 11/24/23 1041    I have reviewed this documentation and agree.                  [facility] CONSULT NOTE    Patient name: Chato Potts  Referring Provider: Dr. Person  Reason for Consultation: brain mass    Patient Care Team:  Provider, No Known as PCP - General    Chief complaint: visual changes    Subjective .     History of present illness:    Patient is a 63 y.o. right handed male who presented to ER at the behest of his eye doctor today after findings of abnormal eye exam.  Patient went to the eye doctor for intermittent blurred vision for the last month.  He states the vision disturbance is bilateral but more so on the right than the left.  It last for a few seconds and will go away and recur intermittently.  It has become more frequent.  He denies any associated visual loss, double vision, headache, nausea, vomiting, imbalance.  He has not had any accidents or trauma.  No reported incontinence.  He denies any history of cancer.  Non-smoker.  No major medical issues.  He was found to be extremely hypertensive in the ER.    Review of Systems  Review of Systems   Constitutional:  Negative for unexpected weight  change.   HENT:  Negative for trouble swallowing.    Eyes:  Positive for visual disturbance.   Respiratory:  Negative for shortness of breath.    Cardiovascular:  Negative for chest pain.   Gastrointestinal:  Negative for nausea and vomiting.   Genitourinary:  Negative for enuresis.   Musculoskeletal:  Negative for gait problem.   Neurological:  Negative for weakness, numbness and headaches.   Psychiatric/Behavioral:  Negative for confusion.        History  PAST MEDICAL HISTORY  History reviewed. No pertinent past medical history.    PAST SURGICAL HISTORY  History reviewed. No pertinent surgical history.    FAMILY HISTORY  History reviewed. No pertinent family history.    SOCIAL HISTORY  Social History     Tobacco Use    Smoking status: Never    Smokeless tobacco: Never   Substance Use Topics    Alcohol use: Never    Drug use: Never     single  retired  Lives alone.  2 brothers live across the street from him    Allergies:  Patient has no known allergies.    MEDICATIONS:    Current Facility-Administered Medications:     dexAMETHasone (DECADRON) injection 4 mg, 4 mg, Intravenous, Q6H, Jill Kim APRN    famotidine (PEPCID) injection 20 mg, 20 mg, Intravenous, Q12H, Jill Kim APRN    iopamidol (ISOVUE-370) 76 % injection 95 mL, 95 mL, Intravenous, Once in imaging, Greg Person MD    levETIRAcetam (KEPPRA) tablet 500 mg, 500 mg, Oral, BID, Jill Kim APRN      Objective     Results Review:  LABS:  Results from last 7 days   Lab Units 11/22/23  1129   WBC 10*3/mm3 4.71   HEMOGLOBIN g/dL 14.8   HEMATOCRIT % 43.7   PLATELETS 10*3/mm3 175     Results from last 7 days   Lab Units 11/22/23  1129   SODIUM mmol/L 139   POTASSIUM mmol/L 3.3*   CHLORIDE mmol/L 105   CO2 mmol/L 23.0   BUN mg/dL 14   CREATININE mg/dL 1.03   CALCIUM mg/dL 8.9   BILIRUBIN mg/dL 0.8   ALK PHOS U/L 78   ALT (SGPT) U/L 21   AST (SGOT) U/L 19   GLUCOSE mg/dL 97     Results from last 7 days   Lab Units 11/22/23  1129   INR   0.94     Urinalysis trace blood, moderate leukocyte, TNTC WBC, 2+ bacteria    DIAGNOSTICS:  CTH- large left occipital mass with appearance of diffuse calcification. Mild surrounding edema. There is mass effect with mild MLS.    MRI brain-avidly enhancing large left parietal occipital mass with mild surrounding T2 flair changes consistent with edema.  There is localized mass effect with compression of the left lateral ventricle, occipital horn.  There is some deformity of the frontal horn of the left lateral ventricle as well.  There is mild midline shift    Results Review:   I reviewed the patient's new clinical results.  I personally viewed and interpreted the patient's CTH. Also reviewed by and d/w Dr. Rodriguez.    Vital Signs   Temp:  [97.9 °F (36.6 °C)] 97.9 °F (36.6 °C)  Heart Rate:  [] 77  Resp:  [16-19] 16  BP: (173-205)/(112-128) 173/112    Physical Exam:  Physical Exam  Vitals reviewed.   Constitutional:       Appearance: Normal appearance.   Pulmonary:      Effort: Pulmonary effort is normal.   Skin:     General: Skin is warm and dry.   Neurological:      General: No focal deficit present.      Mental Status: He is alert.      Motor: Motor strength is normal.     Coordination: Finger-Nose-Finger Test abnormal (Right mild ataxia and past-pointing).   Psychiatric:         Mood and Affect: Mood normal.         Speech: Speech normal.         Thought Content: Thought content normal.       Neurologic Exam     Mental Status   Follows 3 step commands.   Attention: normal. Concentration: normal.   Speech: speech is normal   Level of consciousness: alert  Knowledge: good.   Normal comprehension.     Cranial Nerves     CN II   Right visual field deficit: upper temporal and lower temporal quadrant(s)  Left visual field deficit: upper nasal and lower nasal quadrant(s)    CN III, IV, VI   Right pupil: Size: 3 mm.   Left pupil: Size: 2 mm.   CN III: right CN III palsy  CN VI: no CN VI palsy  Nystagmus: none    Diplopia: none    CN V   Facial sensation intact.     CN VII   Facial expression full, symmetric.     CN VIII   CN VIII normal.     CN IX, X   CN IX normal.   CN X normal.     CN XI   CN XI normal.     CN XII   CN XII normal.     Motor Exam   Right arm pronator drift: absent  Left arm pronator drift: absent    Strength   Strength 5/5 throughout.     Sensory Exam   Light touch normal.     Gait, Coordination, and Reflexes     Gait  Gait: (Not tested due to workup for brain tumor)    Coordination   Finger to nose coordination: abnormal (Right mild ataxia and past-pointing)    Tremor   Resting tremor: absent  Intention tremor: present (Right upper extremity)  Action tremor: absent      Assessment & Plan       Hypertensive emergency    Brain mass    Vision disturbance    Hypokalemia    Patient with blurred vision x 1 month presented to ophthalmology office and there was concern about abnormal eye exam and he was sent to the ER.  He denies headache, nausea, vomiting.  CT head showed large left occipital mass.  On exam patient has right homonymous hemianopsia, partial cranial nerve III palsy, mild right upper extremity ataxia.  MRI brain completed showing avidly enhancing left parietal occipital mass with midline shift and localized mass effect on the left lateral ventricle, occipital horn there is no hydrocephalus.  There is minimal surrounding edema..  Overall imaging looks like a possible meningioma although a choroid plexus papilloma is a possibility or a metastatic lesion.  Will obtain systemic workup with CT chest abdomen pelvis.  Will need intracranial surgery to remove mass but if it appears to be a primary brain lesion, may need embolization of the tumor prior to craniotomy.  If felt to be a metastatic lesion, will likely not need precrani embolization.  Will make n.p.o. after midnight for Monday 11/27 in the event surgical intervention can be arranged.  His hypertension will need to be managed by hospitalist  "service.  He will need medical clearance for surgical intervention.    PLAN:   IV steroid + GI prophylaxis  Keppra  CT C/A/P  Possible surgical intervention early next week.  May need precrani embolization if a primary tumor is suspected based on systemic workup  N.p.o. midnight 11/27  Primary service to evaluate for surgical clearance    I discussed the patient's findings and my recommendations with patient and Dr. Rodriguez    During patient visit, I utilized appropriate personal protective equipment including gloves. Appropriate PPE was worn during the entire visit.  Hand hygiene was completed before and after.     Jill Kim, APRN  11/22/23  17:06 EST    \"Dictated utilizing Dragon dictation\".        Electronically signed by Kike Rodriguez MD at 11/24/23 1041       Pierre Rondon MD at 11/22/23 1138        Consult Orders    1. Inpatient Neurology Consult Stroke [[id number]] ordered by Jeremias Anglin MD at 11/22/23 1115                 Neurology Consult Note    Consult Date: 11/22/2023    Referring MD: No ref. provider found    Reason for Consult I have been asked to see the patient in neurological consultation to render advice and opinion regarding blurry vision in both eyes    Chato Potts is a 63 y.o. male with no past medical history and does not take any medications.  Patient has been having intermittent blurry vision in both eyes for the past several weeks plan for an ophthalmology evaluation and was found to have bilateral papilledema.  He was told to go to the ER for further evaluation of his vision problems.  He states at present he does not have any blurry vision but he has episodic blurry vision in both eyes.     He denies any headache, weakness numbness or speech problems  Never had a stroke or a TIA  He never had blood clots in his legs or chest    History reviewed. No pertinent past medical history.    Exam  BP (!) 205/128 (Patient Position: Lying)   Pulse 109   Temp 97.9 " "°F (36.6 °C) (Tympanic)   Resp 19   Ht 188 cm (74\")   Wt 102 kg (225 lb)   SpO2 97%   BMI 28.89 kg/m²   Gen: NAD, vitals reviewed  MS: oriented x3, recent/remote memory intact, normal attention/concentration, language intact, no neglect.  CN: Right eye superior nasal quadrant decreased visual acuity and left eye nasal quadrants decreased visual acuity, PERRL, EOMI, no facial droop, no dysarthria  Motor: 5/5 throughout upper and lower extremities, normal tone    DATA:    No results found for: \"GLUCOSE\", \"CALCIUM\", \"NA\", \"K\", \"CO2\", \"CL\", \"BUN\", \"CREATININE\", \"EGFRIFAFRI\", \"EGFRIFNONA\", \"BCR\", \"ANIONGAP\"  No results found for: \"WBC\", \"HGB\", \"HCT\", \"MCV\", \"PLT\"    Lab review:   Sodium 139  Creatinine 1.03  Normal LFTs    TSH 3.96    WBC 4.71  Hemoglobin 14.8 and platelets 175      Imaging review:     MRI brain-large partially calcified dural based mass in the left parietal and occipital lobes    Diagnoses:  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    His blood pressure also seems to be elevated and he was complaining of burning micturition and had a urine analysis which was positive on admission.    Plan  -Systolic blood pressure goal less than 150  -Neurosurgery has been consulted and started on Keppra prophylaxis for 500 mg twice daily and dexamethasone 4 mg every 6  -CT abdomen pelvis with chest with and without contrast has been ordered.      No further workup from neurology will sign off, kindly let us know if any further questions      MDM   Reviewed: " Previous charts, nursing notes and vitals   Reviewed: Previous labs and CT scan and MRI scan   Interpretation: Labs and CT scan and MRI scan  Total time providing care is :30-74 minutes. This excluded time spent performing separately reportable procedures and services  Consults :Neurology/Stroke    Please note that portions of this note were completed with a voice recognition program.     Pierre Rondon MD  Neuro Hospitalist /Vascular Neurology.               Electronically signed by Pierre Rondon MD at 11/23/23 8369

## 2023-11-25 LAB
ANION GAP SERPL CALCULATED.3IONS-SCNC: 12.4 MMOL/L (ref 5–15)
BASOPHILS # BLD AUTO: 0.01 10*3/MM3 (ref 0–0.2)
BASOPHILS NFR BLD AUTO: 0.1 % (ref 0–1.5)
BUN SERPL-MCNC: 17 MG/DL (ref 8–23)
BUN/CREAT SERPL: 21.5 (ref 7–25)
CALCIUM SPEC-SCNC: 8.7 MG/DL (ref 8.6–10.5)
CHLORIDE SERPL-SCNC: 109 MMOL/L (ref 98–107)
CO2 SERPL-SCNC: 18.6 MMOL/L (ref 22–29)
CREAT SERPL-MCNC: 0.79 MG/DL (ref 0.76–1.27)
DEPRECATED RDW RBC AUTO: 43.8 FL (ref 37–54)
EGFRCR SERPLBLD CKD-EPI 2021: 100.4 ML/MIN/1.73
EOSINOPHIL # BLD AUTO: 0 10*3/MM3 (ref 0–0.4)
EOSINOPHIL NFR BLD AUTO: 0 % (ref 0.3–6.2)
ERYTHROCYTE [DISTWIDTH] IN BLOOD BY AUTOMATED COUNT: 13.5 % (ref 12.3–15.4)
GLUCOSE SERPL-MCNC: 147 MG/DL (ref 65–99)
HBA1C MFR BLD: 5.5 % (ref 4.8–5.6)
HCT VFR BLD AUTO: 45.2 % (ref 37.5–51)
HGB BLD-MCNC: 15.5 G/DL (ref 13–17.7)
IMM GRANULOCYTES # BLD AUTO: 0.15 10*3/MM3 (ref 0–0.05)
IMM GRANULOCYTES NFR BLD AUTO: 1 % (ref 0–0.5)
LYMPHOCYTES # BLD AUTO: 0.79 10*3/MM3 (ref 0.7–3.1)
LYMPHOCYTES NFR BLD AUTO: 5.3 % (ref 19.6–45.3)
MCH RBC QN AUTO: 30.1 PG (ref 26.6–33)
MCHC RBC AUTO-ENTMCNC: 34.3 G/DL (ref 31.5–35.7)
MCV RBC AUTO: 87.8 FL (ref 79–97)
MONOCYTES # BLD AUTO: 0.44 10*3/MM3 (ref 0.1–0.9)
MONOCYTES NFR BLD AUTO: 2.9 % (ref 5–12)
NEUTROPHILS NFR BLD AUTO: 13.55 10*3/MM3 (ref 1.7–7)
NEUTROPHILS NFR BLD AUTO: 90.7 % (ref 42.7–76)
NRBC BLD AUTO-RTO: 0 /100 WBC (ref 0–0.2)
PLATELET # BLD AUTO: 216 10*3/MM3 (ref 140–450)
PMV BLD AUTO: 10 FL (ref 6–12)
POTASSIUM SERPL-SCNC: 3.7 MMOL/L (ref 3.5–5.2)
RBC # BLD AUTO: 5.15 10*6/MM3 (ref 4.14–5.8)
SODIUM SERPL-SCNC: 140 MMOL/L (ref 136–145)
WBC NRBC COR # BLD AUTO: 14.94 10*3/MM3 (ref 3.4–10.8)

## 2023-11-25 PROCEDURE — 25010000002 DEXAMETHASONE PER 1 MG: Performed by: NURSE PRACTITIONER

## 2023-11-25 PROCEDURE — 80048 BASIC METABOLIC PNL TOTAL CA: CPT | Performed by: INTERNAL MEDICINE

## 2023-11-25 PROCEDURE — 99222 1ST HOSP IP/OBS MODERATE 55: CPT | Performed by: INTERNAL MEDICINE

## 2023-11-25 PROCEDURE — 25810000003 SODIUM CHLORIDE 0.9 % SOLUTION: Performed by: INTERNAL MEDICINE

## 2023-11-25 PROCEDURE — 83036 HEMOGLOBIN GLYCOSYLATED A1C: CPT | Performed by: STUDENT IN AN ORGANIZED HEALTH CARE EDUCATION/TRAINING PROGRAM

## 2023-11-25 PROCEDURE — 85025 COMPLETE CBC W/AUTO DIFF WBC: CPT | Performed by: INTERNAL MEDICINE

## 2023-11-25 PROCEDURE — 25010000002 CEFTRIAXONE PER 250 MG: Performed by: INTERNAL MEDICINE

## 2023-11-25 RX ORDER — AMLODIPINE BESYLATE 5 MG/1
5 TABLET ORAL ONCE
Qty: 1 TABLET | Refills: 0 | Status: COMPLETED | OUTPATIENT
Start: 2023-11-25 | End: 2023-11-25

## 2023-11-25 RX ORDER — SPIRONOLACTONE 25 MG/1
25 TABLET ORAL DAILY
Status: DISCONTINUED | OUTPATIENT
Start: 2023-11-26 | End: 2023-11-27 | Stop reason: HOSPADM

## 2023-11-25 RX ORDER — AMLODIPINE BESYLATE 10 MG/1
10 TABLET ORAL
Status: DISCONTINUED | OUTPATIENT
Start: 2023-11-26 | End: 2023-11-27 | Stop reason: HOSPADM

## 2023-11-25 RX ORDER — LOSARTAN POTASSIUM 50 MG/1
50 TABLET ORAL
Status: DISCONTINUED | OUTPATIENT
Start: 2023-11-26 | End: 2023-11-27 | Stop reason: HOSPADM

## 2023-11-25 RX ADMIN — NICARDIPINE HYDROCHLORIDE 7.5 MG/HR: 25 INJECTION, SOLUTION INTRAVENOUS at 12:35

## 2023-11-25 RX ADMIN — AMLODIPINE BESYLATE 5 MG: 5 TABLET ORAL at 08:41

## 2023-11-25 RX ADMIN — CEFTRIAXONE SODIUM 1000 MG: 1 INJECTION, POWDER, FOR SOLUTION INTRAMUSCULAR; INTRAVENOUS at 10:15

## 2023-11-25 RX ADMIN — NICARDIPINE HYDROCHLORIDE 5 MG/HR: 25 INJECTION, SOLUTION INTRAVENOUS at 05:57

## 2023-11-25 RX ADMIN — NICARDIPINE HYDROCHLORIDE 5 MG/HR: 25 INJECTION, SOLUTION INTRAVENOUS at 00:10

## 2023-11-25 RX ADMIN — NICARDIPINE HYDROCHLORIDE 5 MG/HR: 25 INJECTION, SOLUTION INTRAVENOUS at 23:18

## 2023-11-25 RX ADMIN — LEVETIRACETAM 500 MG: 500 TABLET, FILM COATED ORAL at 08:41

## 2023-11-25 RX ADMIN — NICARDIPINE HYDROCHLORIDE 10 MG/HR: 25 INJECTION, SOLUTION INTRAVENOUS at 17:27

## 2023-11-25 RX ADMIN — DEXAMETHASONE SODIUM PHOSPHATE 4 MG: 4 INJECTION, SOLUTION INTRA-ARTICULAR; INTRALESIONAL; INTRAMUSCULAR; INTRAVENOUS; SOFT TISSUE at 05:57

## 2023-11-25 RX ADMIN — DEXAMETHASONE SODIUM PHOSPHATE 4 MG: 4 INJECTION, SOLUTION INTRA-ARTICULAR; INTRALESIONAL; INTRAMUSCULAR; INTRAVENOUS; SOFT TISSUE at 11:09

## 2023-11-25 RX ADMIN — DEXAMETHASONE SODIUM PHOSPHATE 4 MG: 4 INJECTION, SOLUTION INTRA-ARTICULAR; INTRALESIONAL; INTRAMUSCULAR; INTRAVENOUS; SOFT TISSUE at 00:08

## 2023-11-25 RX ADMIN — DEXAMETHASONE SODIUM PHOSPHATE 4 MG: 4 INJECTION, SOLUTION INTRA-ARTICULAR; INTRALESIONAL; INTRAMUSCULAR; INTRAVENOUS; SOFT TISSUE at 17:33

## 2023-11-25 RX ADMIN — NICARDIPINE HYDROCHLORIDE 10 MG/HR: 25 INJECTION, SOLUTION INTRAVENOUS at 20:20

## 2023-11-25 RX ADMIN — LOSARTAN POTASSIUM 25 MG: 25 TABLET, FILM COATED ORAL at 08:41

## 2023-11-25 RX ADMIN — LEVETIRACETAM 500 MG: 500 TABLET, FILM COATED ORAL at 20:31

## 2023-11-25 RX ADMIN — FAMOTIDINE 20 MG: 10 INJECTION INTRAVENOUS at 20:33

## 2023-11-25 RX ADMIN — DEXAMETHASONE SODIUM PHOSPHATE 4 MG: 4 INJECTION, SOLUTION INTRA-ARTICULAR; INTRALESIONAL; INTRAMUSCULAR; INTRAVENOUS; SOFT TISSUE at 23:18

## 2023-11-25 RX ADMIN — FAMOTIDINE 20 MG: 10 INJECTION INTRAVENOUS at 08:41

## 2023-11-25 RX ADMIN — NICARDIPINE HYDROCHLORIDE 10 MG/HR: 25 INJECTION, SOLUTION INTRAVENOUS at 15:29

## 2023-11-25 RX ADMIN — AMLODIPINE BESYLATE 5 MG: 5 TABLET ORAL at 15:29

## 2023-11-25 NOTE — PLAN OF CARE
Goal Outcome Evaluation:      Pt alert and oriented. On RA. NSR on tele. Pt on Cardene gtt w/ rate 5 mg/hr. No acute distress noted. Plan of care ongoing.

## 2023-11-25 NOTE — PLAN OF CARE
Goal Outcome Evaluation:  Plan of Care Reviewed With: patient        Progress: improving       Patient is Aox4. B/P has been between 160/150s. He stated that his blurred vision is a lot better. He denies having a headache. Cardene is currently titrated to 10mg. Cardiology was consulted and came by to see the patient. Patient will be NPO at midnight 11/26/23 for surgery.

## 2023-11-25 NOTE — PROGRESS NOTES
Name: Chato Potts ADMIT: 2023   : 1960  PCP: Provider, No Known    MRN: [id number] LOS: 3 days   AGE/SEX: 63 y.o. male  ROOM: [facility]     Subjective   Subjective   Patient seen and examined this morning. Hospital day 3. At time of my examination, patient is awake, alert and resting in bed. Visual disturbances improved, patient without additional acute complaints at present. No acute events overnight.      Objective   Objective   Vital Signs  Temp:  [97.7 °F (36.5 °C)-98.1 °F (36.7 °C)] 97.7 °F (36.5 °C)  Heart Rate:  [] 96  Resp:  [16-18] 16  BP: (140-173)/() 173/89  SpO2:  [91 %-97 %] 97 %  on   ;   Device (Oxygen Therapy): room air  Body mass index is 28.75 kg/m².  Physical Exam  Vitals and nursing note reviewed.   Constitutional:       General: He is awake. He is not in acute distress.     Appearance: He is overweight.   Eyes:      General: No scleral icterus.     Conjunctiva/sclera: Conjunctivae normal.   Cardiovascular:      Rate and Rhythm: Normal rate and regular rhythm.      Pulses: Normal pulses.      Heart sounds: Normal heart sounds.   Pulmonary:      Effort: Pulmonary effort is normal. No respiratory distress.      Breath sounds: Normal breath sounds. No wheezing.   Abdominal:      General: Bowel sounds are normal.      Palpations: Abdomen is soft.      Tenderness: There is no abdominal tenderness.   Skin:     General: Skin is warm and dry.   Neurological:      General: No focal deficit present.      Mental Status: He is alert.   Psychiatric:         Behavior: Behavior is cooperative.       Results Review     I reviewed the patient's new clinical results.  Results from last 7 days   Lab Units 23  0510 23  0535 23  1129   WBC 10*3/mm3 14.94* 12.74* 4.71   HEMOGLOBIN g/dL 15.5 14.2 14.8   PLATELETS 10*3/mm3 216 199 175     Results from last 7 days   Lab Units 23  0511 23  0535 23  0425 23  1129   SODIUM mmol/L 140 140 141 139    POTASSIUM mmol/L 3.7 3.6 4.2 3.3*   CHLORIDE mmol/L 109* 109* 108* 105   CO2 mmol/L 18.6* 20.0* 20.9* 23.0   BUN mg/dL 17 17 13 14   CREATININE mg/dL 0.79 0.75* 0.99 1.03   GLUCOSE mg/dL 147* 147* 156* 97   EGFR mL/min/1.73 100.4 102.0 86.1 82.1     Results from last 7 days   Lab Units 11/23/23  0425 11/22/23  1129   ALBUMIN g/dL 4.4 4.7   BILIRUBIN mg/dL  --  0.8   ALK PHOS U/L  --  78   AST (SGOT) U/L  --  19   ALT (SGPT) U/L  --  21     Results from last 7 days   Lab Units 11/25/23  0511 11/24/23  0535 11/23/23  0425 11/22/23  1129   CALCIUM mg/dL 8.7 8.6 9.0 8.9   ALBUMIN g/dL  --   --  4.4 4.7   MAGNESIUM mg/dL  --   --   --  2.2   PHOSPHORUS mg/dL  --   --  2.0*  --        Hemoglobin A1C   Date/Time Value Ref Range Status   11/25/2023 0510 5.50 4.80 - 5.60 % Final       No radiology results for the last day    I have personally reviewed all medications:  Scheduled Medications  amLODIPine, 5 mg, Oral, Q24H  cefTRIAXone, 1,000 mg, Intravenous, Q24H  dexAMETHasone, 4 mg, Intravenous, Q6H  famotidine, 20 mg, Intravenous, Q12H  levETIRAcetam, 500 mg, Oral, BID  losartan, 25 mg, Oral, Q24H    Infusions  niCARdipine, 5-15 mg/hr, Last Rate: 7.5 mg/hr (11/25/23 1235)    Diet  Diet: Cardiac Diets; Healthy Heart (2-3 Na+); Texture: Regular Texture (IDDSI 7); Fluid Consistency: Thin (IDDSI 0)  NPO Diet NPO Type: Sips with Meds    I have personally reviewed:  [x]  Laboratory   [x]  Microbiology   [x]  Radiology   [x]  EKG/Telemetry  [x]  Cardiology/Vascular   []  Pathology    []  Records       Assessment/Plan     Active Hospital Problems    Diagnosis  POA    **Hypertensive emergency [I16.1]  Yes    Acute cystitis without hematuria [N30.00]  Yes    Brain mass [G93.89]  Yes    Vision disturbance [H53.9]  Yes    Hypokalemia [E87.6]  Yes    Brain compression [G93.5]  Yes    Brain edema [G93.6]  Yes      Resolved Hospital Problems   No resolved problems to display.       63 y.o. male admitted with Hypertensive  "emergency.    Hypertensive Emergency  - Patient noted to have SBP >200 and DBP >120 on arrival, coupled with papilledema. Findings consistent with hypertensive emergency. 2D Echo (11/23/23) showing Left ventricular systolic function is hyperdynamic (EF > 70%), LVOT pressure gradient 17 at rest and 32 with provocation, likely a result of hyperdynamic left ventricle. Also noting mild to moderate aortic regurgitation.  - He remains on Cardene drip at this time, in addition to Norvasc and Losartan. Blood pressure remains elevated above goal on 160, have been unable turn drip off.  - Increase Amlodipine to 10 mg daily now (will give additional 5 mg to account for today). Increase Losartan to 50 mg daily.Continue Cardene drip, wean as tolerated.  - Spoke to Dr. Robles, about case, he is agreeable to consult and follow. Order placed, he will see patient in AM on 11/26.  - Appreciate neurology recs.     Brain Mass  - MRI brain obtained and noted \"large partially calcified dural-based mass with a broad base with respect to the left aspect of the falx cerebri medial to the left   parietal and occipital lobes\" with mass effect upon the medial aspect of the left parietal and occipital lobes with marked sulcal effacement and ventricular effacement of the posterior aspect of the lateral left ventricle, covered with 8 mm of midline shift to the right and small amount of vasogenic edema.  - Imaging findings coupled with patient's endorsement of visual field deficits and RUE ataxia.  - Neurosurgery consulted and following. He is on Decadron at present,  appreciate MALINDA recs, plans for craniotomy +/- prior embolization depending on whether the tumor is primary or secondary-intervention planned early next week.  - CT C/A/P obtained, however, no evidence of findings concerning for malignant process.  - Keppra for seizure prophylaxis, Famotidine for GI prophylaxis.  - Closely monitor, fall/aspiration precautions, PT/OT, follow up " Neurosurgery plans and recommendations.    Acute cystitis without hematuria  - Patient presenting with symptoms of dysuria coupled with UA findings showing 2+ leukocyte esterase, TNTC WBC, 2+ bacteria.  He is currently on ceftriaxone, urine culture showing >100K GNB, final speciation/sensitivities pending.   - Continue empiric ceftriaxone as prescribed for now while awaiting results of further workup.  - Acute urinary retention protocol.    Leukocytosis  - WBC count elevated on most recent labs. Etiology of WBC elevation likely a result of infection + corticosteroids.   - Order repeat CBC in AM for reassessment.      Hyperglycemia  - Glucose elevated on most recent labs. Patient without known history of T2DM.  Etiology likely a result of corticosteroids. Hemoglobin A1c 5.50% (11/25/23).   - Monitor for now, continue with POC glucose checks, can add correctional SSI if needed.    Hypokalemia, resolved  - Potassium noted to be low previously, repleted per electrolyte protocol and improved on most recent testing.      SCDs for DVT prophylaxis.  Full code.  Discussed with patient, family, and nursing staff.  Anticipate discharge  TBD  timing yet to be determined.      Mikel Leonard DO  [facility]  11/25/23

## 2023-11-26 LAB
ANION GAP SERPL CALCULATED.3IONS-SCNC: 11 MMOL/L (ref 5–15)
BACTERIA SPEC AEROBE CULT: ABNORMAL
BASOPHILS # BLD AUTO: 0.01 10*3/MM3 (ref 0–0.2)
BASOPHILS NFR BLD AUTO: 0.1 % (ref 0–1.5)
BUN SERPL-MCNC: 21 MG/DL (ref 8–23)
BUN/CREAT SERPL: 26.9 (ref 7–25)
CALCIUM SPEC-SCNC: 8.4 MG/DL (ref 8.6–10.5)
CHLORIDE SERPL-SCNC: 108 MMOL/L (ref 98–107)
CO2 SERPL-SCNC: 22 MMOL/L (ref 22–29)
CREAT SERPL-MCNC: 0.78 MG/DL (ref 0.76–1.27)
DEPRECATED RDW RBC AUTO: 40.4 FL (ref 37–54)
EGFRCR SERPLBLD CKD-EPI 2021: 100.8 ML/MIN/1.73
EOSINOPHIL # BLD AUTO: 0 10*3/MM3 (ref 0–0.4)
EOSINOPHIL NFR BLD AUTO: 0 % (ref 0.3–6.2)
ERYTHROCYTE [DISTWIDTH] IN BLOOD BY AUTOMATED COUNT: 13.1 % (ref 12.3–15.4)
GLUCOSE SERPL-MCNC: 152 MG/DL (ref 65–99)
HCT VFR BLD AUTO: 43.4 % (ref 37.5–51)
HGB BLD-MCNC: 14.4 G/DL (ref 13–17.7)
IMM GRANULOCYTES # BLD AUTO: 0.18 10*3/MM3 (ref 0–0.05)
IMM GRANULOCYTES NFR BLD AUTO: 1.5 % (ref 0–0.5)
LYMPHOCYTES # BLD AUTO: 0.55 10*3/MM3 (ref 0.7–3.1)
LYMPHOCYTES NFR BLD AUTO: 4.5 % (ref 19.6–45.3)
MCH RBC QN AUTO: 28.2 PG (ref 26.6–33)
MCHC RBC AUTO-ENTMCNC: 33.2 G/DL (ref 31.5–35.7)
MCV RBC AUTO: 85.1 FL (ref 79–97)
MONOCYTES # BLD AUTO: 0.53 10*3/MM3 (ref 0.1–0.9)
MONOCYTES NFR BLD AUTO: 4.3 % (ref 5–12)
NEUTROPHILS NFR BLD AUTO: 10.96 10*3/MM3 (ref 1.7–7)
NEUTROPHILS NFR BLD AUTO: 89.6 % (ref 42.7–76)
NRBC BLD AUTO-RTO: 0 /100 WBC (ref 0–0.2)
PLATELET # BLD AUTO: 218 10*3/MM3 (ref 140–450)
PMV BLD AUTO: 9.9 FL (ref 6–12)
POTASSIUM SERPL-SCNC: 3.8 MMOL/L (ref 3.5–5.2)
RBC # BLD AUTO: 5.1 10*6/MM3 (ref 4.14–5.8)
SODIUM SERPL-SCNC: 141 MMOL/L (ref 136–145)
WBC NRBC COR # BLD AUTO: 12.23 10*3/MM3 (ref 3.4–10.8)

## 2023-11-26 PROCEDURE — 85025 COMPLETE CBC W/AUTO DIFF WBC: CPT | Performed by: INTERNAL MEDICINE

## 2023-11-26 PROCEDURE — 25010000002 DEXAMETHASONE PER 1 MG: Performed by: NURSE PRACTITIONER

## 2023-11-26 PROCEDURE — 25810000003 SODIUM CHLORIDE 0.9 % SOLUTION: Performed by: INTERNAL MEDICINE

## 2023-11-26 PROCEDURE — 25010000002 CEFTRIAXONE PER 250 MG: Performed by: INTERNAL MEDICINE

## 2023-11-26 PROCEDURE — 80048 BASIC METABOLIC PNL TOTAL CA: CPT | Performed by: INTERNAL MEDICINE

## 2023-11-26 PROCEDURE — 99232 SBSQ HOSP IP/OBS MODERATE 35: CPT | Performed by: NURSE PRACTITIONER

## 2023-11-26 RX ORDER — METOPROLOL SUCCINATE 25 MG/1
25 TABLET, EXTENDED RELEASE ORAL
Status: DISCONTINUED | OUTPATIENT
Start: 2023-11-26 | End: 2023-11-27 | Stop reason: HOSPADM

## 2023-11-26 RX ADMIN — NICARDIPINE HYDROCHLORIDE 5 MG/HR: 25 INJECTION, SOLUTION INTRAVENOUS at 11:06

## 2023-11-26 RX ADMIN — AMLODIPINE BESYLATE 10 MG: 10 TABLET ORAL at 08:15

## 2023-11-26 RX ADMIN — DEXAMETHASONE SODIUM PHOSPHATE 4 MG: 4 INJECTION, SOLUTION INTRA-ARTICULAR; INTRALESIONAL; INTRAMUSCULAR; INTRAVENOUS; SOFT TISSUE at 11:06

## 2023-11-26 RX ADMIN — LOSARTAN POTASSIUM 50 MG: 50 TABLET, FILM COATED ORAL at 08:15

## 2023-11-26 RX ADMIN — NICARDIPINE HYDROCHLORIDE 10 MG/HR: 25 INJECTION, SOLUTION INTRAVENOUS at 16:04

## 2023-11-26 RX ADMIN — NICARDIPINE HYDROCHLORIDE 5 MG/HR: 25 INJECTION, SOLUTION INTRAVENOUS at 04:55

## 2023-11-26 RX ADMIN — NICARDIPINE HYDROCHLORIDE 10 MG/HR: 25 INJECTION, SOLUTION INTRAVENOUS at 19:23

## 2023-11-26 RX ADMIN — DEXAMETHASONE SODIUM PHOSPHATE 4 MG: 4 INJECTION, SOLUTION INTRA-ARTICULAR; INTRALESIONAL; INTRAMUSCULAR; INTRAVENOUS; SOFT TISSUE at 17:35

## 2023-11-26 RX ADMIN — DEXAMETHASONE SODIUM PHOSPHATE 4 MG: 4 INJECTION, SOLUTION INTRA-ARTICULAR; INTRALESIONAL; INTRAMUSCULAR; INTRAVENOUS; SOFT TISSUE at 05:23

## 2023-11-26 RX ADMIN — CEFTRIAXONE SODIUM 1000 MG: 1 INJECTION, POWDER, FOR SOLUTION INTRAMUSCULAR; INTRAVENOUS at 11:05

## 2023-11-26 RX ADMIN — SPIRONOLACTONE 25 MG: 25 TABLET, FILM COATED ORAL at 08:15

## 2023-11-26 RX ADMIN — FAMOTIDINE 20 MG: 10 INJECTION INTRAVENOUS at 08:15

## 2023-11-26 RX ADMIN — METOPROLOL SUCCINATE 25 MG: 25 TABLET, EXTENDED RELEASE ORAL at 17:35

## 2023-11-26 RX ADMIN — DEXAMETHASONE SODIUM PHOSPHATE 4 MG: 4 INJECTION, SOLUTION INTRA-ARTICULAR; INTRALESIONAL; INTRAMUSCULAR; INTRAVENOUS; SOFT TISSUE at 21:24

## 2023-11-26 RX ADMIN — LEVETIRACETAM 500 MG: 500 TABLET, FILM COATED ORAL at 21:24

## 2023-11-26 RX ADMIN — FAMOTIDINE 20 MG: 10 INJECTION INTRAVENOUS at 21:24

## 2023-11-26 RX ADMIN — NICARDIPINE HYDROCHLORIDE 10 MG/HR: 25 INJECTION, SOLUTION INTRAVENOUS at 21:35

## 2023-11-26 RX ADMIN — LEVETIRACETAM 500 MG: 500 TABLET, FILM COATED ORAL at 08:15

## 2023-11-26 NOTE — PROGRESS NOTES
[facility], [place] NEUROSURGERY INTRACRANIAL PROGRESS NOTE    PATIENT IDENTIFICATION:   Name:  Chato Potts      MRN:  [id number]     63 y.o.  male               CC:brain mass       Subjective     Interval History:  Denies blurred vision. Denies headache.     ROS:  Constitutional: No fever, chills  HEENT: No headache, no vision changes, no tinnitus, no hearing difficulties  Neck: no stiffness  GI: No nausea, vomiting, no swallow difficulties  Neuro: No numbness, tingling, or weakness, no speech difficulties, no balance difficulties    Objective     Vital signs in last 24 hours:  Temp:  [97.8 °F (36.6 °C)-98.2 °F (36.8 °C)] 98.2 °F (36.8 °C)  Heart Rate:  [] 111  Resp:  [16-18] 18  BP: (131-166)/(74-97) 140/92      Intake/Output this shift:  I/O this shift:  In: -   Out: 550 [Urine:550]      Intake/Output last 3 shifts:  I/O last 3 completed shifts:  In: 240 [P.O.:240]  Out: 2800 [Urine:2800]    LABS:  Results from last 7 days   Lab Units 11/26/23  0447 11/25/23  0510 11/24/23  0535   WBC 10*3/mm3 12.23* 14.94* 12.74*   HEMOGLOBIN g/dL 14.4 15.5 14.2   HEMATOCRIT % 43.4 45.2 42.6   PLATELETS 10*3/mm3 218 216 199     Results from last 7 days   Lab Units 11/26/23  0447 11/25/23  0511 11/24/23  0535 11/23/23  0425 11/22/23  1129   SODIUM mmol/L 141 140 140   < > 139   POTASSIUM mmol/L 3.8 3.7 3.6   < > 3.3*   CHLORIDE mmol/L 108* 109* 109*   < > 105   CO2 mmol/L 22.0 18.6* 20.0*   < > 23.0   BUN mg/dL 21 17 17   < > 14   CREATININE mg/dL 0.78 0.79 0.75*   < > 1.03   CALCIUM mg/dL 8.4* 8.7 8.6   < > 8.9   BILIRUBIN mg/dL  --   --   --   --  0.8   ALK PHOS U/L  --   --   --   --  78   ALT (SGPT) U/L  --   --   --   --  21   AST (SGOT) U/L  --   --   --   --  19   GLUCOSE mg/dL 152* 147* 147*   < > 97    < > = values in this interval not displayed.        IMAGING STUDIES:  11/22/23 MRI Brain  Per MRI, There is a large calcified dural based mass left aspect of medial to left parietal and occipital lobes. The mass measures  7.6x5.8x8.2 cm and abuts the medial aspect of the superior portion of the left tentorial leaf. There is marked mass effect upon the medial aspect of the left parietal and occipital lobes with marked sulcal effacement as well as ventricular effacement of the posterior aspect of the left lateral ventricle. There is a shift of the midline to the right by approximately 8 mm. A small amount of vasogenic edema is seen within the left parietal and occipital lobes and this extends to the left temporal lobe. This lesion is likely representative of a large meningioma.     I personally viewed and interpreted the patient's chart.   Meds reviewed/changed: Yes  Rocephin 1000mg IV daily x 5 days  Norvasc 10mg daily   Decadron 4mg IV every 6 hours  Pepcid 20mg BID  Keppra 500mg BID  Cozaar 50mg daily   Aldactone 25mg daily   Cardene IV     Physical Exam:    General:   Awake, alert, oriented x3. Speech clear with no aphasia  Neck:    supple  CN II-XII:   Right homonymous hemianopsia   Motor:    Moving all extremities. No drift   Sensation:   Normal to light touch; no extinction  Station and Gait:  Up ad k[place]  Coordination:  Finger-to-nose and heel-to-shin test shows no dysmetria.        Assessment & Plan     ASSESSMENT:  Patient has large left occipital brain mass and right homonymous hemianopsia. Has been cleared per cardiology for surgery.     Is currently being treated for acute cystitis and is on IV Rocephin. He continues on IV Cardene for blood pressure.     He is NPO midnight for possible craniotomy +/- precrani embolization on Monday.       Hypertensive emergency    Brain mass    Vision disturbance    Hypokalemia    Brain compression    Brain edema    Acute cystitis without hematuria      PLAN:   Continue steroid and Keppra  Cardiology has cleared for surgery   Definitive surgical plans still pending     I discussed the patient's findings and my recommendations with patient and Dr. Oedll.       LOS: 4 days       Esthela GARCIA  JACQUELINE Randhawa  11/26/2023  12:40 EST

## 2023-11-26 NOTE — CONSULTS
Date of Consultation: 23    Referral Provider: Dr. Leonard     Reason for Consultation: Hypertension.    Encounter Provider: Amol Robles MD    Group of Service: [facility]     Patient Name: Chato Potts    :1960    Chief complaint: Blurred vision.    History of Present Illness:      This is a very pleasant 63 year-old male with a history of hypertension.  The patient presented to the ER on 2023 with blurry vision intermittently for approximately 3 to 4 weeks.  He had been diagnosed with hypertension before this, but had not had any effects.  He states that his blood pressure rarely runs below 170 at baseline but he had never felt poorly previously.    Unfortunately, he was found to have a large brain mass on imaging.  An MRI of the brain confirmed this medial to the left parietal and occipital lobes, and likely represents a large meningioma.  There are plans for potential craniotomy on 2023  As there is concern about compression.  His blood pressure has been difficult to control, and he is required a Cardene drip.  He is currently on amlodipine 10 mg/day, and his losartan was increased today.  He has also been on Decadron, which may be driving up his blood pressure.  He had a right bundle branch block on his EKG, but no chest pain.  An echocardiogram on 2023 showed a hyperdynamic ejection fraction of greater than 70% and mild LVOT gradients of 17 mmHg at rest and 32 mmHg with Valsalva.  There was no significant valvular disease.  The patient denied any coronary artery disease history.    History reviewed. No pertinent past medical history.      Past Surgical History:   Procedure Laterality Date    TONSILLECTOMY           No Known Allergies      No current facility-administered medications on file prior to encounter.     No current outpatient medications on file prior to encounter.         Social History     Socioeconomic History    Marital status: Single  "  Tobacco Use    Smoking status: Never     Passive exposure: Never    Smokeless tobacco: Never   Vaping Use    Vaping Use: Never used   Substance and Sexual Activity    Alcohol use: Never    Drug use: Never    Sexual activity: Defer         History reviewed. No pertinent family history.    REVIEW OF SYSTEMS:   Pertinent positives are noted in the HPI above.  Otherwise, all other systems were reviewed, and are negative.     Objective:     Vitals:    11/25/23 1550 11/25/23 1625 11/25/23 1727 11/25/23 2015   BP: 162/97 154/85 166/97 131/74   BP Location: Left arm Left arm  Left arm   Patient Position: Lying Lying  Lying   Pulse:    97   Resp:    16   Temp:    97.8 °F (36.6 °C)   TempSrc:    Oral   SpO2:    91%   Weight:       Height:         Body mass index is 28.75 kg/m².  Flowsheet Rows      Flowsheet Row First Filed Value   Admission Height 188 cm (74\") Documented at 11/22/2023 1033   Admission Weight 102 kg (225 lb) Documented at 11/22/2023 1033             General:    No acute distress, alert and oriented x4, pleasant                   Head:    Normocephalic, atraumatic.   Eyes:          Conjunctivae and sclerae normal, no icterus.   Throat:   No oral lesions, no thrush, oral mucosa moist.    Neck:   Supple, trachea midline.   Lungs:     Clear to auscultation bilaterally     Heart:    Regular rhythm and normal rate.  No murmurs, gallops, or rubs noted.   Abdomen:     Soft, non-tender, non-distended, positive bowel sounds.    Extremities:   No clubbing, cyanosis, or edema.     Pulses:   Pulses palpable and equal bilaterally.    Skin:   No bleeding or rash.   Neuro:   Non-focal.     Psychiatric:   Normal mood and affect.         Lab Review:                Results from last 7 days   Lab Units 11/25/23  0511   SODIUM mmol/L 140   POTASSIUM mmol/L 3.7   CHLORIDE mmol/L 109*   CO2 mmol/L 18.6*   BUN mg/dL 17   CREATININE mg/dL 0.79   GLUCOSE mg/dL 147*   CALCIUM mg/dL 8.7     Results from last 7 days   Lab Units " 11/22/23  1129   HSTROP T ng/L 8     Results from last 7 days   Lab Units 11/25/23  0510   WBC 10*3/mm3 14.94*   HEMOGLOBIN g/dL 15.5   HEMATOCRIT % 45.2   PLATELETS 10*3/mm3 216     Results from last 7 days   Lab Units 11/22/23  1129   INR  0.94   APTT seconds 29.8         Results from last 7 days   Lab Units 11/22/23  1129   MAGNESIUM mg/dL 2.2           EKG (reviewed by me personally): Normal sinus rhythm, right bundle branch block.      Assessment:   1.  Large left occipital brain mass, possible large meningioma  2.  Blurry vision and right homonymous hemianopsia  3.  Transient ataxia of the right upper extremity, improved  4.  Urinary tract infection, being treated  5.  Hypertension with hypertensive emergency on admission  6.  Hypokalemia, resolved  7.  Right bundle branch block  8.  Mild LVOT gradient secondary to hyperdynamic left ventricle    Plan:       I reviewed the echocardiogram personally.  He has a hyperdynamic ejection fraction with normal wall motion.  The LVOT gradient is mildly elevated secondary to the hyperdynamic EF.  There is no serious obstruction.  This should not affect him at surgery.    His blood pressure evidently has been quite difficult to control for some time.  He stopped taking his outpatient medications, but has felt fine up until these events.  He told me that it was difficult even with medications to get his systolic below 170 mmHg at times.  He was hypokalemic when he was admitted, which always raises the question of possible hyperaldosteronism.  He is already on losartan which will skew the assay.  I started him on spironolactone 25 mg/day to begin tomorrow morning.  His losartan was increased to 50 mg/day today, and his amlodipine is maximized to 10 mg/day.  I would keep him on the Cardene drip if needed around the time of surgery as well.  This should not preclude him from getting his surgical procedure on Monday from a cardiovascular perspective.    Thank you very much for  this consult.    Gilbert Robles MD

## 2023-11-26 NOTE — PLAN OF CARE
Pt. is A&O X 4. No complain of pain. Blurred vision better than before. NSR. RA Urinal at bedside. Cardene titrated to 5 mg/hr. NPO after MN today. Call light within reach.     Goal Outcome Evaluation:  Plan of Care Reviewed With: patient  Progress: improving

## 2023-11-26 NOTE — PROGRESS NOTES
"    Patient Name: Chato Potts  :1960  63 y.o.      Patient Care Team:  Provider, No Known as PCP - General    Chief Complaint:  Hypertension    Interval History:   BP is better but he remains on Cardene drip. He is concerned about insurance issues which may delay surgery. He denies chest pain or dyspnea.     Objective   Vital Signs  Temp:  [97.8 °F (36.6 °C)-98.2 °F (36.8 °C)] 98.2 °F (36.8 °C)  Heart Rate:  [] 111  Resp:  [16-18] 18  BP: (131-166)/(74-97) 140/92    Intake/Output Summary (Last 24 hours) at 2023 1559  Last data filed at 2023 0730  Gross per 24 hour   Intake 240 ml   Output 2100 ml   Net -1860 ml     Flowsheet Rows      Flowsheet Row First Filed Value   Admission Height 188 cm (74\") Documented at 2023 1033   Admission Weight 102 kg (225 lb) Documented at 2023 1033            Physical Exam:   General Appearance:    Alert, cooperative, in no acute distress   Lungs:     Clear to auscultation.  Normal respiratory effort and rate.      Heart:    Regular rhythm and normal rate, normal S1 and S2, no murmurs, gallops or rubs.     Chest Wall:    No abnormalities observed   Abdomen:     Soft, nontender, positive bowel sounds.     Extremities:   no cyanosis, clubbing or edema.  No marked joint deformities.  Adequate musculoskeletal strength.       Results Review:    Results from last 7 days   Lab Units 23  0447   SODIUM mmol/L 141   POTASSIUM mmol/L 3.8   CHLORIDE mmol/L 108*   CO2 mmol/L 22.0   BUN mg/dL 21   CREATININE mg/dL 0.78   GLUCOSE mg/dL 152*   CALCIUM mg/dL 8.4*     Results from last 7 days   Lab Units 23  1129   HSTROP T ng/L 8     Results from last 7 days   Lab Units 23  0447   WBC 10*3/mm3 12.23*   HEMOGLOBIN g/dL 14.4   HEMATOCRIT % 43.4   PLATELETS 10*3/mm3 218     Results from last 7 days   Lab Units 23  1129   INR  0.94   APTT seconds 29.8     Results from last 7 days   Lab Units 23  1129   MAGNESIUM mg/dL 2.2                 "   Medication Review:   amLODIPine, 10 mg, Oral, Q24H  cefTRIAXone, 1,000 mg, Intravenous, Q24H  dexAMETHasone, 4 mg, Intravenous, Q6H  famotidine, 20 mg, Intravenous, Q12H  levETIRAcetam, 500 mg, Oral, BID  losartan, 50 mg, Oral, Q24H  metoprolol succinate XL, 25 mg, Oral, Q24H  spironolactone, 25 mg, Oral, Daily         niCARdipine, 5-15 mg/hr, Last Rate: 5 mg/hr (11/26/23 1106)    large left occipital brain mass and right homonymous hemianopsia     Assessment & Plan     Large left occipital brain mass: arge left occipital brain mass and right homonymous hemianopsia. He is on dexamethasone and Keprra. Plans for surgery if insurance is approved.   Blurry vision and right homonymous hemianopsia  Transient ataxia of the right upper extremity, improved  Urinary tract infection: treated with ceftriaxone.   Hypertension with hypertensive emergency on admission: Better but still on Cardene drip.  Hypokalemia, resolved  Right bundle branch block - hyperdynamic EF with evidence of mild LVOT gradient but no obstruction.   Mild LVOT gradient secondary to hyperdynamic left ventricle - no obstruction.    The LVOT gradient is mildly elevated secondary to the hyperdynamic EF. There is no serious obstruction.  This should not affect him having surgery.     His blood pressure has historically been difficult to control.  He was hypokalemic when he was admitted, which always raises the question of possible hyperaldosteronism; losartan and spironolactone are prescribed in addition to amlodipine.    He is in sinus tachycardia now and remains on Cardene drip; I will start 25 mg metoprolol succinate.      This should not preclude him from getting his surgical procedure on Monday from a cardiovascular perspective.    JACQUELINE Parmar  [facility]  11/26/23  15:59 EST

## 2023-11-26 NOTE — PROGRESS NOTES
Name: Chato Potts ADMIT: 2023   : 1960  PCP: Provider, No Known    MRN: [id number] LOS: 4 days   AGE/SEX: 63 y.o. male  ROOM: [facility]     Subjective   Subjective   Patient seen and examined this morning. Hospital day 4. At time of my examination, patient is awake, alert and resting in bed.  He states that he feels well today, no acute complaints at present.     Objective   Objective   Vital Signs  Temp:  [97.8 °F (36.6 °C)-98.2 °F (36.8 °C)] 98.2 °F (36.8 °C)  Heart Rate:  [] 111  Resp:  [16-18] 18  BP: (131-173)/(74-97) 140/92  SpO2:  [91 %-97 %] 93 %  on   ;   Device (Oxygen Therapy): room air  Body mass index is 28.75 kg/m².  Physical Exam  Vitals and nursing note reviewed.   Constitutional:       General: He is awake. He is not in acute distress.     Appearance: He is overweight.   Eyes:      General: No scleral icterus.     Conjunctiva/sclera: Conjunctivae normal.   Cardiovascular:      Rate and Rhythm: Regular rhythm. Tachycardia present.      Pulses: Normal pulses.      Heart sounds: Normal heart sounds.   Pulmonary:      Effort: Pulmonary effort is normal. No respiratory distress.      Breath sounds: Normal breath sounds. No wheezing.   Abdominal:      General: Bowel sounds are normal.      Palpations: Abdomen is soft.      Tenderness: There is no abdominal tenderness.   Skin:     General: Skin is warm and dry.   Neurological:      General: No focal deficit present.      Mental Status: He is alert.   Psychiatric:         Behavior: Behavior is cooperative.       Results Review     I reviewed the patient's new clinical results.  Results from last 7 days   Lab Units 23  0447 23  0510 23  0535 23  1129   WBC 10*3/mm3 12.23* 14.94* 12.74* 4.71   HEMOGLOBIN g/dL 14.4 15.5 14.2 14.8   PLATELETS 10*3/mm3 218 216 199 175     Results from last 7 days   Lab Units 23  0447 23  0511 23  0535 23  0425   SODIUM mmol/L 141 140 140 141   POTASSIUM mmol/L  3.8 3.7 3.6 4.2   CHLORIDE mmol/L 108* 109* 109* 108*   CO2 mmol/L 22.0 18.6* 20.0* 20.9*   BUN mg/dL 21 17 17 13   CREATININE mg/dL 0.78 0.79 0.75* 0.99   GLUCOSE mg/dL 152* 147* 147* 156*   EGFR mL/min/1.73 100.8 100.4 102.0 86.1     Results from last 7 days   Lab Units 11/23/23  0425 11/22/23  1129   ALBUMIN g/dL 4.4 4.7   BILIRUBIN mg/dL  --  0.8   ALK PHOS U/L  --  78   AST (SGOT) U/L  --  19   ALT (SGPT) U/L  --  21     Results from last 7 days   Lab Units 11/26/23  0447 11/25/23  0511 11/24/23  0535 11/23/23  0425 11/22/23  1129   CALCIUM mg/dL 8.4* 8.7 8.6 9.0 8.9   ALBUMIN g/dL  --   --   --  4.4 4.7   MAGNESIUM mg/dL  --   --   --   --  2.2   PHOSPHORUS mg/dL  --   --   --  2.0*  --        Hemoglobin A1C   Date/Time Value Ref Range Status   11/25/2023 0510 5.50 4.80 - 5.60 % Final       No radiology results for the last day    I have personally reviewed all medications:  Scheduled Medications  amLODIPine, 10 mg, Oral, Q24H  cefTRIAXone, 1,000 mg, Intravenous, Q24H  dexAMETHasone, 4 mg, Intravenous, Q6H  famotidine, 20 mg, Intravenous, Q12H  levETIRAcetam, 500 mg, Oral, BID  losartan, 50 mg, Oral, Q24H  spironolactone, 25 mg, Oral, Daily    Infusions  niCARdipine, 5-15 mg/hr, Last Rate: 5 mg/hr (11/26/23 0455)    Diet  Diet: Cardiac Diets; Healthy Heart (2-3 Na+); Texture: Regular Texture (IDDSI 7); Fluid Consistency: Thin (IDDSI 0)  NPO Diet NPO Type: Sips with Meds    I have personally reviewed:  [x]  Laboratory   [x]  Microbiology   [x]  Radiology   [x]  EKG/Telemetry  [x]  Cardiology/Vascular   []  Pathology    []  Records       Assessment/Plan     Active Hospital Problems    Diagnosis  POA    **Hypertensive emergency [I16.1]  Yes    Acute cystitis without hematuria [N30.00]  Yes    Brain mass [G93.89]  Yes    Vision disturbance [H53.9]  Yes    Hypokalemia [E87.6]  Yes    Brain compression [G93.5]  Yes    Brain edema [G93.6]  Yes      Resolved Hospital Problems   No resolved problems to display.  "      63 y.o. male admitted with Hypertensive emergency.    Hypertensive Emergency  - Patient noted to have SBP >200 and DBP >120 on arrival, coupled with papilledema. Findings consistent with hypertensive emergency. 2D Echo (11/23/23) showing Left ventricular systolic function is hyperdynamic (EF > 70%), LVOT pressure gradient 17 at rest and 32 with provocation, likely a result of hyperdynamic left ventricle. Also noting mild to moderate aortic regurgitation.  - He remains on Cardene drip at this time, however, blood pressure is significantly improved today after increases and amlodipine and losartan on 11/25.    - Cardiology consulted and following.  Will follow their recommendations regarding ongoing blood pressure management and findings of LVOT on echocardiogram.  - For now, continue Cardene, Amlodipine and Losartan as prescribed.     Brain Mass  - MRI brain obtained and noted \"large partially calcified dural-based mass with a broad base with respect to the left aspect of the falx cerebri medial to the left   parietal and occipital lobes\" with mass effect upon the medial aspect of the left parietal and occipital lobes with marked sulcal effacement and ventricular effacement of the posterior aspect of the lateral left ventricle, covered with 8 mm of midline shift to the right and small amount of vasogenic edema.  - Imaging findings coupled with patient's endorsement of visual field deficits and RUE ataxia. - CT C/A/P obtained, however, no evidence of findings concerning for malignant process.  - Neurosurgery consulted and following. He is on Decadron at present,  appreciate MALINDA recs, plans for craniotomy +/- prior embolization, he is to be NPO after midnight on Monday 11/27. Will continue to follow their plans/recommendations. Greatly appreciate their help.  - Keppra for seizure prophylaxis, Famotidine for GI prophylaxis.  - Closely monitor, fall/aspiration precautions, PT/OT.    Acute cystitis without " hematuria  - Patient presenting with symptoms of dysuria coupled with UA findings showing 2+ leukocyte esterase, TNTC WBC, 2+ bacteria.  He is currently on ceftriaxone, urine culture showing >100K E. Coli, pan-sensitive.  - Continue empiric ceftriaxone as prescribed.  - Acute urinary retention protocol.    Leukocytosis  - WBC count elevated on most recent labs, however, improving from prior. Etiology of WBC elevation likely a result of infection + corticosteroids.   - Order repeat CBC in AM for reassessment.      Hyperglycemia  - Glucose elevated on most recent labs. Patient without known history of T2DM.  Etiology likely a result of corticosteroids. Hemoglobin A1c 5.50% (11/25/23).   - Monitor for now, continue with POC glucose checks, can add correctional SSI if needed.    Hypokalemia, resolved  - Potassium noted to be low previously, repleted per electrolyte protocol and improved on most recent testing.      SCDs for DVT prophylaxis.  Full code.  Discussed with patient, family, nursing staff, and consulting provider.  Anticipate discharge  TBD  timing yet to be determined.      Mikel Leonard DO  [facility]  11/26/23

## 2023-11-27 ENCOUNTER — TELEPHONE (OUTPATIENT)
Dept: NEUROSURGERY | Facility: CLINIC | Age: 63
End: 2023-11-27
Payer: COMMERCIAL

## 2023-11-27 VITALS
WEIGHT: 234.13 LBS | BODY MASS INDEX: 30.05 KG/M2 | SYSTOLIC BLOOD PRESSURE: 172 MMHG | DIASTOLIC BLOOD PRESSURE: 102 MMHG | OXYGEN SATURATION: 94 % | RESPIRATION RATE: 18 BRPM | HEIGHT: 74 IN | HEART RATE: 105 BPM | TEMPERATURE: 98.1 F

## 2023-11-27 DIAGNOSIS — G93.89 BRAIN MASS: Primary | ICD-10-CM

## 2023-11-27 DIAGNOSIS — I16.1 HYPERTENSIVE EMERGENCY: ICD-10-CM

## 2023-11-27 DIAGNOSIS — G93.5 BRAIN COMPRESSION: ICD-10-CM

## 2023-11-27 DIAGNOSIS — G93.6 BRAIN EDEMA: ICD-10-CM

## 2023-11-27 DIAGNOSIS — H53.9 VISION DISTURBANCE: ICD-10-CM

## 2023-11-27 LAB
ANION GAP SERPL CALCULATED.3IONS-SCNC: 12.5 MMOL/L (ref 5–15)
BASOPHILS # BLD AUTO: 0.02 10*3/MM3 (ref 0–0.2)
BASOPHILS NFR BLD AUTO: 0.1 % (ref 0–1.5)
BUN SERPL-MCNC: 19 MG/DL (ref 8–23)
BUN/CREAT SERPL: 22.1 (ref 7–25)
CALCIUM SPEC-SCNC: 8.3 MG/DL (ref 8.6–10.5)
CHLORIDE SERPL-SCNC: 108 MMOL/L (ref 98–107)
CO2 SERPL-SCNC: 19.5 MMOL/L (ref 22–29)
CREAT SERPL-MCNC: 0.86 MG/DL (ref 0.76–1.27)
DEPRECATED RDW RBC AUTO: 39.7 FL (ref 37–54)
EGFRCR SERPLBLD CKD-EPI 2021: 97.9 ML/MIN/1.73
EOSINOPHIL # BLD AUTO: 0 10*3/MM3 (ref 0–0.4)
EOSINOPHIL NFR BLD AUTO: 0 % (ref 0.3–6.2)
ERYTHROCYTE [DISTWIDTH] IN BLOOD BY AUTOMATED COUNT: 13 % (ref 12.3–15.4)
GLUCOSE SERPL-MCNC: 141 MG/DL (ref 65–99)
HCT VFR BLD AUTO: 47 % (ref 37.5–51)
HGB BLD-MCNC: 15.6 G/DL (ref 13–17.7)
IMM GRANULOCYTES # BLD AUTO: 0.28 10*3/MM3 (ref 0–0.05)
IMM GRANULOCYTES NFR BLD AUTO: 2 % (ref 0–0.5)
LYMPHOCYTES # BLD AUTO: 0.74 10*3/MM3 (ref 0.7–3.1)
LYMPHOCYTES NFR BLD AUTO: 5.4 % (ref 19.6–45.3)
MCH RBC QN AUTO: 28 PG (ref 26.6–33)
MCHC RBC AUTO-ENTMCNC: 33.2 G/DL (ref 31.5–35.7)
MCV RBC AUTO: 84.2 FL (ref 79–97)
MONOCYTES # BLD AUTO: 0.7 10*3/MM3 (ref 0.1–0.9)
MONOCYTES NFR BLD AUTO: 5.1 % (ref 5–12)
NEUTROPHILS NFR BLD AUTO: 11.97 10*3/MM3 (ref 1.7–7)
NEUTROPHILS NFR BLD AUTO: 87.4 % (ref 42.7–76)
NRBC BLD AUTO-RTO: 0 /100 WBC (ref 0–0.2)
PLATELET # BLD AUTO: 263 10*3/MM3 (ref 140–450)
PMV BLD AUTO: 9.4 FL (ref 6–12)
POTASSIUM SERPL-SCNC: 3.5 MMOL/L (ref 3.5–5.2)
RBC # BLD AUTO: 5.58 10*6/MM3 (ref 4.14–5.8)
SODIUM SERPL-SCNC: 140 MMOL/L (ref 136–145)
WBC NRBC COR # BLD AUTO: 13.71 10*3/MM3 (ref 3.4–10.8)

## 2023-11-27 PROCEDURE — 99232 SBSQ HOSP IP/OBS MODERATE 35: CPT | Performed by: INTERNAL MEDICINE

## 2023-11-27 PROCEDURE — 99231 SBSQ HOSP IP/OBS SF/LOW 25: CPT

## 2023-11-27 PROCEDURE — 85025 COMPLETE CBC W/AUTO DIFF WBC: CPT | Performed by: INTERNAL MEDICINE

## 2023-11-27 PROCEDURE — 25810000003 SODIUM CHLORIDE 0.9 % SOLUTION: Performed by: INTERNAL MEDICINE

## 2023-11-27 PROCEDURE — 80048 BASIC METABOLIC PNL TOTAL CA: CPT | Performed by: INTERNAL MEDICINE

## 2023-11-27 PROCEDURE — 25010000002 DEXAMETHASONE PER 1 MG: Performed by: NURSE PRACTITIONER

## 2023-11-27 PROCEDURE — 25010000002 CEFTRIAXONE PER 250 MG: Performed by: INTERNAL MEDICINE

## 2023-11-27 RX ORDER — DEXAMETHASONE 4 MG/1
4 TABLET ORAL EVERY 8 HOURS
Qty: 15 TABLET | Refills: 0 | Status: SHIPPED | OUTPATIENT
Start: 2023-11-27 | End: 2023-12-02

## 2023-11-27 RX ORDER — METOPROLOL SUCCINATE 25 MG/1
25 TABLET, EXTENDED RELEASE ORAL
Qty: 30 TABLET | Refills: 0 | Status: SHIPPED | OUTPATIENT
Start: 2023-11-28

## 2023-11-27 RX ORDER — AMLODIPINE BESYLATE 10 MG/1
10 TABLET ORAL
Qty: 30 TABLET | Refills: 0 | Status: SHIPPED | OUTPATIENT
Start: 2023-11-28

## 2023-11-27 RX ORDER — LEVETIRACETAM 500 MG/1
500 TABLET ORAL 2 TIMES DAILY
Qty: 60 TABLET | Refills: 0 | Status: SHIPPED | OUTPATIENT
Start: 2023-11-27

## 2023-11-27 RX ORDER — LOSARTAN POTASSIUM 50 MG/1
50 TABLET ORAL
Qty: 30 TABLET | Refills: 0 | Status: SHIPPED | OUTPATIENT
Start: 2023-11-28

## 2023-11-27 RX ORDER — FAMOTIDINE 20 MG/1
20 TABLET, FILM COATED ORAL 2 TIMES DAILY
Qty: 60 TABLET | Refills: 0 | Status: SHIPPED | OUTPATIENT
Start: 2023-11-27

## 2023-11-27 RX ORDER — SPIRONOLACTONE 50 MG/1
25 TABLET, FILM COATED ORAL DAILY
Qty: 15 TABLET | Refills: 0 | Status: SHIPPED | OUTPATIENT
Start: 2023-11-28

## 2023-11-27 RX ADMIN — LOSARTAN POTASSIUM 50 MG: 50 TABLET, FILM COATED ORAL at 08:23

## 2023-11-27 RX ADMIN — FAMOTIDINE 20 MG: 10 INJECTION INTRAVENOUS at 09:57

## 2023-11-27 RX ADMIN — SPIRONOLACTONE 25 MG: 25 TABLET, FILM COATED ORAL at 08:22

## 2023-11-27 RX ADMIN — NICARDIPINE HYDROCHLORIDE 7.5 MG/HR: 25 INJECTION, SOLUTION INTRAVENOUS at 00:11

## 2023-11-27 RX ADMIN — CEFTRIAXONE SODIUM 1000 MG: 1 INJECTION, POWDER, FOR SOLUTION INTRAMUSCULAR; INTRAVENOUS at 09:57

## 2023-11-27 RX ADMIN — AMLODIPINE BESYLATE 10 MG: 10 TABLET ORAL at 08:23

## 2023-11-27 RX ADMIN — NICARDIPINE HYDROCHLORIDE 5 MG/HR: 25 INJECTION, SOLUTION INTRAVENOUS at 08:24

## 2023-11-27 RX ADMIN — DEXAMETHASONE SODIUM PHOSPHATE 4 MG: 4 INJECTION, SOLUTION INTRA-ARTICULAR; INTRALESIONAL; INTRAMUSCULAR; INTRAVENOUS; SOFT TISSUE at 11:39

## 2023-11-27 RX ADMIN — DEXAMETHASONE SODIUM PHOSPHATE 4 MG: 4 INJECTION, SOLUTION INTRA-ARTICULAR; INTRALESIONAL; INTRAMUSCULAR; INTRAVENOUS; SOFT TISSUE at 05:00

## 2023-11-27 RX ADMIN — NICARDIPINE HYDROCHLORIDE 5 MG/HR: 25 INJECTION, SOLUTION INTRAVENOUS at 02:54

## 2023-11-27 RX ADMIN — LEVETIRACETAM 500 MG: 500 TABLET, FILM COATED ORAL at 08:23

## 2023-11-27 RX ADMIN — METOPROLOL SUCCINATE 25 MG: 25 TABLET, EXTENDED RELEASE ORAL at 08:23

## 2023-11-27 NOTE — PAYOR COMM NOTE
"Ector Potts (63 y.o. Male)                               ATTENTION; CONTINUED CLINICALS CASE REF [id number]                             REPLY TO [facility] DEPT  [phone] OR CALL   BAMBI RIVER LPN           Date of Birth   1960    Social Security Number       Address   [address] [place] [address]    Home Phone   [phone]    MRN   [id number]       Mandaen   [employer]    Marital Status   Single                            Admission Date   11/22/23    Admission Type   Emergency    Admitting Provider   Jeremias Anglin MD    Attending Provider   Mikel Leonard DO    Department, Room/Bed   [address], S509/1       Discharge Date       Discharge Disposition       Discharge Destination                                 Attending Provider: Mikel Leonard DO    Allergies: No Known Allergies    Isolation: None   Infection: None   Code Status: CPR    Ht: 188 cm (74.02\")   Wt: 102 kg (224 lb)    Admission Cmt: None   Principal Problem: Hypertensive emergency [I16.1]                   Active Insurance as of 11/22/2023       Primary Coverage       Payor Plan Insurance Group Employer/Plan Group    ANTH [employer] ANTH PATHWAY TRANSITION HMO NON PAR [id number]       Payor Plan Address Payor Plan Phone Number Payor Plan Fax Number Effective Dates    [address]   8/1/2023 - None Entered    [place] [address]         Subscriber Name Subscriber Birth Date Member ID       ECTOR POTTS 1960 [id number]                     Emergency Contacts        (Rel.) Home Phone Work Phone Mobile Phone    RAYNE POTTS (Brother) -- -- [phone]    ALEXANDRA POTTS (Brother) -- -- [phone]              Vital Signs (last 3 days)       Date/Time Temp Temp src Pulse Resp BP Patient Position SpO2    11/27/23 0617 -- -- 98 -- 143/88 -- 95    11/27/23 0600 -- -- 89 -- 141/87 -- 92    11/27/23 0545 -- -- 94 -- 145/89 -- --    11/27/23 0530 -- -- 88 -- 133/82 -- --    11/27/23 0515 " -- -- 83 -- 135/82 -- --    11/27/23 0505 -- -- 91 -- 144/88 -- --    11/27/23 0504 -- -- -- -- -- -- 94    11/27/23 0500 -- -- -- -- -- -- 94    11/27/23 0445 -- -- 78 -- 127/82 -- --    11/27/23 0430 -- -- 80 -- 131/86 -- --    11/27/23 0415 -- -- 81 -- 130/89 -- --    11/27/23 0400 -- -- 81 -- 130/86 -- 90    11/27/23 0345 -- -- 86 -- 133/89 -- 90    11/27/23 0330 -- -- 81 -- 125/82 -- --    11/27/23 0315 -- -- 76 -- 122/78 -- --    11/27/23 0300 -- -- 76 -- 119/77 -- 90    11/27/23 0252 -- -- 79 -- 123/81 -- --    11/27/23 0245 -- -- 83 -- 123/81 -- --    11/27/23 0230 -- -- 78 -- 122/77 -- --    11/27/23 0215 -- -- 68 -- 111/72 -- --    11/27/23 0200 -- -- 68 -- 112/74 -- --    11/27/23 0145 -- -- 68 -- 113/72 -- --    11/27/23 0130 -- -- 67 -- 110/71 -- 92    11/27/23 0115 -- -- 68 -- 115/72 -- 93    11/27/23 0100 -- -- 68 -- 115/74 -- 94    11/27/23 0045 -- -- 66 -- 114/76 -- 91    11/27/23 0030 -- -- 74 -- 117/76 -- 90    11/27/23 0015 -- -- 76 -- 120/78 -- 93    11/27/23 0011 -- -- 79 -- 122/79 -- --    11/27/23 0000 -- -- 80 -- 122/79 -- 89    11/26/23 2345 98.1 (36.7) Oral 81 -- 122/78 -- 94    11/26/23 2330 -- -- 82 -- 116/76 -- 91    11/26/23 2315 -- -- 88 -- 129/79 -- 91    11/26/23 2309 -- -- 85 -- 124/80 -- --    11/26/23 2300 -- -- 83 -- 124/80 -- 90    11/26/23 2245 -- -- 88 -- 129/77 -- 93    11/26/23 2230 -- -- 78 -- 115/78 -- 93    11/26/23 2215 -- -- 81 -- 128/76 -- 86    11/26/23 2200 -- -- 88 -- 124/76 -- 91    11/26/23 2145 -- -- 100 -- 144/120 -- 88    11/26/23 2135 -- -- 89 -- 138/80 -- --    11/26/23 2130 -- -- 95 -- 138/80 -- 93    11/26/23 2115 -- -- 90 -- 127/72 -- 93 11/26/23 2100 -- -- 91 -- 121/71 -- 93    11/26/23 2045 -- -- 91 -- 122/68 -- 91    11/26/23 2030 -- -- 104 -- 127/79 -- 93 11/26/23 2015 -- -- 105 -- 148/75 -- 94    11/26/23 2000 -- -- 109 -- 151/87 -- 94    11/26/23 1945 -- -- 104 -- 124/73 -- 91    11/26/23 1930 -- -- 116 -- 153/75 -- 94    11/26/23 1915 -- --  114 -- 136/78 -- 93    11/26/23 1900 -- -- 108 -- 129/74 -- 93    11/26/23 1604 -- -- -- -- 173/91 -- --    11/26/23 0750 -- -- 111 18 140/92 Lying 93    11/25/23 2315 98.2 (36.8) Oral 73 16 132/76 Lying 95    11/25/23 2015 97.8 (36.6) Oral 97 16 131/74 Lying 91    11/25/23 1727 -- -- -- -- 166/97 -- --    11/25/23 1625 -- -- -- -- 154/85 Lying --    11/25/23 1550 -- -- -- -- 162/97 Lying --    11/25/23 1524 -- -- 100 16 166/92 Lying 94    11/25/23 1300 -- -- -- -- 157/91 Lying --    11/25/23 1235 -- -- 96 -- 173/89 -- --    11/25/23 1131 -- -- 103 16 164/94 Lying 97    11/25/23 0746 -- -- -- -- 165/90 -- --    11/25/23 0557 -- -- 90 -- 147/89 -- --    11/24/23 2320 97.7 (36.5) Oral 86 18 140/84 Lying 91    11/24/23 1925 -- -- -- -- 159/97 -- --    11/24/23 1920 98.1 (36.7) Oral 111 18 163/90 Lying 93    11/24/23 1330 -- -- 94 -- 142/84 -- 93    11/24/23 1315 -- -- 123 -- 169/109 -- 95    11/24/23 1100 -- -- 126 -- 146/94 -- 97    11/24/23 1045 -- -- 117 -- 188/88 -- 97    11/24/23 1030 -- -- 109 -- 167/81 -- 100    11/24/23 1015 -- -- 101 -- 146/86 -- 96    11/24/23 1000 -- -- 100 -- 155/84 -- 96    11/24/23 0915 -- -- 107 -- 171/91 -- 95    11/24/23 0859 -- -- -- -- 154/99 -- --    11/24/23 0728 -- -- -- 18 -- Lying --    11/24/23 0445 98.4 (36.9) Oral -- 18 -- Lying --          Oxygen Therapy (last 3 days)       Date/Time SpO2 Device (Oxygen Therapy) Flow (L/min) Oxygen Concentration (%) ETCO2 (mmHg)    11/27/23 0617 95 -- -- -- --    11/27/23 0600 92 -- -- -- --    11/27/23 0504 94 -- -- -- --    11/27/23 0500 94 -- -- -- --    11/27/23 0400 90 -- -- -- --    11/27/23 0345 90 -- -- -- --    11/27/23 0300 90 -- -- -- --    11/27/23 0130 92 -- -- -- --    11/27/23 0115 93 -- -- -- --    11/27/23 0100 94 -- -- -- --    11/27/23 0045 91 -- -- -- --    11/27/23 0030 90 -- -- -- --    11/27/23 0015 93 -- -- -- --    11/27/23 0000 89 -- -- -- --    11/26/23 2345 94 -- -- -- --    11/26/23 2330 91 -- -- -- --     11/26/23 2315 91 -- -- -- --    11/26/23 2300 90 -- -- -- --    11/26/23 2245 93 -- -- -- --    11/26/23 2230 93 -- -- -- --    11/26/23 2215 86 -- -- -- --    11/26/23 2200 91 -- -- -- --    11/26/23 2145 88 -- -- -- --    11/26/23 2130 93 -- -- -- --    11/26/23 2115 93 -- -- -- --    11/26/23 2100 93 -- -- -- --    11/26/23 2045 91 -- -- -- --    11/26/23 2030 93 -- -- -- --    11/26/23 2015 94 -- -- -- --    11/26/23 2000 94 room air -- -- --    11/26/23 1945 91 -- -- -- --    11/26/23 1930 94 -- -- -- --    11/26/23 1915 93 -- -- -- --    11/26/23 1900 93 -- -- -- --    11/26/23 0800 -- room air -- -- --    11/26/23 0750 93 room air -- -- --    11/26/23 0008 -- room air -- -- --    11/25/23 2315 95 -- -- -- --    11/25/23 2033 -- room air -- -- --    11/25/23 2015 91 -- -- -- --    11/25/23 1524 94 -- -- -- --    11/25/23 1131 97 -- -- -- --    11/25/23 0800 -- room air -- -- --    11/25/23 0010 -- room air -- -- --    11/24/23 2320 91 -- -- -- --    11/24/23 2105 -- room air -- -- --    11/24/23 1920 93 -- -- -- --    11/24/23 1330 93 -- -- -- --    11/24/23 1315 95 -- -- -- --    11/24/23 1100 97 -- -- -- --    11/24/23 1045 97 -- -- -- --    11/24/23 1030 100 -- -- -- --    11/24/23 1015 96 -- -- -- --    11/24/23 1000 96 -- -- -- --    11/24/23 0915 95 -- -- -- --    11/24/23 0001 -- room air -- -- --          Orders (last 72 hrs)        Start     Ordered    11/27/23 0600  CBC Auto Differential  PROCEDURE ONCE         11/26/23 2201 11/27/23 0001  NPO Diet NPO Type: Sips with Meds  Diet Effective Midnight,   Status:  Canceled         11/22/23 1624    11/26/23 1530  metoprolol succinate XL (TOPROL-XL) 24 hr tablet 25 mg  Every 24 Hours Scheduled         11/26/23 1412    11/26/23 0900  amLODIPine (NORVASC) tablet 10 mg  Every 24 Hours Scheduled         11/25/23 1256    11/26/23 0900  losartan (COZAAR) tablet 50 mg  Every 24 Hours Scheduled         11/25/23 1515    11/26/23 0900  spironolactone (ALDACTONE)  tablet 25 mg  Daily         11/25/23 2302    11/26/23 0600  CBC Auto Differential  PROCEDURE ONCE         11/25/23 2201    11/25/23 1516  Inpatient Cardiology Consult  Once        Comments: Spoke to Dr. Robles he is going to see patient in AM on 11/26.   Specialty:  Cardiology  Provider:  Amol Robles MD    11/25/23 1515    11/25/23 1430  amLODIPine (NORVASC) tablet 5 mg  Once         11/25/23 1256    11/25/23 0600  Hemoglobin A1c  Morning Draw         11/24/23 1149    11/25/23 0600  CBC Auto Differential  PROCEDURE ONCE         11/24/23 2201    11/24/23 1300  Strict Intake & Output  Every Hour       11/24/23 1201    11/24/23 1202  OT Consult: Eval & Treat  Once         11/24/23 1201    11/24/23 1202  PT Consult: Eval & Treat Discharge Placement Assessment, Functional Mobility Below Baseline  Once         11/24/23 1201    11/24/23 1202  Aspiration Precautions  Continuous         11/24/23 1201    11/24/23 1202  Elevate HOB  Continuous         11/24/23 1201    11/24/23 1202  Fall Precautions  Continuous         11/24/23 1201    11/24/23 1202  Daily Weights  Daily       11/24/23 1201    11/24/23 1202  Notify Provider Acute Urinary Retention  Until Discontinued         11/24/23 1201    11/24/23 0600  CBC & Differential  Daily       11/23/23 1619    11/24/23 0600  Basic Metabolic Panel  Daily       11/23/23 1619    11/23/23 1400  niCARdipine (CARDENE) 25 mg in 250 mL NS infusion kit  Titrated         11/23/23 1307    11/23/23 1400  losartan (COZAAR) tablet 25 mg  Every 24 Hours Scheduled,   Status:  Discontinued         11/23/23 1308    11/23/23 1000  cefTRIAXone (ROCEPHIN) 1,000 mg in sodium chloride 0.9 % 100 mL IVPB-VTB  Every 24 Hours         11/23/23 0901    11/22/23 2100  levETIRAcetam (KEPPRA) tablet 500 mg  2 Times Daily        Note to Pharmacy: For tube route administration disperse crushed tablets in 10 mL of water, shake for 5 minutes to dissolve, and administer immediately via enteral feeding  tube.    11/22/23 1623    11/22/23 2100  famotidine (PEPCID) injection 20 mg  Every 12 Hours Scheduled         11/22/23 1623    11/22/23 2000  Vital Signs  Every 4 Hours      Comments: Per per hospital policy    11/22/23 1707    11/22/23 1800  Oral Care  2 Times Daily       11/22/23 1707    11/22/23 1800  amLODIPine (NORVASC) tablet 5 mg  Every 24 Hours Scheduled,   Status:  Discontinued         11/22/23 1707    11/22/23 1721  influenza vac split quad (FLUZONE,FLUARIX,AFLURIA,FLULAVAL) injection 0.5 mL  During Hospitalization         11/22/23 1722    11/22/23 1708  Intake & Output  Every Shift       11/22/23 1707    11/22/23 1707  labetalol (NORMODYNE,TRANDATE) injection 10 mg  Every 2 Hours PRN         11/22/23 1707    11/22/23 1707  nitroglycerin (NITROSTAT) SL tablet 0.4 mg  Every 5 Minutes PRN         11/22/23 1707    11/22/23 1707  acetaminophen (TYLENOL) tablet 650 mg  Every 4 Hours PRN         11/22/23 1707    11/22/23 1707  ondansetron (ZOFRAN) tablet 4 mg  Every 6 Hours PRN        See Hyperspace for full Linked Orders Report.    11/22/23 1707    11/22/23 1707  ondansetron (ZOFRAN) injection 4 mg  Every 6 Hours PRN        See Hyperspace for full Linked Orders Report.    11/22/23 1707    11/22/23 1707  melatonin tablet 3 mg  Nightly PRN         11/22/23 1707    11/22/23 1700  dexAMETHasone (DECADRON) injection 4 mg  Every 6 Hours         11/22/23 1623    11/22/23 1402  iopamidol (ISOVUE-370) 76 % injection 95 mL  Once in Imaging         11/22/23 1346    Unscheduled  Up with assistance  As Needed       11/22/23 1707    Unscheduled  Assess Patient For Urinary Retention  As Needed      Comments: Signs / Symptoms Urinary Retention:  - Bladder Palpable  - Suprapubic / Bladder Discomfort or Pain  - Urge to Void, But Unable  - Reports Unable to Void After 8 Hours    11/24/23 1201    Unscheduled  Utilize Voiding Measures if Retention is Suspected  As Needed      Comments: Voiding Measures:  - Privacy  - Relaxed  Environment  - Suprapubic Massage  - Suprapubic Warm Compress  - Trigger Techniques  - Stand to Void   - Bedside Commode    11/24/23 1201    Unscheduled  Bladder Scan for Suspected Urinary Retention  As Needed       11/24/23 1201    Unscheduled  Monitor Every 1-2 Hours for Spontaneous Void if Bladder Scan Volume is Less Than 500mL & Patient is Without Symptoms of Bladder Discomfort / Distention  As Needed       11/24/23 1201    Unscheduled  Straight Cath For Acute Retention if Bladder Scan Volume is Greater Than 500mL or Patient Has Symptoms of Bladder Discomfort / Distention (Unless Contraindicated)  As Needed       11/24/23 1201    --  SCANNED - TELEMETRY           11/22/23 0000    --  SCANNED - TELEMETRY           11/22/23 0000    --  SCANNED - TELEMETRY           11/22/23 0000    --  SCANNED - TELEMETRY           11/22/23 0000    --  SCANNED - TELEMETRY           11/22/23 0000    --  SCANNED - TELEMETRY           11/22/23 0000    --  SCANNED - TELEMETRY           11/22/23 0000    --  SCANNED - TELEMETRY           11/22/23 0000    --  SCANNED - TELEMETRY           11/22/23 0000    --  SCANNED - TELEMETRY           11/22/23 0000    --  SCANNED - TELEMETRY           11/22/23 0000    --  SCANNED - TELEMETRY           11/22/23 0000    --  SCANNED - TELEMETRY           11/22/23 0000    --  SCANNED - TELEMETRY           11/22/23 0000    --  SCANNED - TELEMETRY           11/22/23 0000                     Physician Progress Notes (last 72 hours)        Kike Rodriguez MD at 11/26/23 1943          Events of the long holiday weekend are noted as I was consulted on this patient late in the afternoon on Wednesday, November 22 for visual disturbance and a CT Head followed by MRI brain that documented a large deeply enhancing well-circumscribed left occipital mass.  His systemic workup ruled out systemic malignancy.      Patient was admitted for malignant hypertension and has continued to have very labile blood  pressure, even this afternoon, with a systolic blood pressure documented at 175.  He remains on a Cardene drip.  Cardiology has cleared him for surgery from the cardiac standpoint but his SBP is still quite labile.  I came in this evening to review the workup with the patient and at this point with the labile blood pressure and stable symptomatology with what appears to be a very longstanding chronic brain mass urgent surgery tomorrow is not appropriate.  Once his blood pressure is more consistently controlled off IV meds he will need a cerebral angiogram with possible embolization to reduce the risk of life-threatening hemorrhage coordinated with and prior to tumor resection.  He may have to be discharged (once his SBP is controlled off Cardene) on low dose Decadron and Keppra to schedule the procedures in the near future.      Tomorrow we will work on the coordination of those treatments, however, our service was also notified last week that the patient's insurance is not in network with [facility] and we will ask the financial services team to discuss this in more detail with the patient tomorrow.    Electronically signed by Kike Rodriguez MD at 23       Verónica Izaguirre APRN at 23 [id number]              Patient Name: Chato Potts  :1960  63 y.o.      Patient Care Team:  Provider, No Known as PCP - General    Chief Complaint:  Hypertension    Interval History:   BP is better but he remains on Cardene drip. He is concerned about insurance issues which may delay surgery. He denies chest pain or dyspnea.     Objective   Vital Signs  Temp:  [97.8 °F (36.6 °C)-98.2 °F (36.8 °C)] 98.2 °F (36.8 °C)  Heart Rate:  [] 111  Resp:  [16-18] 18  BP: (131-166)/(74-97) 140/92    Intake/Output Summary (Last 24 hours) at 2023 3958  Last data filed at 2023 0730  Gross per 24 hour   Intake 240 ml   Output 2100 ml   Net -1860 ml     Flowsheet Rows      Flowsheet Row  "First Filed Value   Admission Height 188 cm (74\") Documented at 11/22/2023 1033   Admission Weight 102 kg (225 lb) Documented at 11/22/2023 1033            Physical Exam:   General Appearance:    Alert, cooperative, in no acute distress   Lungs:     Clear to auscultation.  Normal respiratory effort and rate.      Heart:    Regular rhythm and normal rate, normal S1 and S2, no murmurs, gallops or rubs.     Chest Wall:    No abnormalities observed   Abdomen:     Soft, nontender, positive bowel sounds.     Extremities:   no cyanosis, clubbing or edema.  No marked joint deformities.  Adequate musculoskeletal strength.       Results Review:    Results from last 7 days   Lab Units 11/26/23  0447   SODIUM mmol/L 141   POTASSIUM mmol/L 3.8   CHLORIDE mmol/L 108*   CO2 mmol/L 22.0   BUN mg/dL 21   CREATININE mg/dL 0.78   GLUCOSE mg/dL 152*   CALCIUM mg/dL 8.4*     Results from last 7 days   Lab Units 11/22/23  1129   HSTROP T ng/L 8     Results from last 7 days   Lab Units 11/26/23  0447   WBC 10*3/mm3 12.23*   HEMOGLOBIN g/dL 14.4   HEMATOCRIT % 43.4   PLATELETS 10*3/mm3 218     Results from last 7 days   Lab Units 11/22/23  1129   INR  0.94   APTT seconds 29.8     Results from last 7 days   Lab Units 11/22/23  1129   MAGNESIUM mg/dL 2.2                   Medication Review:   amLODIPine, 10 mg, Oral, Q24H  cefTRIAXone, 1,000 mg, Intravenous, Q24H  dexAMETHasone, 4 mg, Intravenous, Q6H  famotidine, 20 mg, Intravenous, Q12H  levETIRAcetam, 500 mg, Oral, BID  losartan, 50 mg, Oral, Q24H  metoprolol succinate XL, 25 mg, Oral, Q24H  spironolactone, 25 mg, Oral, Daily         niCARdipine, 5-15 mg/hr, Last Rate: 5 mg/hr (11/26/23 1106)    large left occipital brain mass and right homonymous hemianopsia     Assessment & Plan     Large left occipital brain mass: arge left occipital brain mass and right homonymous hemianopsia. He is on dexamethasone and Keprra. Plans for surgery if insurance is approved.   Blurry vision and right " homonymous hemianopsia  Transient ataxia of the right upper extremity, improved  Urinary tract infection: treated with ceftriaxone.   Hypertension with hypertensive emergency on admission: Better but still on Cardene drip.  Hypokalemia, resolved  Right bundle branch block - hyperdynamic EF with evidence of mild LVOT gradient but no obstruction.   Mild LVOT gradient secondary to hyperdynamic left ventricle - no obstruction.    The LVOT gradient is mildly elevated secondary to the hyperdynamic EF. There is no serious obstruction.  This should not affect him having surgery.     His blood pressure has historically been difficult to control.  He was hypokalemic when he was admitted, which always raises the question of possible hyperaldosteronism; losartan and spironolactone are prescribed in addition to amlodipine.    He is in sinus tachycardia now and remains on Cardene drip; I will start 25 mg metoprolol succinate.      This should not preclude him from getting his surgical procedure on Monday from a cardiovascular perspective.    JACQUELINE Parmar  [facility]  11/26/23  15:59 EST      Electronically signed by Verónica Izaguirre APRN at 11/26/23 1612       Esthela Randhawa APRN at 11/26/23 1239            [facility] NEUROSURGERY INTRACRANIAL PROGRESS NOTE    PATIENT IDENTIFICATION:   Name:  Chato Potts      MRN:  [id number]     63 y.o.  male               CC:brain mass       Subjective     Interval History:  Denies blurred vision. Denies headache.     ROS:  Constitutional: No fever, chills  HEENT: No headache, no vision changes, no tinnitus, no hearing difficulties  Neck: no stiffness  GI: No nausea, vomiting, no swallow difficulties  Neuro: No numbness, tingling, or weakness, no speech difficulties, no balance difficulties    Objective     Vital signs in last 24 hours:  Temp:  [97.8 °F (36.6 °C)-98.2 °F (36.8 °C)] 98.2 °F (36.8 °C)  Heart Rate:  [] 111  Resp:  [16-18]  18  BP: (131-166)/(74-97) 140/92      Intake/Output this shift:  I/O this shift:  In: -   Out: 550 [Urine:550]      Intake/Output last 3 shifts:  I/O last 3 completed shifts:  In: 240 [P.O.:240]  Out: 2800 [Urine:2800]    LABS:  Results from last 7 days   Lab Units 11/26/23 0447 11/25/23  0510 11/24/23  0535   WBC 10*3/mm3 12.23* 14.94* 12.74*   HEMOGLOBIN g/dL 14.4 15.5 14.2   HEMATOCRIT % 43.4 45.2 42.6   PLATELETS 10*3/mm3 218 216 199     Results from last 7 days   Lab Units 11/26/23 0447 11/25/23  0511 11/24/23  0535 11/23/23  0425 11/22/23  1129   SODIUM mmol/L 141 140 140   < > 139   POTASSIUM mmol/L 3.8 3.7 3.6   < > 3.3*   CHLORIDE mmol/L 108* 109* 109*   < > 105   CO2 mmol/L 22.0 18.6* 20.0*   < > 23.0   BUN mg/dL 21 17 17   < > 14   CREATININE mg/dL 0.78 0.79 0.75*   < > 1.03   CALCIUM mg/dL 8.4* 8.7 8.6   < > 8.9   BILIRUBIN mg/dL  --   --   --   --  0.8   ALK PHOS U/L  --   --   --   --  78   ALT (SGPT) U/L  --   --   --   --  21   AST (SGOT) U/L  --   --   --   --  19   GLUCOSE mg/dL 152* 147* 147*   < > 97    < > = values in this interval not displayed.        IMAGING STUDIES:  11/22/23 MRI Brain  Per MRI, There is a large calcified dural based mass left aspect of medial to left parietal and occipital lobes. The mass measures 7.6x5.8x8.2 cm and abuts the medial aspect of the superior portion of the left tentorial leaf. There is marked mass effect upon the medial aspect of the left parietal and occipital lobes with marked sulcal effacement as well as ventricular effacement of the posterior aspect of the left lateral ventricle. There is a shift of the midline to the right by approximately 8 mm. A small amount of vasogenic edema is seen within the left parietal and occipital lobes and this extends to the left temporal lobe. This lesion is likely representative of a large meningioma.     I personally viewed and interpreted the patient's chart.   Meds reviewed/changed: Yes  Rocephin 1000mg IV daily x 5  days  Norvasc 10mg daily   Decadron 4mg IV every 6 hours  Pepcid 20mg BID  Keppra 500mg BID  Cozaar 50mg daily   Aldactone 25mg daily   Cardene IV     Physical Exam:    General:   Awake, alert, oriented x3. Speech clear with no aphasia  Neck:    supple  CN II-XII:   Right homonymous hemianopsia   Motor:    Moving all extremities. No drift   Sensation:   Normal to light touch; no extinction  Station and Gait:  Up ad kashmir  Coordination:  Finger-to-nose and heel-to-shin test shows no dysmetria.        Assessment & Plan     ASSESSMENT:  Patient has large left occipital brain mass and right homonymous hemianopsia. Has been cleared per cardiology for surgery.     Is currently being treated for acute cystitis and is on IV Rocephin. He continues on IV Cardene for blood pressure.     He is NPO midnight for possible craniotomy +/- precrani embolization on Monday.       Hypertensive emergency    Brain mass    Vision disturbance    Hypokalemia    Brain compression    Brain edema    Acute cystitis without hematuria      PLAN:   Continue steroid and Keppra  Cardiology has cleared for surgery   Definitive surgical plans still pending     I discussed the patient's findings and my recommendations with patient and Dr. Odell.       LOS: 4 days       JACQUELINE Mari  2023  12:40 EST      Electronically signed by Esthela Randhawa APRN at 23 1256       Mikel Leonard DO at 23 0815              Name: Chato Potts ADMIT: 2023   : 1960  PCP: Provider, No Known    MRN: [id number] LOS: 4 days   AGE/SEX: 63 y.o. male  ROOM: [facility]     Subjective   Subjective   Patient seen and examined this morning. Hospital day 4. At time of my examination, patient is awake, alert and resting in bed.  He states that he feels well today, no acute complaints at present.    Objective   Objective   Vital Signs  Temp:  [97.8 °F (36.6 °C)-98.2 °F (36.8 °C)] 98.2 °F (36.8 °C)  Heart Rate:  [] 111  Resp:  [16-18] 18  BP:  (131-173)/(74-97) 140/92  SpO2:  [91 %-97 %] 93 %  on   ;   Device (Oxygen Therapy): room air  Body mass index is 28.75 kg/m².  Physical Exam  Vitals and nursing note reviewed.   Constitutional:       General: He is awake. He is not in acute distress.     Appearance: He is overweight.   Eyes:      General: No scleral icterus.     Conjunctiva/sclera: Conjunctivae normal.   Cardiovascular:      Rate and Rhythm: Regular rhythm. Tachycardia present.      Pulses: Normal pulses.      Heart sounds: Normal heart sounds.   Pulmonary:      Effort: Pulmonary effort is normal. No respiratory distress.      Breath sounds: Normal breath sounds. No wheezing.   Abdominal:      General: Bowel sounds are normal.      Palpations: Abdomen is soft.      Tenderness: There is no abdominal tenderness.   Skin:     General: Skin is warm and dry.   Neurological:      General: No focal deficit present.      Mental Status: He is alert.   Psychiatric:         Behavior: Behavior is cooperative.       Results Review     I reviewed the patient's new clinical results.  Results from last 7 days   Lab Units 11/26/23 0447 11/25/23  0510 11/24/23  0535 11/22/23  1129   WBC 10*3/mm3 12.23* 14.94* 12.74* 4.71   HEMOGLOBIN g/dL 14.4 15.5 14.2 14.8   PLATELETS 10*3/mm3 218 216 199 175     Results from last 7 days   Lab Units 11/26/23 0447 11/25/23  0511 11/24/23  0535 11/23/23  0425   SODIUM mmol/L 141 140 140 141   POTASSIUM mmol/L 3.8 3.7 3.6 4.2   CHLORIDE mmol/L 108* 109* 109* 108*   CO2 mmol/L 22.0 18.6* 20.0* 20.9*   BUN mg/dL 21 17 17 13   CREATININE mg/dL 0.78 0.79 0.75* 0.99   GLUCOSE mg/dL 152* 147* 147* 156*   EGFR mL/min/1.73 100.8 100.4 102.0 86.1     Results from last 7 days   Lab Units 11/23/23 0425 11/22/23  1129   ALBUMIN g/dL 4.4 4.7   BILIRUBIN mg/dL  --  0.8   ALK PHOS U/L  --  78   AST (SGOT) U/L  --  19   ALT (SGPT) U/L  --  21     Results from last 7 days   Lab Units 11/26/23  0447 11/25/23  0511 11/24/23  0535 11/23/23  0425  11/22/23  1129   CALCIUM mg/dL 8.4* 8.7 8.6 9.0 8.9   ALBUMIN g/dL  --   --   --  4.4 4.7   MAGNESIUM mg/dL  --   --   --   --  2.2   PHOSPHORUS mg/dL  --   --   --  2.0*  --        Hemoglobin A1C   Date/Time Value Ref Range Status   11/25/2023 0510 5.50 4.80 - 5.60 % Final       No radiology results for the last day    I have personally reviewed all medications:  Scheduled Medications  amLODIPine, 10 mg, Oral, Q24H  cefTRIAXone, 1,000 mg, Intravenous, Q24H  dexAMETHasone, 4 mg, Intravenous, Q6H  famotidine, 20 mg, Intravenous, Q12H  levETIRAcetam, 500 mg, Oral, BID  losartan, 50 mg, Oral, Q24H  spironolactone, 25 mg, Oral, Daily    Infusions  niCARdipine, 5-15 mg/hr, Last Rate: 5 mg/hr (11/26/23 0455)    Diet  Diet: Cardiac Diets; Healthy Heart (2-3 Na+); Texture: Regular Texture (IDDSI 7); Fluid Consistency: Thin (IDDSI 0)  NPO Diet NPO Type: Sips with Meds    I have personally reviewed:  [x]  Laboratory   [x]  Microbiology   [x]  Radiology   [x]  EKG/Telemetry  [x]  Cardiology/Vascular   []  Pathology    []  Records      Assessment/Plan     Active Hospital Problems    Diagnosis  POA    **Hypertensive emergency [I16.1]  Yes    Acute cystitis without hematuria [N30.00]  Yes    Brain mass [G93.89]  Yes    Vision disturbance [H53.9]  Yes    Hypokalemia [E87.6]  Yes    Brain compression [G93.5]  Yes    Brain edema [G93.6]  Yes      Resolved Hospital Problems   No resolved problems to display.       63 y.o. male admitted with Hypertensive emergency.    Hypertensive Emergency  - Patient noted to have SBP >200 and DBP >120 on arrival, coupled with papilledema. Findings consistent with hypertensive emergency. 2D Echo (11/23/23) showing Left ventricular systolic function is hyperdynamic (EF > 70%), LVOT pressure gradient 17 at rest and 32 with provocation, likely a result of hyperdynamic left ventricle. Also noting mild to moderate aortic regurgitation.  - He remains on Cardene drip at this time, however, blood pressure is  "significantly improved today after increases and amlodipine and losartan on 11/25.    - Cardiology consulted and following.  Will follow their recommendations regarding ongoing blood pressure management and findings of LVOT on echocardiogram.  - For now, continue Cardene, Amlodipine and Losartan as prescribed.     Brain Mass  - MRI brain obtained and noted \"large partially calcified dural-based mass with a broad base with respect to the left aspect of the falx cerebri medial to the left   parietal and occipital lobes\" with mass effect upon the medial aspect of the left parietal and occipital lobes with marked sulcal effacement and ventricular effacement of the posterior aspect of the lateral left ventricle, covered with 8 mm of midline shift to the right and small amount of vasogenic edema.  - Imaging findings coupled with patient's endorsement of visual field deficits and RUE ataxia. - CT C/A/P obtained, however, no evidence of findings concerning for malignant process.  - Neurosurgery consulted and following. He is on Decadron at present,  appreciate MALINDA recs, plans for craniotomy +/- prior embolization, he is to be NPO after midnight on Monday 11/27. Will continue to follow their plans/recommendations. Greatly appreciate their help.  - Keppra for seizure prophylaxis, Famotidine for GI prophylaxis.  - Closely monitor, fall/aspiration precautions, PT/OT.    Acute cystitis without hematuria  - Patient presenting with symptoms of dysuria coupled with UA findings showing 2+ leukocyte esterase, TNTC WBC, 2+ bacteria.  He is currently on ceftriaxone, urine culture showing >100K E. Coli, pan-sensitive.  - Continue empiric ceftriaxone as prescribed.  - Acute urinary retention protocol.    Leukocytosis  - WBC count elevated on most recent labs, however, improving from prior. Etiology of WBC elevation likely a result of infection + corticosteroids.   - Order repeat CBC in AM for reassessment.      Hyperglycemia  - Glucose " elevated on most recent labs. Patient without known history of T2DM.  Etiology likely a result of corticosteroids. Hemoglobin A1c 5.50% (23).   - Monitor for now, continue with POC glucose checks, can add correctional SSI if needed.    Hypokalemia, resolved  - Potassium noted to be low previously, repleted per electrolyte protocol and improved on most recent testing.      SCDs for DVT prophylaxis.  Full code.  Discussed with patient, family, nursing staff, and consulting provider.  Anticipate discharge  TBD  timing yet to be determined.      Mikle Leonard DO  [facility]  23          Electronically signed by Mikel Leonard DO at 23 0923       Mikel Leonard DO at 23 1000              Name: Chato Potts ADMIT: 2023   : 1960  PCP: Provider, No Known    MRN: [id number] LOS: 3 days   AGE/SEX: 63 y.o. male  ROOM: [facility]     Subjective   Subjective   Patient seen and examined this morning. Hospital day 3. At time of my examination, patient is awake, alert and resting in bed. Visual disturbances improved, patient without additional acute complaints at present. No acute events overnight.     Objective   Objective   Vital Signs  Temp:  [97.7 °F (36.5 °C)-98.1 °F (36.7 °C)] 97.7 °F (36.5 °C)  Heart Rate:  [] 96  Resp:  [16-18] 16  BP: (140-173)/() 173/89  SpO2:  [91 %-97 %] 97 %  on   ;   Device (Oxygen Therapy): room air  Body mass index is 28.75 kg/m².  Physical Exam  Vitals and nursing note reviewed.   Constitutional:       General: He is awake. He is not in acute distress.     Appearance: He is overweight.   Eyes:      General: No scleral icterus.     Conjunctiva/sclera: Conjunctivae normal.   Cardiovascular:      Rate and Rhythm: Normal rate and regular rhythm.      Pulses: Normal pulses.      Heart sounds: Normal heart sounds.   Pulmonary:      Effort: Pulmonary effort is normal. No respiratory distress.      Breath sounds: Normal breath sounds. No  wheezing.   Abdominal:      General: Bowel sounds are normal.      Palpations: Abdomen is soft.      Tenderness: There is no abdominal tenderness.   Skin:     General: Skin is warm and dry.   Neurological:      General: No focal deficit present.      Mental Status: He is alert.   Psychiatric:         Behavior: Behavior is cooperative.       Results Review     I reviewed the patient's new clinical results.  Results from last 7 days   Lab Units 11/25/23  0510 11/24/23  0535 11/22/23  1129   WBC 10*3/mm3 14.94* 12.74* 4.71   HEMOGLOBIN g/dL 15.5 14.2 14.8   PLATELETS 10*3/mm3 216 199 175     Results from last 7 days   Lab Units 11/25/23  0511 11/24/23  0535 11/23/23  0425 11/22/23  1129   SODIUM mmol/L 140 140 141 139   POTASSIUM mmol/L 3.7 3.6 4.2 3.3*   CHLORIDE mmol/L 109* 109* 108* 105   CO2 mmol/L 18.6* 20.0* 20.9* 23.0   BUN mg/dL 17 17 13 14   CREATININE mg/dL 0.79 0.75* 0.99 1.03   GLUCOSE mg/dL 147* 147* 156* 97   EGFR mL/min/1.73 100.4 102.0 86.1 82.1     Results from last 7 days   Lab Units 11/23/23  0425 11/22/23  1129   ALBUMIN g/dL 4.4 4.7   BILIRUBIN mg/dL  --  0.8   ALK PHOS U/L  --  78   AST (SGOT) U/L  --  19   ALT (SGPT) U/L  --  21     Results from last 7 days   Lab Units 11/25/23  0511 11/24/23  0535 11/23/23  0425 11/22/23  1129   CALCIUM mg/dL 8.7 8.6 9.0 8.9   ALBUMIN g/dL  --   --  4.4 4.7   MAGNESIUM mg/dL  --   --   --  2.2   PHOSPHORUS mg/dL  --   --  2.0*  --        Hemoglobin A1C   Date/Time Value Ref Range Status   11/25/2023 0510 5.50 4.80 - 5.60 % Final       No radiology results for the last day    I have personally reviewed all medications:  Scheduled Medications  amLODIPine, 5 mg, Oral, Q24H  cefTRIAXone, 1,000 mg, Intravenous, Q24H  dexAMETHasone, 4 mg, Intravenous, Q6H  famotidine, 20 mg, Intravenous, Q12H  levETIRAcetam, 500 mg, Oral, BID  losartan, 25 mg, Oral, Q24H    Infusions  niCARdipine, 5-15 mg/hr, Last Rate: 7.5 mg/hr (11/25/23 1235)    Diet  Diet: Cardiac Diets; Healthy  "Heart (2-3 Na+); Texture: Regular Texture (IDDSI 7); Fluid Consistency: Thin (IDDSI 0)  NPO Diet NPO Type: Sips with Meds    I have personally reviewed:  [x]  Laboratory   [x]  Microbiology   [x]  Radiology   [x]  EKG/Telemetry  [x]  Cardiology/Vascular   []  Pathology    []  Records      Assessment/Plan     Active Hospital Problems    Diagnosis  POA    **Hypertensive emergency [I16.1]  Yes    Acute cystitis without hematuria [N30.00]  Yes    Brain mass [G93.89]  Yes    Vision disturbance [H53.9]  Yes    Hypokalemia [E87.6]  Yes    Brain compression [G93.5]  Yes    Brain edema [G93.6]  Yes      Resolved Hospital Problems   No resolved problems to display.       63 y.o. male admitted with Hypertensive emergency.    Hypertensive Emergency  - Patient noted to have SBP >200 and DBP >120 on arrival, coupled with papilledema. Findings consistent with hypertensive emergency. 2D Echo (11/23/23) showing Left ventricular systolic function is hyperdynamic (EF > 70%), LVOT pressure gradient 17 at rest and 32 with provocation, likely a result of hyperdynamic left ventricle. Also noting mild to moderate aortic regurgitation.  - He remains on Cardene drip at this time, in addition to Norvasc and Losartan. Blood pressure remains elevated above goal on 160, have been unable turn drip off.  - Increase Amlodipine to 10 mg daily now (will give additional 5 mg to account for today). Increase Losartan to 50 mg daily.Continue Cardene drip, wean as tolerated.  - Spoke to Dr. Robles, about case, he is agreeable to consult and follow. Order placed, he will see patient in AM on 11/26.  - Appreciate neurology recs.     Brain Mass  - MRI brain obtained and noted \"large partially calcified dural-based mass with a broad base with respect to the left aspect of the falx cerebri medial to the left   parietal and occipital lobes\" with mass effect upon the medial aspect of the left parietal and occipital lobes with marked sulcal effacement and " ventricular effacement of the posterior aspect of the lateral left ventricle, covered with 8 mm of midline shift to the right and small amount of vasogenic edema.  - Imaging findings coupled with patient's endorsement of visual field deficits and RUE ataxia.  - Neurosurgery consulted and following. He is on Decadron at present,  appreciate MALINDA recs, plans for craniotomy +/- prior embolization depending on whether the tumor is primary or secondary-intervention planned early next week.  - CT C/A/P obtained, however, no evidence of findings concerning for malignant process.  - Keppra for seizure prophylaxis, Famotidine for GI prophylaxis.  - Closely monitor, fall/aspiration precautions, PT/OT, follow up Neurosurgery plans and recommendations.    Acute cystitis without hematuria  - Patient presenting with symptoms of dysuria coupled with UA findings showing 2+ leukocyte esterase, TNTC WBC, 2+ bacteria.  He is currently on ceftriaxone, urine culture showing >100K GNB, final speciation/sensitivities pending.   - Continue empiric ceftriaxone as prescribed for now while awaiting results of further workup.  - Acute urinary retention protocol.    Leukocytosis  - WBC count elevated on most recent labs. Etiology of WBC elevation likely a result of infection + corticosteroids.   - Order repeat CBC in AM for reassessment.      Hyperglycemia  - Glucose elevated on most recent labs. Patient without known history of T2DM.  Etiology likely a result of corticosteroids. Hemoglobin A1c 5.50% (11/25/23).   - Monitor for now, continue with POC glucose checks, can add correctional SSI if needed.    Hypokalemia, resolved  - Potassium noted to be low previously, repleted per electrolyte protocol and improved on most recent testing.      SCDs for DVT prophylaxis.  Full code.  Discussed with patient, family, and nursing staff.  Anticipate discharge  TBD  timing yet to be determined.      Miekl Leonard DO  [facility]  11/25/23          Electronically signed by Mikel Leonard DO at 11/25/23 1516       Jill Kim, JACQUELINE at 11/24/23 1114       Attestation signed by Chato Odell IV, MD at 11/24/23 1300    I have reviewed this documentation and agree.                    [facility] PROGRESS NOTE    PATIENT IDENTIFICATION:   Name:  Chato Potts      MRN:  [id number]     63 y.o.  male               CC: brain mass      Subjective     Interval History:  had CT C/A/P. Reports no blurred vision since admission. No PALACIO    ROS:  HEENT: No headache, no vision changes  Neuro: No numbness, tingling, or weakness, no speech difficulties, no balance difficulties    Objective     Vital signs in last 24 hours:  Temp:  [98.2 °F (36.8 °C)-98.4 °F (36.9 °C)] 98.4 °F (36.9 °C)  Heart Rate:  [] 94  Resp:  [18] 18  BP: (140-200)/() 154/99      Intake/Output this shift:  I/O this shift:  In: -   Out: 550 [Urine:550]      Intake/Output last 3 shifts:  I/O last 3 completed shifts:  In: 240 [P.O.:240]  Out: 2025 [Urine:2025]    LABS:  Results from last 7 days   Lab Units 11/24/23  0535 11/22/23  1129   WBC 10*3/mm3 12.74* 4.71   HEMOGLOBIN g/dL 14.2 14.8   HEMATOCRIT % 42.6 43.7   PLATELETS 10*3/mm3 199 175     Results from last 7 days   Lab Units 11/24/23  0535 11/23/23  0425 11/22/23  1129   SODIUM mmol/L 140 141 139   POTASSIUM mmol/L 3.6 4.2 3.3*   CHLORIDE mmol/L 109* 108* 105   CO2 mmol/L 20.0* 20.9* 23.0   BUN mg/dL 17 13 14   CREATININE mg/dL 0.75* 0.99 1.03   CALCIUM mg/dL 8.6 9.0 8.9   BILIRUBIN mg/dL  --   --  0.8   ALK PHOS U/L  --   --  78   ALT (SGPT) U/L  --   --  21   AST (SGOT) U/L  --   --  19   GLUCOSE mg/dL 147* 156* 97     Urine Culture 11/22-growth present, too young to evaluate     IMAGING STUDIES:  CT CHEST W CONTRAST DIAGNOSTIC-, CT ABDOMEN PELVIS W CONTRAST-     Radiation dose reduction techniques were utilized, including automated  exposure control and exposure modulation based on body  size.     Clinical: Brain mass, evaluate for primary source     FINDINGS:  1. Cardiac size within normal limits. Subtle diffuse wall thickening of  the distal one third of the esophagus perhaps related to esophagitis.  Caliber of the esophagus is normal. No mediastinal or hilar  mass/lymphadenopathy. Diameter of the ascending thoracic aorta upper  limits of normal at 3.8 cm. There is biapical pleural thickening scar  formation which is symmetric and benign-appearing. No pleural effusion,  active airspace disease, vascular congestion or suspicious pulmonary  lesion.     2. The liver, gallbladder, pancreas, spleen, adrenal glands and kidneys  are normal. No calculus or obstructive uropathy, no biliary duct  dilatation. Diameter of the aorta is within normal limits. The urinary  bladder is typical in appearance. Prostate and seminal vesicles  unremarkable        3. The stomach, appendix, colon and small bowel have a satisfactory  appearance. No induration of the mesentery to suggest an inflammatory  process. No wall thickening seen to suggest underlying neoplasm. No free  air or free intraperitoneal fluid. No lytic or sclerotic bone lesion  seen.     CONCLUSION: There is no indication of primary lesion involving the  thorax, abdomen or pelvis. No indication of metastasis. No adenopathy  seen.    I personally viewed and interpreted the patient's chart.    Meds reviewed/changed: Yes    Current Facility-Administered Medications:     acetaminophen (TYLENOL) tablet 650 mg, 650 mg, Oral, Q4H PRN, Jeremias Anglin MD    amLODIPine (NORVASC) tablet 5 mg, 5 mg, Oral, Q24H, Jeremias Anglin MD, 5 mg at 11/24/23 0903    cefTRIAXone (ROCEPHIN) 1,000 mg in sodium chloride 0.9 % 100 mL IVPB-VTB, 1,000 mg, Intravenous, Q24H, Jose Daniel Cardenas MD, Last Rate: 200 mL/hr at 11/24/23 0903, 1,000 mg at 11/24/23 0903    dexAMETHasone (DECADRON) injection 4 mg, 4 mg, Intravenous, Q6H, Jill Kim APRN, 4 mg at 11/24/23 0455    famotidine  (PEPCID) injection 20 mg, 20 mg, Intravenous, Q12H, Jill Kim APRN, 20 mg at 11/24/23 0903    influenza vac split quad (FLUZONE,FLUARIX,AFLURIA,FLULAVAL) injection 0.5 mL, 0.5 mL, Intramuscular, During Hospitalization, Jeremias Anglin MD    labetalol (NORMODYNE,TRANDATE) injection 10 mg, 10 mg, Intravenous, Q2H PRN, Jeremias Anglin MD    levETIRAcetam (KEPPRA) tablet 500 mg, 500 mg, Oral, BID, Jill Kim APRN, 500 mg at 11/24/23 0903    losartan (COZAAR) tablet 25 mg, 25 mg, Oral, Q24H, Jose Daniel Cardenas MD, 25 mg at 11/24/23 0903    melatonin tablet 3 mg, 3 mg, Oral, Nightly PRN, Jeremias Anglin MD    niCARdipine (CARDENE) 25 mg in 250 mL NS infusion kit, 5-15 mg/hr, Intravenous, Titrated, Jose Daniel Cardenas MD, Last Rate: 50 mL/hr at 11/24/23 0859, 5 mg/hr at 11/24/23 0859    nitroglycerin (NITROSTAT) SL tablet 0.4 mg, 0.4 mg, Sublingual, Q5 Min PRN, Jeremias Anglin MD    ondansetron (ZOFRAN) tablet 4 mg, 4 mg, Oral, Q6H PRN **OR** ondansetron (ZOFRAN) injection 4 mg, 4 mg, Intravenous, Q6H PRN, Jeremias Anglin MD      Physical Exam:    General:   Awake, alert, oriented x3. Speech clear with no aphasia  CN II-XII:   Right homonymous hemianopsia otherwise cranial nerves intact including partial right 3rd nerve palsy that was present the other day has resolved  Motor:    Moving all extremities.  No drift  Station and Gait:  Up ad [place].  Coordination:  Finger-to-nose test shows no dysmetria.      Assessment & Plan     ASSESSMENT:      Hypertensive emergency    Brain mass    Vision disturbance    Hypokalemia    Brain compression    Brain edema    Acute cystitis without hematuria    Patient with large left occipital brain mass and right homonymous hemianopsia.  Ataxia right upper extremity improved.  He reports no episodes of blurred vision.  CT chest abdomen pelvis unremarkable for malignancy.  Will likely need tumor extraction with precraniotomy embolization.  Definitive surgical plans still pending but  "will need to be n.p.o. midnight for possible surgery Monday.  Currently being treated for acute cystitis, on Rocephin.  Kindly request medical clearance from primary service for surgery or whether we need to hold off due to UTI    PLAN:   Continue steroid and Keppra  We will check in on  to ensure patient is medically clear for surgery, currently being treated for UTI.  N.p.o. midnight -possible craniotomy +/- precrani embolization    I discussed the patient's findings and my recommendations with patient and family    During patient visit, I utilized appropriate personal protective equipment including gloves.  Appropriate PPE was worn during the entire visit.  Hand hygiene was completed before and after.      LOS: 2 days       Jill Kim, APRN  2023  11:14 EST    \"Dictated utilizing Dragon dictation\".        Electronically signed by Chato Odell IV, MD at 23 1300       Mikel Leonard DO at 23 1030              Name: Chato LOUISE Katlyn ADMIT: 2023   : 1960  PCP: Provider, No Known    MRN: [id number] LOS: 2 days   AGE/SEX: 63 y.o. male  ROOM: [facility]/1     Subjective   Subjective   Patient seen and examined this morning. Hospital day 2. At time of my examination, patient is awake, alert and resting in bed. Visual disturbances noted previously have improved. At present, he denies chest pain, palpitations, dyspnea, dizziness, lightheadedness or other acute complaints. Discussed with nursing, he remains on Cardene drip at this time, have been unable to wean due to blood pressure readings.    Objective   Objective   Vital Signs  Temp:  [98.2 °F (36.8 °C)-98.4 °F (36.9 °C)] 98.4 °F (36.9 °C)  Heart Rate:  [] 94  Resp:  [18] 18  BP: (140-200)/() 154/99  SpO2:  [93 %-97 %] 96 %  on   ;   Device (Oxygen Therapy): room air  Body mass index is 28.86 kg/m².  Physical Exam  Vitals and nursing note reviewed.   Constitutional:       General: He is awake. He is not in acute " "distress.     Appearance: He is overweight.   HENT:      Head: Atraumatic.   Eyes:      General: No scleral icterus.     Conjunctiva/sclera: Conjunctivae normal.   Cardiovascular:      Rate and Rhythm: Normal rate and regular rhythm.      Pulses: Normal pulses.      Heart sounds: Normal heart sounds.   Pulmonary:      Effort: Pulmonary effort is normal. No respiratory distress.      Breath sounds: Normal breath sounds.   Abdominal:      General: Bowel sounds are normal.      Palpations: Abdomen is soft.      Tenderness: There is no abdominal tenderness.   Skin:     General: Skin is warm and dry.   Neurological:      General: No focal deficit present.      Mental Status: He is alert.   Psychiatric:         Behavior: Behavior is cooperative.       Results Review     I reviewed the patient's new clinical results.  Results from last 7 days   Lab Units 11/24/23  0535 11/22/23  1129   WBC 10*3/mm3 12.74* 4.71   HEMOGLOBIN g/dL 14.2 14.8   PLATELETS 10*3/mm3 199 175     Results from last 7 days   Lab Units 11/24/23  0535 11/23/23  0425 11/22/23  1129   SODIUM mmol/L 140 141 139   POTASSIUM mmol/L 3.6 4.2 3.3*   CHLORIDE mmol/L 109* 108* 105   CO2 mmol/L 20.0* 20.9* 23.0   BUN mg/dL 17 13 14   CREATININE mg/dL 0.75* 0.99 1.03   GLUCOSE mg/dL 147* 156* 97   EGFR mL/min/1.73 102.0 86.1 82.1     Results from last 7 days   Lab Units 11/23/23  0425 11/22/23  1129   ALBUMIN g/dL 4.4 4.7   BILIRUBIN mg/dL  --  0.8   ALK PHOS U/L  --  78   AST (SGOT) U/L  --  19   ALT (SGPT) U/L  --  21     Results from last 7 days   Lab Units 11/24/23  0535 11/23/23  0425 11/22/23  1129   CALCIUM mg/dL 8.6 9.0 8.9   ALBUMIN g/dL  --  4.4 4.7   MAGNESIUM mg/dL  --   --  2.2   PHOSPHORUS mg/dL  --  2.0*  --        No results found for: \"HGBA1C\", \"POCGLU\"    MRI Brain With & Without Contrast    Result Date: 11/22/2023   There is a large partially calcified dural-based mass with a broad base with respect to the left aspect of the falx cerebri medial " to the left parietal and occipital lobes. The mass measures up to 7.6 x 5.8 x 8.2 cm in greatest dimensions and abuts the medial aspect of the superior portion of the left tentorial leaf with which it may have an attachment as well. The falx cerebri is bowed to the right and a small component along the right aspect of the falx cerebri medial to the right occipital lobe cannot be entirely excluded. There is marked mass effect upon the medial aspect of the left parietal and occipital lobes with marked sulcal effacement as well as ventricular effacement of the posterior aspect of the left lateral ventricle. There is shift of the midline structures to the right by approximately 8 mm. A small amount of vasogenic edema is seen within the left parietal and occipital lobes and this extends into the posterior aspect of the left temporal lobe. There is herniation of a small focus of the posterior and medial portion of the left temporal lobe through the tentorial incisura. Again, this lesion is most likely representative of a large meningioma.  This report was finalized on 11/22/2023 4:41 PM by Dr. Trenton Feliciano M.D on Workstation: [id number]      CT Head Without Contrast    Result Date: 11/22/2023  1. Large left parieto-occipital supratentorial hyperdense and partially calcified mass as discussed above. 2. This mass could conceivably represent a meningioma posterior arising from the tentorium or falx but further evaluation recommended with MRI of the brain with contrast. 3. Also correlation to any prior outside studies would be helpful. 4. Findings were discussed with the ER physician.  Radiation dose reduction techniques were utilized, including automated exposure control and exposure modulation based on body size.        I have personally reviewed all medications:  Scheduled Medications  amLODIPine, 5 mg, Oral, Q24H  cefTRIAXone, 1,000 mg, Intravenous, Q24H  dexAMETHasone, 4 mg, Intravenous, Q6H  famotidine, 20 mg,  "Intravenous, Q12H  levETIRAcetam, 500 mg, Oral, BID  losartan, 25 mg, Oral, Q24H    Infusions  niCARdipine, 5-15 mg/hr, Last Rate: 5 mg/hr (11/24/23 0859)    Diet  Diet: Cardiac Diets; Healthy Heart (2-3 Na+); Texture: Regular Texture (IDDSI 7); Fluid Consistency: Thin (IDDSI 0)  NPO Diet NPO Type: Sips with Meds    I have personally reviewed:  [x]  Laboratory   [x]  Microbiology   [x]  Radiology   [x]  EKG/Telemetry  [x]  Cardiology/Vascular   []  Pathology    []  Records      Assessment/Plan     Active Hospital Problems    Diagnosis  POA    **Hypertensive emergency [I16.1]  Yes    Acute cystitis without hematuria [N30.00]  Yes    Brain mass [G93.89]  Yes    Vision disturbance [H53.9]  Yes    Hypokalemia [E87.6]  Yes    Brain compression [G93.5]  Yes    Brain edema [G93.6]  Yes      Resolved Hospital Problems   No resolved problems to display.       63 y.o. male admitted with Hypertensive emergency.    Hypertensive Emergency  - Patient noted to have SBP >200 and DBP >120 on arrival, coupled with papilledema. Findings consistent with hypertensive emergency. 2D Echo (11/23/23) showing Left ventricular systolic function is hyperdynamic (EF > 70%), LVOT pressure gradient 17 at rest and 32 with provocation, likely a result of hyperdynamic left ventricle. Also noting mild to moderate aortic regurgitation.  - He remains on Cardene drip at this time, in addition to Norvasc and Losartan. Most recent BP reading have SBP <160, discussed with nursing, going to attempt to wean drip at this time and see if BP will remain stable.  - Appreciate neurology recs.     Brain Mass  - MRI brain obtained and noted \"large partially calcified dural-based mass with a broad base with respect to the left aspect of the falx cerebri medial to the left   parietal and occipital lobes\" with mass effect upon the medial aspect of the left parietal and occipital lobes with marked sulcal effacement and ventricular effacement of the posterior aspect of " the lateral left ventricle, covered with 8 mm of midline shift to the right and small amount of vasogenic edema.  - Imaging findings coupled with patient's endorsement of visual field deficits and RUE ataxia.  - Neurosurgery consulted and following. He is on Decadron at present,  appreciate MALINDA recs, plans for craniotomy +/- prior embolization depending on whether the tumor is primary or secondary-intervention planned early next week.  - CT C/A/P obtained, however, no evidence of findings concerning for malignant process.  - Keppra for seizure prophylaxis, Famotidine for GI prophylaxis.  - Closely monitor, fall/aspiration precautions, PT/OT, follow up Neurosurgery plans and recommendations.    Acute cystitis without hematuria  - Patient presenting with symptoms of dysuria coupled with UA findings showing 2+ leukocyte esterase, TNTC WBC, 2+ bacteria.  He is currently on ceftriaxone, urine culture pending.  - Continue empiric ceftriaxone as prescribed for now while awaiting results of further workup.  - Acute urinary retention protocol.    Leukocytosis  - WBC count elevated on most recent labs. Etiology of WBC elevation likely a result of infection + corticosteroids.   - Order repeat CBC in AM for reassessment.      Hyperglycemia  - Glucose elevated on most recent labs. Patient without known history of T2DM.  Etiology likely a result of corticosteroids, no prior A1c available for review.  - Order repeat A1c at this time.  - Monitor for now, continue with POC glucose checks, can add correctional SSI if needed.    Hypokalemia, resolved  - Potassium noted to be low previously, repleted per electrolyte protocol and improved on most recent testing.      SCDs for DVT prophylaxis.  Full code.  Discussed with patient, family, and nursing staff.  Anticipate discharge  TBD  timing yet to be determined.      Mikel Leonard DO  [facility]  11/24/23          Electronically signed by Mikel Leonard DO at  23 1200          Consult Notes (last 72 hours)        Amol Rboles MD at 23 2302        Consult Orders    1. Inpatient Cardiology Consult [[id number]] ordered by Mikel Leonard DO at 23 1515                 Date of Consultation: 23    Referral Provider: Dr. Leonard     Reason for Consultation: Hypertension.    Encounter Provider: Amol Robles MD    Group of Service: [facility]     Patient Name: Chato Potts    :1960    Chief complaint: Blurred vision.    History of Present Illness:      This is a very pleasant 63 year-old male with a history of hypertension.  The patient presented to the ER on 2023 with blurry vision intermittently for approximately 3 to 4 weeks.  He had been diagnosed with hypertension before this, but had not had any effects.  He states that his blood pressure rarely runs below 170 at baseline but he had never felt poorly previously.    Unfortunately, he was found to have a large brain mass on imaging.  An MRI of the brain confirmed this medial to the left parietal and occipital lobes, and likely represents a large meningioma.  There are plans for potential craniotomy on 2023  As there is concern about compression.  His blood pressure has been difficult to control, and he is required a Cardene drip.  He is currently on amlodipine 10 mg/day, and his losartan was increased today.  He has also been on Decadron, which may be driving up his blood pressure.  He had a right bundle branch block on his EKG, but no chest pain.  An echocardiogram on 2023 showed a hyperdynamic ejection fraction of greater than 70% and mild LVOT gradients of 17 mmHg at rest and 32 mmHg with Valsalva.  There was no significant valvular disease.  The patient denied any coronary artery disease history.    History reviewed. No pertinent past medical history.      Past Surgical History:   Procedure Laterality Date    TONSILLECTOMY           No Known  "Allergies      No current facility-administered medications on file prior to encounter.     No current outpatient medications on file prior to encounter.         Social History     Socioeconomic History    Marital status: Single   Tobacco Use    Smoking status: Never     Passive exposure: Never    Smokeless tobacco: Never   Vaping Use    Vaping Use: Never used   Substance and Sexual Activity    Alcohol use: Never    Drug use: Never    Sexual activity: Defer         History reviewed. No pertinent family history.    REVIEW OF SYSTEMS:   Pertinent positives are noted in the HPI above.  Otherwise, all other systems were reviewed, and are negative.     Objective:     Vitals:    11/25/23 1550 11/25/23 1625 11/25/23 1727 11/25/23 2015   BP: 162/97 154/85 166/97 131/74   BP Location: Left arm Left arm  Left arm   Patient Position: Lying Lying  Lying   Pulse:    97   Resp:    16   Temp:    97.8 °F (36.6 °C)   TempSrc:    Oral   SpO2:    91%   Weight:       Height:         Body mass index is 28.75 kg/m².  Flowsheet Rows      Flowsheet Row First Filed Value   Admission Height 188 cm (74\") Documented at 11/22/2023 1033   Admission Weight 102 kg (225 lb) Documented at 11/22/2023 1033             General:    No acute distress, alert and oriented x4, pleasant                   Head:    Normocephalic, atraumatic.   Eyes:          Conjunctivae and sclerae normal, no icterus.   Throat:   No oral lesions, no thrush, oral mucosa moist.    Neck:   Supple, trachea midline.   Lungs:     Clear to auscultation bilaterally     Heart:    Regular rhythm and normal rate.  No murmurs, gallops, or rubs noted.   Abdomen:     Soft, non-tender, non-distended, positive bowel sounds.    Extremities:   No clubbing, cyanosis, or edema.     Pulses:   Pulses palpable and equal bilaterally.    Skin:   No bleeding or rash.   Neuro:   Non-focal.     Psychiatric:   Normal mood and affect.         Lab Review:                Results from last 7 days   Lab Units " 11/25/23  0511   SODIUM mmol/L 140   POTASSIUM mmol/L 3.7   CHLORIDE mmol/L 109*   CO2 mmol/L 18.6*   BUN mg/dL 17   CREATININE mg/dL 0.79   GLUCOSE mg/dL 147*   CALCIUM mg/dL 8.7     Results from last 7 days   Lab Units 11/22/23  1129   HSTROP T ng/L 8     Results from last 7 days   Lab Units 11/25/23  0510   WBC 10*3/mm3 14.94*   HEMOGLOBIN g/dL 15.5   HEMATOCRIT % 45.2   PLATELETS 10*3/mm3 216     Results from last 7 days   Lab Units 11/22/23  1129   INR  0.94   APTT seconds 29.8         Results from last 7 days   Lab Units 11/22/23  1129   MAGNESIUM mg/dL 2.2           EKG (reviewed by me personally): Normal sinus rhythm, right bundle branch block.      Assessment:   1.  Large left occipital brain mass, possible large meningioma  2.  Blurry vision and right homonymous hemianopsia  3.  Transient ataxia of the right upper extremity, improved  4.  Urinary tract infection, being treated  5.  Hypertension with hypertensive emergency on admission  6.  Hypokalemia, resolved  7.  Right bundle branch block  8.  Mild LVOT gradient secondary to hyperdynamic left ventricle    Plan:       I reviewed the echocardiogram personally.  He has a hyperdynamic ejection fraction with normal wall motion.  The LVOT gradient is mildly elevated secondary to the hyperdynamic EF.  There is no serious obstruction.  This should not affect him at surgery.    His blood pressure evidently has been quite difficult to control for some time.  He stopped taking his outpatient medications, but has felt fine up until these events.  He told me that it was difficult even with medications to get his systolic below 170 mmHg at times.  He was hypokalemic when he was admitted, which always raises the question of possible hyperaldosteronism.  He is already on losartan which will skew the assay.  I started him on spironolactone 25 mg/day to begin tomorrow morning.  His losartan was increased to 50 mg/day today, and his amlodipine is maximized to 10 mg/day.   I would keep him on the Cardene drip if needed around the time of surgery as well.  This should not preclude him from getting his surgical procedure on Monday from a cardiovascular perspective.    Thank you very much for this consult.    Gilbert Robles MD       Electronically signed by Amol Robles MD at 11/25/23 5759

## 2023-11-27 NOTE — PROGRESS NOTES
Name: Chato Potts ADMIT: 2023   : 1960  PCP: Provider, No Known    MRN: [id number] LOS: 5 days   AGE/SEX: 63 y.o. male  ROOM: [facility]     Subjective   Subjective   Patient seen and examined this morning. Hospital day 5. At time of my examination, patient is awake, alert and resting in bed.  Blood pressure improved.     Objective   Objective   Vital Signs  Temp:  [98.1 °F (36.7 °C)-98.8 °F (37.1 °C)] 98.8 °F (37.1 °C)  Heart Rate:  [] 101  Resp:  [18] 18  BP: (110-173)/() 140/78  SpO2:  [86 %-95 %] 92 %  on   ;   Device (Oxygen Therapy): room air  Body mass index is 30.05 kg/m².  Physical Exam  Vitals and nursing note reviewed.   Constitutional:       General: He is awake. He is not in acute distress.     Appearance: He is overweight. He is obese.   Eyes:      General: No scleral icterus.     Conjunctiva/sclera: Conjunctivae normal.   Cardiovascular:      Rate and Rhythm: Normal rate and regular rhythm.      Pulses: Normal pulses.      Heart sounds: Normal heart sounds.   Pulmonary:      Effort: Pulmonary effort is normal. No respiratory distress.      Breath sounds: Normal breath sounds.   Abdominal:      General: Bowel sounds are normal.      Palpations: Abdomen is soft.      Tenderness: There is no abdominal tenderness.   Skin:     General: Skin is warm and dry.   Neurological:      General: No focal deficit present.      Mental Status: He is alert.   Psychiatric:         Behavior: Behavior is cooperative.       Results Review     I reviewed the patient's new clinical results.  Results from last 7 days   Lab Units 23  0549 23  0447 23  0510 23  0535   WBC 10*3/mm3 13.71* 12.23* 14.94* 12.74*   HEMOGLOBIN g/dL 15.6 14.4 15.5 14.2   PLATELETS 10*3/mm3 263 218 216 199     Results from last 7 days   Lab Units 23  0549 23  0447 23  0511 23  0535   SODIUM mmol/L 140 141 140 140   POTASSIUM mmol/L 3.5 3.8 3.7 3.6   CHLORIDE mmol/L 108* 108* 109*  109*   CO2 mmol/L 19.5* 22.0 18.6* 20.0*   BUN mg/dL 19 21 17 17   CREATININE mg/dL 0.86 0.78 0.79 0.75*   GLUCOSE mg/dL 141* 152* 147* 147*   EGFR mL/min/1.73 97.9 100.8 100.4 102.0     Results from last 7 days   Lab Units 11/23/23  0425 11/22/23  1129   ALBUMIN g/dL 4.4 4.7   BILIRUBIN mg/dL  --  0.8   ALK PHOS U/L  --  78   AST (SGOT) U/L  --  19   ALT (SGPT) U/L  --  21     Results from last 7 days   Lab Units 11/27/23  0549 11/26/23  0447 11/25/23  0511 11/24/23  0535 11/23/23  0425 11/22/23  1129   CALCIUM mg/dL 8.3* 8.4* 8.7 8.6 9.0 8.9   ALBUMIN g/dL  --   --   --   --  4.4 4.7   MAGNESIUM mg/dL  --   --   --   --   --  2.2   PHOSPHORUS mg/dL  --   --   --   --  2.0*  --        Hemoglobin A1C   Date/Time Value Ref Range Status   11/25/2023 0510 5.50 4.80 - 5.60 % Final       No radiology results for the last day    I have personally reviewed all medications:  Scheduled Medications  amLODIPine, 10 mg, Oral, Q24H  dexAMETHasone, 4 mg, Intravenous, Q6H  famotidine, 20 mg, Intravenous, Q12H  levETIRAcetam, 500 mg, Oral, BID  losartan, 50 mg, Oral, Q24H  metoprolol succinate XL, 25 mg, Oral, Q24H  spironolactone, 25 mg, Oral, Daily    Infusions     Diet  Diet: Cardiac Diets; Healthy Heart (2-3 Na+); Texture: Regular Texture (IDDSI 7); Fluid Consistency: Thin (IDDSI 0)    I have personally reviewed:  [x]  Laboratory   [x]  Microbiology   [x]  Radiology   [x]  EKG/Telemetry  [x]  Cardiology/Vascular   []  Pathology    []  Records       Assessment/Plan     Active Hospital Problems    Diagnosis  POA    **Hypertensive emergency [I16.1]  Yes    Acute cystitis without hematuria [N30.00]  Yes    Brain mass [G93.89]  Yes    Vision disturbance [H53.9]  Yes    Hypokalemia [E87.6]  Yes    Brain compression [G93.5]  Yes    Brain edema [G93.6]  Yes      Resolved Hospital Problems   No resolved problems to display.       63 y.o. male admitted with Hypertensive emergency.    Hypertensive Emergency  - Patient noted to have SBP  ">200 and DBP >120 on arrival, coupled with papilledema. Findings consistent with hypertensive emergency. 2D Echo (11/23/23) showing Left ventricular systolic function is hyperdynamic (EF > 70%), LVOT pressure gradient 17 at rest and 32 with provocation, likely a result of hyperdynamic left ventricle. Also noting mild to moderate aortic regurgitation.  - He remains on Cardene drip at this time, however, blood pressure is significantly improved overall after increases and amlodipine and losartan on 11/25.  Cardiology consulted and following.  They added Metoprolol Succinate and Spironolactone. Appreciate their help.  - Stop Cardene drip. Continue Amlodipine, Losartan, metoprolol succinate and spironolactone as prescribed and monitor.  - For now, continue Cardene, Amlodipine and Losartan as prescribed.     Brain Mass  - MRI brain obtained and noted \"large partially calcified dural-based mass with a broad base with respect to the left aspect of the falx cerebri medial to the left   parietal and occipital lobes\" with mass effect upon the medial aspect of the left parietal and occipital lobes with marked sulcal effacement and ventricular effacement of the posterior aspect of the lateral left ventricle, covered with 8 mm of midline shift to the right and small amount of vasogenic edema.  - Imaging findings coupled with patient's endorsement of visual field deficits and RUE ataxia. - CT C/A/P obtained, however, no evidence of findings concerning for malignant process.  - Neurosurgery consulted and following. He is on Decadron at present,  appreciate MALINDA recs. There was discussion yesterday regarding that patient is out of network and decision regarding plans for surgical intervention here versus outpatient follow-up ongoing, neurosurgery to reassess today and you have recommendations to assist with planning moving forward.  - Keppra for seizure prophylaxis, Famotidine for GI prophylaxis.  - Closely monitor, fall/aspiration " precautions, PT/OT.    Acute cystitis without hematuria  - Patient presenting with symptoms of dysuria coupled with UA findings showing 2+ leukocyte esterase, TNTC WBC, 2+ bacteria.  He is currently on ceftriaxone, urine culture showing >100K E. Coli, pan-sensitive.  - Continue empiric ceftriaxone as prescribed.  - Acute urinary retention protocol.    Leukocytosis  - WBC count elevated on most recent labs, however, improving from prior. Etiology of WBC elevation likely a result of infection + corticosteroids.   - Order repeat CBC in AM for reassessment.      Hyperglycemia  - Glucose elevated on most recent labs. Patient without known history of T2DM.  Etiology likely a result of corticosteroids. Hemoglobin A1c 5.50% (11/25/23).   - Monitor for now, continue with POC glucose checks, can add correctional SSI if needed.    Hypokalemia, resolved  - Potassium noted to be low previously, repleted per electrolyte protocol and improved on most recent testing.      SCDs for DVT prophylaxis.  Full code.  Discussed with patient, family, nursing staff, and consulting provider.  Anticipate discharge home timing yet to be determined.      Mikel Leonard DO  [facility]  11/27/23

## 2023-11-27 NOTE — CASE MANAGEMENT/SOCIAL WORK
Continued Stay Note  [facility]     Patient Name: Chato Potts  MRN: [id number]  Today's Date: 11/27/2023    Admit Date: 11/22/2023       Discharge Plan       Row Name 11/27/23 1209       Plan    Plan Comments Late entry for Friday 11/24/23: Discussed w/ pt that [facility] is out of network with his insurance.   Notice of noncoverage form reviewed w/ pt and signed by pt.  Copy given.  Pt encouraged to call the customer service number on the back of his insurance card to discuss with them.  He chose to stay at [facility] [place] by [facility] for now and discuss w/ his physicians. ..........Margaret ROBERTO/ ABDULLAHI                   Discharge Codes    No documentation.                 Expected Discharge Date and Time       Expected Discharge Date Expected Discharge Time    Nov 30, 2023               Margaret Bean RN

## 2023-11-27 NOTE — DISCHARGE INSTRUCTIONS
Follow up with [facility] 11/28/23  Dr. Sosa  on Tuesday11/28/23 at 09:30 am  [facility]  [address], [place]  [address]

## 2023-11-27 NOTE — PROGRESS NOTES
[facility] NEUROSURGERY INTRACRANIAL PROGRESS NOTE    PATIENT IDENTIFICATION:   Name:  Chato Potts      MRN:  [id number]     63 y.o.  male               CC:brain mass       Subjective     Interval History:  Denies blurred vision. Denies headache.right side vision loss stable and unchanged.    ROS:  Constitutional: No fever, chills  HEENT: No headache, no vision changes, no tinnitus, no hearing difficulties  Neck: no stiffness  GI: No nausea, vomiting, no swallow difficulties  Neuro: No numbness, tingling, or weakness, no speech difficulties, no balance difficulties    Objective     Vital signs in last 24 hours:  Temp:  [98.1 °F (36.7 °C)-98.8 °F (37.1 °C)] 98.8 °F (37.1 °C)  Heart Rate:  [] 101  Resp:  [18] 18  BP: (110-173)/() 140/78      Intake/Output this shift:  I/O this shift:  In: -   Out: 400 [Urine:400]      Intake/Output last 3 shifts:  I/O last 3 completed shifts:  In: 1167 [P.O.:240; I.V.:927]  Out: 4040 [Urine:4040]    LABS:  Results from last 7 days   Lab Units 11/27/23  0549 11/26/23 0447 11/25/23  0510   WBC 10*3/mm3 13.71* 12.23* 14.94*   HEMOGLOBIN g/dL 15.6 14.4 15.5   HEMATOCRIT % 47.0 43.4 45.2   PLATELETS 10*3/mm3 263 218 216     Results from last 7 days   Lab Units 11/27/23  0549 11/26/23 0447 11/25/23  0511 11/23/23  0425 11/22/23  1129   SODIUM mmol/L 140 141 140   < > 139   POTASSIUM mmol/L 3.5 3.8 3.7   < > 3.3*   CHLORIDE mmol/L 108* 108* 109*   < > 105   CO2 mmol/L 19.5* 22.0 18.6*   < > 23.0   BUN mg/dL 19 21 17   < > 14   CREATININE mg/dL 0.86 0.78 0.79   < > 1.03   CALCIUM mg/dL 8.3* 8.4* 8.7   < > 8.9   BILIRUBIN mg/dL  --   --   --   --  0.8   ALK PHOS U/L  --   --   --   --  78   ALT (SGPT) U/L  --   --   --   --  21   AST (SGOT) U/L  --   --   --   --  19   GLUCOSE mg/dL 141* 152* 147*   < > 97    < > = values in this interval not displayed.        IMAGING STUDIES:  No new neuroimaging      I personally viewed and interpreted the patient's labs and chart.        Physical Exam:    General:   Awake, alert, oriented x3. Speech clear with no aphasia  Neck:    supple  CN II-XII:   Right homonymous hemianopsia   Motor:    Moving all extremities.  Strength appears to be intact and equal in all extremities.  No drift in upper or lower extremities  Sensation:   Normal to light touch; no extinction  Station and Gait:  Up ad kashmir  Coordination:  Very mild right upper extremity ataxia.      Assessment & Plan     ASSESSMENT:  Patient admitted for blurry vision and malignant hypertension with finding of large left occipital brain mass and right homonymous hemianopsia.  He is just recently come off Cardene drip and has become more stabilized.  From a neurosurgical perspective, he would be fine to go home and present next week for embolization procedure and crani for removal of tumor.  Given that the patient is out of network, the patient feels he would rather follow-up with in network [facility] physicians.  We will facilitate follow-up with neurosurgeon, Dr. Cotton.  Our office is made contact with [facility] neurosurgery and we are attempting to get follow-up scheduled.  We are waiting to hear back from [facility].  At this time neurosurgery will sign off and the patient can follow-up with us if needed.  He is fine for a diet.        Hypertensive emergency    Brain mass    Vision disturbance    Hypokalemia    Brain compression    Brain edema    Acute cystitis without hematuria      PLAN:     Continue steroid and Keppra  Patient plans to follow-up with [facility] neurosurgery  Waiting to hear back from [facility] neurosurgery on office appointment time/date      I discussed the patient's findings and my recommendations with patient and Dr. Rodriguez.       LOS: 5 days       Ricardo Torrez, APRN  11/27/2023  13:16 EST

## 2023-11-27 NOTE — DISCHARGE SUMMARY
Patient Name: Chato Potts  : 1960  MRN: [id number]    Date of Admission: 2023  Date of Discharge:  2023  Primary Care Physician: Provider, No Known      Chief Complaint:   Blurred Vision      Discharge Diagnoses     Active Hospital Problems    Diagnosis  POA    **Hypertensive emergency [I16.1]  Yes    Acute cystitis without hematuria [N30.00]  Yes    Brain mass [G93.89]  Yes    Vision disturbance [H53.9]  Yes    Hypokalemia [E87.6]  Yes    Brain compression [G93.5]  Yes    Brain edema [G93.6]  Yes      Resolved Hospital Problems   No resolved problems to display.        Hospital Course     Mr. Potts is a 63 y.o. male who presented to [facility] initially complaining of altered vision.  Please see the admitting history and physical for further details.  He was admitted to the hospital for further evaluation and treatment.      Hypertensive Emergency  - Patient noted to have SBP >200 and DBP >120 on arrival, coupled with papilledema. Findings consistent with hypertensive emergency. 2D Echo (23) showing Left ventricular systolic function is hyperdynamic (EF > 70%), LVOT pressure gradient 17 at rest and 32 with provocation, likely a result of hyperdynamic left ventricle. Also noting mild to moderate aortic regurgitation. He was treated with Cardene drip, in addition to starting oral medication therapy, which led to improvement in blood pressure, eventually allowing for Cardene drip to be discontinued. Cardiology was consulted and evaluated, saw patient prior to discharge and cleared him for discharge from their perspective with recommendation of short PCP follow up appointment. Discharge medication plan: Amlodipine, Losartan, Metoprolol and Spironolactone as prescribed (see orders). Recommend that patient check his blood pressure 2-3 times daily and record those values in log book and take with him to next PCP visit to allow for greater insight into treatment efficacy to guide  "further management decisions. Recommend heart healthy/low sodium diet. Recommend close follow up with PCP within 1 week after discharge for reassessment to guide ongoing management decisions.    Brain Mass  - MRI brain obtained and noted \"large partially calcified dural-based mass with a broad base with respect to the left aspect of the falx cerebri medial to the left   parietal and occipital lobes\" with mass effect upon the medial aspect of the left parietal and occipital lobes with marked sulcal effacement and ventricular effacement of the posterior aspect of the lateral left ventricle, covered with 8 mm of midline shift to the right and small amount of vasogenic edema. Imaging findings coupled with patient's endorsement of visual field deficits and RUE ataxia. - CT C/A/P obtained, however, no evidence of findings concerning for malignant process. Neurosurgery consulted and followed. He was treated with Decadron.There was discussion yesterday regarding that patient is out of network and decision regarding plans for surgical intervention here versus outpatient follow-up discussed with Neurosurgery, they decided that patient should be discharged and follow up with Neurosurgery service at [facility]. They arranged for patient to have outpatient appointment with [facility] Neurosurgery service on 11/28 at 0930. Discharge medication plan: Dexamethasone 4 mg every 8 hours, will provide 5-day course, discussed with our neurosurgery team, dexamethasone as above prescribed per their recommendations, they state that [facility] neurosurgery will assume management of steroids after discharge. Continue treatment with Keppra for seizure prophylaxis and H2 blocker for GI prophylaxis, as patient was receiving in hospital. Follow up with Neurosurgery as scheduled.    Acute cystitis without hematuria  - Patient presenting with symptoms of dysuria coupled with UA findings showing 2+ leukocyte esterase, TNTC WBC, 2+ bacteria.  He is currently on " ceftriaxone, urine culture showing >100K E. Coli, pan-sensitive. Completed treatment with Ceftriaxone prior to discharge. Recommend close follow up with PCP within 1 week after discharge for reassessment to guide ongoing management decisions.     Leukocytosis  - WBC count elevated on most recent labs, however, improved overall. Etiology of WBC elevation likely a result of infection + corticosteroids. Recommend repeat CBC with PCP within 1 week for reassessment.    Hyperglycemia  - Glucose elevated on  labs intermittently. Patient without known history of T2DM.  Etiology likely a result of corticosteroids. Hemoglobin A1c 5.50% (11/25/23). Recommend that patient check his blood glucose 2-3 times daily and record those values in log book and take with him to next PCP visit to allow for greater insight into treatment efficacy to guide further management decisions.     Hypokalemia, resolved  - Potassium noted to be low previously, repleted per electrolyte protocol and improved on most recent testing. Recommend repeat level check with PCP within 1 week for reassessment.      Day of Discharge     Subjective:  Blood pressure improved and stable without Cardene drip, evaluated by cardiology and cleared for discharge from their perspective.  Evaluated by neurosurgery, they have arranged for patient to have outpatient follow-up with neurosurgery at [facility] tomorrow morning at 09 30.    Physical Exam:  Temp:  [98.1 °F (36.7 °C)-98.8 °F (37.1 °C)] 98.1 °F (36.7 °C)  Heart Rate:  [] 105  Resp:  [18] 18  BP: (110-173)/() 154/85  Body mass index is 30.05 kg/m².  Physical Exam  Vitals and nursing note reviewed.   Constitutional:       General: He is awake. He is not in acute distress.     Appearance: He is overweight. He is obese.   Eyes:      General: No scleral icterus.     Conjunctiva/sclera: Conjunctivae normal.   Cardiovascular:      Rate and Rhythm: Normal rate and regular rhythm.      Pulses: Normal pulses.       Heart sounds: Normal heart sounds.   Pulmonary:      Effort: Pulmonary effort is normal. No respiratory distress.      Breath sounds: Normal breath sounds.   Abdominal:      General: Bowel sounds are normal.      Palpations: Abdomen is soft.      Tenderness: There is no abdominal tenderness.   Skin:     General: Skin is warm and dry.   Neurological:      General: No focal deficit present.      Mental Status: He is alert.   Psychiatric:         Behavior: Behavior is cooperative.         Consultants     Consult Orders (all) (From admission, onward)       Start     Ordered    11/25/23 1516  Inpatient Cardiology Consult  Once        Comments: Spoke to Dr. Robles he is going to see patient in AM on 11/26.   Specialty:  Cardiology  Provider:  Amol Robles MD    11/25/23 1515    11/22/23 1722  Inpatient Case Management  Consult  Once        Provider:  (Not yet assigned)    11/22/23 1722    11/22/23 1411  [facility] (on-call MD unless specified) Details  Once,   Status:  Canceled        Specialty:  Hospitalist  Provider:  Jeremias Anglin MD    11/22/23 1410    11/22/23 1353  Neurosurgery (on-call MD unless specified)  STAT        Specialty:  Neurosurgery  Provider:  Jill Mccarty APRN    11/22/23 1353    11/22/23 1115  Inpatient Neurology Consult Stroke  STAT        Specialty:  Neurology  Provider:  Pierre Rondon MD    11/22/23 1115                  Procedures     * Surgery not found *    Imaging Results (All)       Procedure Component Value Units Date/Time    CT Head Without Contrast [[id number]] Collected: 11/22/23 1421     Updated: 11/24/23 1217    Narrative:      CT OF THE BRAIN WITHOUT CONTRAST 11/22/2023     HISTORY: Blurred vision. Headache.     Axial images were obtained through the brain without intravenous  contrast.     There is a large hyperdense and partially calcified left supratentorial  mass which abuts the posterior falx and leftward aspect of the  tentorium. The mass is  seen in the posterior parieto-occipital region.  It is difficult to determine if it is extra-axial or intra-axial. The  mass measures up to 7.6 cm in craniocaudal dimension x 7.4 cm in AP  dimension x 5.9 cm in transverse dimension. Small amount of surrounding  edema is seen.     No other masses are seen. There is approximately 8 mm midline shift to  the right. No intracranial hemorrhage is seen.     Ventricles do not appear significantly dilated. No bony lesions are  seen.       Impression:      1. Large left parieto-occipital supratentorial hyperdense and partially  calcified mass as discussed above.  2. This mass could conceivably represent a meningioma. Further  evaluation recommended with MRI of the brain with contrast.  3. Also correlation to any prior outside studies would be helpful.  4. Findings were discussed with the ER physician.     Radiation dose reduction techniques were utilized, including automated  exposure control and exposure modulation based on body size.        This report was finalized on 11/24/2023 12:14 PM by Dr. Rajan Lane M.D on Workstation: [id number]       CT Chest With Contrast Diagnostic [[id number]] Collected: 11/24/23 1024     Updated: 11/24/23 1038    Narrative:      CT CHEST W CONTRAST DIAGNOSTIC-, CT ABDOMEN PELVIS W CONTRAST-     Radiation dose reduction techniques were utilized, including automated  exposure control and exposure modulation based on body size.     Clinical: Brain mass, evaluate for primary source     FINDINGS:  1. Cardiac size within normal limits. Subtle diffuse wall thickening of  the distal one third of the esophagus perhaps related to esophagitis.  Caliber of the esophagus is normal. No mediastinal or hilar  mass/lymphadenopathy. Diameter of the ascending thoracic aorta upper  limits of normal at 3.8 cm. There is biapical pleural thickening scar  formation which is symmetric and benign-appearing. No pleural effusion,  active airspace disease, vascular  congestion or suspicious pulmonary  lesion.     2. The liver, gallbladder, pancreas, spleen, adrenal glands and kidneys  are normal. No calculus or obstructive uropathy, no biliary duct  dilatation. Diameter of the aorta is within normal limits. The urinary  bladder is typical in appearance. Prostate and seminal vesicles  unremarkable        3. The stomach, appendix, colon and small bowel have a satisfactory  appearance. No induration of the mesentery to suggest an inflammatory  process. No wall thickening seen to suggest underlying neoplasm. No free  air or free intraperitoneal fluid. No lytic or sclerotic bone lesion  seen.     CONCLUSION: There is no indication of primary lesion involving the  thorax, abdomen or pelvis. No indication of metastasis. No adenopathy  seen.              This report was finalized on 11/24/2023 10:35 AM by Dr. Jessee Pearson M.D on Workstation: [id number]       CT Abdomen Pelvis With Contrast [[id number]] Collected: 11/24/23 1024     Updated: 11/24/23 1038    Narrative:      CT CHEST W CONTRAST DIAGNOSTIC-, CT ABDOMEN PELVIS W CONTRAST-     Radiation dose reduction techniques were utilized, including automated  exposure control and exposure modulation based on body size.     Clinical: Brain mass, evaluate for primary source     FINDINGS:  1. Cardiac size within normal limits. Subtle diffuse wall thickening of  the distal one third of the esophagus perhaps related to esophagitis.  Caliber of the esophagus is normal. No mediastinal or hilar  mass/lymphadenopathy. Diameter of the ascending thoracic aorta upper  limits of normal at 3.8 cm. There is biapical pleural thickening scar  formation which is symmetric and benign-appearing. No pleural effusion,  active airspace disease, vascular congestion or suspicious pulmonary  lesion.     2. The liver, gallbladder, pancreas, spleen, adrenal glands and kidneys  are normal. No calculus or obstructive uropathy, no biliary duct  dilatation. Diameter  of the aorta is within normal limits. The urinary  bladder is typical in appearance. Prostate and seminal vesicles  unremarkable        3. The stomach, appendix, colon and small bowel have a satisfactory  appearance. No induration of the mesentery to suggest an inflammatory  process. No wall thickening seen to suggest underlying neoplasm. No free  air or free intraperitoneal fluid. No lytic or sclerotic bone lesion  seen.     CONCLUSION: There is no indication of primary lesion involving the  thorax, abdomen or pelvis. No indication of metastasis. No adenopathy  seen.              This report was finalized on 11/24/2023 10:35 AM by Dr. Jessee Pearson M.D on Workstation: [id number]       MRI Brain With & Without Contrast [[id number]] Collected: 11/22/23 1639     Updated: 11/22/23 1644    Narrative:      MRI OF THE BRAIN WITH AND WITHOUT CONTRAST     CLINICAL HISTORY: Intracranial mass.     TECHNIQUE: MRI of the brain was obtained with sagittal T1, axial  pre-gadolinium and postgadolinium T1, axial postgadolinium MPRAGE,  coronal postgadolinium T1,  axial FLAIR, coronal FLAIR, axial T2,  coronal T2, axial susceptibility weighted, and axial diffusion-weighted  images.     COMPARISON: CT head of the same day.     FINDINGS:     There is a large partially calcified dural-based mass with a broad base  with respect to the left aspect of the falx cerebri medial to the left  parietal and occipital lobes. The mass measures up to approximately 7.6  cm in greatest craniocaudad dimensions, 5.8 cm in greatest transverse  dimensions, and 8.2 cm in greatest AP dimensions. This mass abuts the  medial aspect of the superior portion of the left tentorial leaf and may  have a broad base with respect to the tentorial leaf as well. The falx  cerebri is bowed to the right and although the tumor is felt to be all  to the left of the falx cerebri, a small component along the right  aspect of the falx cerebri medial to the right occipital lobe  cannot be  excluded. This lesion results in marked mass effect upon the left  parietal and occipital lobes with sulcal effacement. There is herniation  of a portion of the posterior left temporal lobe through the tentorial  incisura. Additionally, there is effacement of the posterior aspect of  the left lateral ventricle. There is shift of the midline structures to  the right by approximately 8 mm. A small amount of vasogenic edema is  identified within the left parietal and occipital lobes extending to the  posterior aspect of the left temporal lobe.     The ventricles, sulci, and cisterns are age-appropriate. There are mild  changes of chronic small vessel ischemic phenomena. The major  intracranial flow-related signal voids are within normal limits. There  are no abnormal foci of restricted diffusion. There are no abnormal foci  of susceptibility artifact.       Impression:         There is a large partially calcified dural-based mass with a broad base  with respect to the left aspect of the falx cerebri medial to the left  parietal and occipital lobes. The mass measures up to 7.6 x 5.8 x 8.2 cm  in greatest dimensions and abuts the medial aspect of the superior  portion of the left tentorial leaf with which it may have an attachment  as well. The falx cerebri is bowed to the right and a small component  along the right aspect of the falx cerebri medial to the right occipital  lobe cannot be entirely excluded. There is marked mass effect upon the  medial aspect of the left parietal and occipital lobes with marked  sulcal effacement as well as ventricular effacement of the posterior  aspect of the left lateral ventricle. There is shift of the midline  structures to the right by approximately 8 mm. A small amount of  vasogenic edema is seen within the left parietal and occipital lobes and  this extends into the posterior aspect of the left temporal lobe. There  is herniation of a small focus of the posterior and  medial portion of  the left temporal lobe through the tentorial incisura. Again, this  lesion is most likely representative of a large meningioma.     This report was finalized on 11/22/2023 4:41 PM by Dr. Trenton Feliciano M.D  on Workstation: [id number]               Results for orders placed during the hospital encounter of 11/22/23    Adult Transthoracic Echo Complete W/ Cont if Necessary Per Protocol    Interpretation Summary    Left ventricular systolic function is hyperdynamic (EF > 70%).    Left ventricular diastolic function was normal.    Systolic anterior motion of the chordal apparatus is present. A gradient is present at rest and present during Valsalva maneuver. The LVOT pressure gradient at rest is 17 mmHg. The LVOT pressure gradient with provocation is 32 mmHg.  Hyperdynamic left ventricle contributing to elevated gradient.    Sclerotic and calcified aortic valve with mild to moderate aortic valve regurgitation noted.  No significant stenosis present.    Pertinent Labs     Results from last 7 days   Lab Units 11/27/23  0549 11/26/23 0447 11/25/23  0510 11/24/23  0535   WBC 10*3/mm3 13.71* 12.23* 14.94* 12.74*   HEMOGLOBIN g/dL 15.6 14.4 15.5 14.2   PLATELETS 10*3/mm3 263 218 216 199     Results from last 7 days   Lab Units 11/27/23  0549 11/26/23 0447 11/25/23  0511 11/24/23  0535   SODIUM mmol/L 140 141 140 140   POTASSIUM mmol/L 3.5 3.8 3.7 3.6   CHLORIDE mmol/L 108* 108* 109* 109*   CO2 mmol/L 19.5* 22.0 18.6* 20.0*   BUN mg/dL 19 21 17 17   CREATININE mg/dL 0.86 0.78 0.79 0.75*   GLUCOSE mg/dL 141* 152* 147* 147*   EGFR mL/min/1.73 97.9 100.8 100.4 102.0     Results from last 7 days   Lab Units 11/23/23  0425 11/22/23  1129   ALBUMIN g/dL 4.4 4.7   BILIRUBIN mg/dL  --  0.8   ALK PHOS U/L  --  78   AST (SGOT) U/L  --  19   ALT (SGPT) U/L  --  21     Results from last 7 days   Lab Units 11/27/23  0549 11/26/23  0447 11/25/23  0511 11/24/23  0535 11/23/23  0425 11/22/23  1129   CALCIUM mg/dL 8.3* 8.4*  "8.7 8.6 9.0 8.9   ALBUMIN g/dL  --   --   --   --  4.4 4.7   MAGNESIUM mg/dL  --   --   --   --   --  2.2   PHOSPHORUS mg/dL  --   --   --   --  2.0*  --        Results from last 7 days   Lab Units 11/22/23  1129   HSTROP T ng/L 8           Invalid input(s): \"LDLCALC\"  Results from last 7 days   Lab Units 11/22/23  1318   URINECX  >100,000 CFU/mL Escherichia coli*         Test Results Pending at Discharge       Discharge Details        Discharge Medications        New Medications        Instructions Start Date   amLODIPine 10 MG tablet  Commonly known as: NORVASC   10 mg, Oral, Every 24 Hours Scheduled   Start Date: November 28, 2023     dexAMETHasone 4 MG tablet  Commonly known as: DECADRON   4 mg, Oral, Every 8 Hours      famotidine 20 MG tablet  Commonly known as: Pepcid   20 mg, Oral, 2 Times Daily      levETIRAcetam 500 MG tablet  Commonly known as: KEPPRA   500 mg, Oral, 2 Times Daily      losartan 50 MG tablet  Commonly known as: COZAAR   50 mg, Oral, Every 24 Hours Scheduled   Start Date: November 28, 2023     metoprolol succinate XL 25 MG 24 hr tablet  Commonly known as: TOPROL-XL   25 mg, Oral, Every 24 Hours Scheduled   Start Date: November 28, 2023     spironolactone 25 MG tablet  Commonly known as: ALDACTONE   25 mg, Oral, Daily   Start Date: November 28, 2023              No Known Allergies    Discharge Disposition:  Home or Self Care      Discharge Diet:  Diet Order   Procedures    Diet: Cardiac Diets; Healthy Heart (2-3 Na+); Texture: Regular Texture (IDDSI 7); Fluid Consistency: Thin (IDDSI 0)       Discharge Activity:   Activity Instructions       Gradually Increase Activity Until at Pre-Hospitalization Level      Up WIth Assist              CODE STATUS:    Code Status and Medical Interventions:   Ordered at: 11/22/23 1601     Level Of Support Discussed With:    Patient     Code Status (Patient has no pulse and is not breathing):    CPR (Attempt to Resuscitate)     Medical Interventions (Patient " has pulse or is breathing):    Full       No future appointments.  Additional Instructions for the Follow-ups that You Need to Schedule       Discharge Follow-up with PCP   As directed       Currently Documented PCP:    Provider, No Known    PCP Phone Number:    [phone]     Follow Up Details: Within 1 week after discharge for reassessment, medication and symptom review, blood pressure check, blood glucose check, BMP and CBC        Discharge Follow-up with Specialty: Neurosurgery   As directed      Specialty: Neurosurgery   Follow Up Details: Tomorrow morning (11/28) at 0930 with [facility] neurosurgery               Follow-up Information       Provider, No Known .    Why: Within 1 week after discharge for reassessment, medication and symptom review, blood pressure check, blood glucose check, BMP and CBC  Contact information:  [place] [address]  [phone]                             Additional Instructions for the Follow-ups that You Need to Schedule       Discharge Follow-up with PCP   As directed       Currently Documented PCP:    Provider, No Known    PCP Phone Number:    [phone]     Follow Up Details: Within 1 week after discharge for reassessment, medication and symptom review, blood pressure check, blood glucose check, BMP and CBC        Discharge Follow-up with Specialty: Neurosurgery   As directed      Specialty: Neurosurgery   Follow Up Details: Tomorrow morning (11/28) at 0930 with [facility] neurosurgery            Time Spent on Discharge:  Greater than 30 minutes      Mikel Leonard DO  [facility]  11/27/23  15:59 EST

## 2023-11-27 NOTE — TELEPHONE ENCOUNTER
Due to insurance, patient is being referred to [facility] for treatment.  Called Dr Kushal Cotton for process and was told to faxed over for reviewed. Manually faxed 11-

## 2023-11-27 NOTE — TELEPHONE ENCOUNTER
----- Message from Kike Rodriguez MD sent at 11/27/2023 10:55 AM EST -----  NPs are getting financial services to see him today and they are going to talk to him about cost and options.  I am going to talk to Todnem about scheduling angio/crani next week or he might have to take over while I am out of town if the patient will not DC.  So hold off until you hear from NPs today.  ----- Message -----  From: Stefania Mak MA  Sent: 11/27/2023   8:20 AM EST  To: Kike Rodriguez MD    Am I scheduling follow-up?  ----- Message -----  From: Esthela Randhawa APRN  Sent: 11/26/2023  12:59 PM EST  To: Stefania Mak MA    Just heads up: His insurance is not covered for [facility]. He was going to possibly have surgery on 11/27. Due to the insurance may have to follow-up with outpatient visit with JEIMY Cotton. I talked to the patient and he wanted to stay with [facility] and have surgery here. I am not sure what will be the decision, patient may need f/u in office with Dr. Rodriguez.   I have talked with both Dr. Rodriguez and hospitaltist today.

## 2023-11-27 NOTE — PLAN OF CARE
Goal Outcome Evaluation:           Progress: improving     BP stable on cardene gtt.  Pt aware of need to control BP with PO meds before surgical intervention.  No significant change noted this shift.

## 2023-11-27 NOTE — NURSING NOTE
AVS completed and discharge education provided to patient. Patient states his wallet is at home and there is no one that can pay over phone for his prescriptions. He further stated to discharge him and he will come back to pick his prescriptions up. Patient informed outpatient pharmacy closes at 6:00pm.

## 2023-11-27 NOTE — PROGRESS NOTES
"[facility]    Patient Name: Chato Potts  :1960  63 y.o.  LOS: 5  Encounter Provider: Chato Brown Jr, MD      Patient Care Team:  Provider, No Known as PCP - General    Chief Complaint: Follow-up hypertensive emergency, brain mass with focal neurological deficits    Interval History: No acute issues overnight.  Off Cardene drip.  Labile blood pressure with much better control.       Objective   Vital Signs  Temp:  [98.1 °F (36.7 °C)-98.8 °F (37.1 °C)] 98.1 °F (36.7 °C)  Heart Rate:  [] 105  Resp:  [18] 18  BP: (110-173)/() 154/85    Intake/Output Summary (Last 24 hours) at 2023 1414  Last data filed at 2023 1300  Gross per 24 hour   Intake 927 ml   Output 2740 ml   Net -1813 ml     Flowsheet Rows      Flowsheet Row First Filed Value   Admission Height 188 cm (74\") Documented at 2023 1033   Admission Weight 102 kg (225 lb) Documented at 2023 1033              Vitals reviewed.   Constitutional:       Appearance: Healthy appearance. Not in distress.   Neck:      Vascular: No JVR. JVD normal.   Pulmonary:      Effort: Pulmonary effort is normal.      Breath sounds: Normal breath sounds. No wheezing. No rhonchi. No rales.   Chest:      Chest wall: Not tender to palpatation.   Cardiovascular:      PMI at left midclavicular line. Normal rate. Regular rhythm. Normal S1. Normal S2.       Murmurs: There is no murmur.      No gallop.  No click. No rub.   Pulses:     Intact distal pulses.   Edema:     Peripheral edema absent.   Abdominal:      General: Bowel sounds are normal.      Palpations: Abdomen is soft.      Tenderness: There is no abdominal tenderness.   Musculoskeletal: Normal range of motion.         General: No tenderness. Skin:     General: Skin is warm and dry.   Neurological:      General: No focal deficit present.      Mental Status: Alert and oriented to person, place and time.           Pertinent Test Results:  Results from last 7 days   Lab " "Units 11/27/23  0549 11/26/23  0447 11/25/23  0511 11/24/23  0535 11/23/23  0425 11/22/23  1129   SODIUM mmol/L 140 141 140 140 141 139   POTASSIUM mmol/L 3.5 3.8 3.7 3.6 4.2 3.3*   CHLORIDE mmol/L 108* 108* 109* 109* 108* 105   CO2 mmol/L 19.5* 22.0 18.6* 20.0* 20.9* 23.0   BUN mg/dL 19 21 17 17 13 14   CREATININE mg/dL 0.86 0.78 0.79 0.75* 0.99 1.03   GLUCOSE mg/dL 141* 152* 147* 147* 156* 97   CALCIUM mg/dL 8.3* 8.4* 8.7 8.6 9.0 8.9   AST (SGOT) U/L  --   --   --   --   --  19   ALT (SGPT) U/L  --   --   --   --   --  21     Results from last 7 days   Lab Units 11/22/23  1129   HSTROP T ng/L 8     Results from last 7 days   Lab Units 11/27/23  0549 11/26/23  0447 11/25/23  0510 11/24/23  0535 11/22/23  1129   WBC 10*3/mm3 13.71* 12.23* 14.94* 12.74* 4.71   HEMOGLOBIN g/dL 15.6 14.4 15.5 14.2 14.8   HEMATOCRIT % 47.0 43.4 45.2 42.6 43.7   PLATELETS 10*3/mm3 263 218 216 199 175     Results from last 7 days   Lab Units 11/22/23  1129   INR  0.94   APTT seconds 29.8     Results from last 7 days   Lab Units 11/22/23  1129   MAGNESIUM mg/dL 2.2           Invalid input(s): \"LDLCALC\"      Results from last 7 days   Lab Units 11/22/23  1129   TSH uIU/mL 3.960           Medication Review:   amLODIPine, 10 mg, Oral, Q24H  dexAMETHasone, 4 mg, Intravenous, Q6H  famotidine, 20 mg, Intravenous, Q12H  levETIRAcetam, 500 mg, Oral, BID  losartan, 50 mg, Oral, Q24H  metoprolol succinate XL, 25 mg, Oral, Q24H  spironolactone, 25 mg, Oral, Daily              Assessment & Plan     Active Hospital Problems    Diagnosis  POA    **Hypertensive emergency [I16.1]  Yes    Acute cystitis without hematuria [N30.00]  Yes    Brain mass [G93.89]  Yes    Vision disturbance [H53.9]  Yes    Hypokalemia [E87.6]  Yes    Brain compression [G93.5]  Yes    Brain edema [G93.6]  Yes      Resolved Hospital Problems   No resolved problems to display.        Hypertensive emergency -weaned from Cardene drip and is doing well on amlodipine, spironolactone, " losartan and metoprolol succinate.  Occipital mass -with focal deficits currently on dexamethasone planning for outpatient surgical excision at [facility].  UTI  Right bundle branch block    Cardiovascular issues are stable.  He will need to work on optimizing afterload reduction with his primary care physician prior to surgical intervention.  Okay for discharge at any point from cardiovascular standpoint.      Chato Brown Jr, MD  [facility]  11/27/23  14:14 EST

## 2023-11-27 NOTE — TELEPHONE ENCOUNTER
----- Message from JACQUELINE Arroyo sent at 11/26/2023 12:56 PM EST -----  Just heads up: His insurance is not covered for [facility]. He was going to possibly have surgery on 11/27. Due to the insurance may have to follow-up with outpatient visit with JEIMY Cotton. I talked to the patient and he wanted to stay with [facility] and have surgery here. I am not sure what will be the decision, patient may need f/u in office with Dr. Rodriguez.   I have talked with both Dr. oRdriguez and hospitaltist today.

## 2023-11-27 NOTE — PROGRESS NOTES
Events of the long holiday weekend are noted as I was consulted on this patient late in the afternoon on Wednesday, November 22 for visual disturbance and a CT Head followed by MRI brain that documented a large deeply enhancing well-circumscribed left occipital mass.  His systemic workup ruled out systemic malignancy.      Patient was admitted for malignant hypertension and has continued to have very labile blood pressure, even this afternoon, with a systolic blood pressure documented at 175.  He remains on a Cardene drip.  Cardiology has cleared him for surgery from the cardiac standpoint but his SBP is still quite labile.  I came in this evening to review the workup with the patient and at this point with the labile blood pressure and stable symptomatology with what appears to be a very longstanding chronic brain mass urgent surgery tomorrow is not appropriate.  Once his blood pressure is more consistently controlled off IV meds he will need a cerebral angiogram with possible embolization to reduce the risk of life-threatening hemorrhage coordinated with and prior to tumor resection.  He may have to be discharged (once his SBP is controlled off Cardene) on low dose Decadron and Keppra to schedule the procedures in the near future.      Tomorrow we will work on the coordination of those treatments, however, our service was also notified last week that the patient's insurance is not in network with [facility] and we will ask the financial services team to discuss this in more detail with the patient tomorrow.

## 2023-11-27 NOTE — PLAN OF CARE
Problem: Adult Inpatient Plan of Care  Goal: Plan of Care Review  Outcome: Met  Goal: Patient-Specific Goal (Individualized)  Outcome: Met  Goal: Absence of Hospital-Acquired Illness or Injury  Outcome: Met  Goal: Optimal Comfort and Wellbeing  Outcome: Met  Goal: Readiness for Transition of Care  Outcome: Met     Problem: Hypertension Acute  Goal: Blood Pressure Within Desired Range  Outcome: Met   Goal Outcome Evaluation:

## 2023-11-27 NOTE — NURSING NOTE
Pt wants to leave AMA, notified DR Leonard, called DR Brown cardiology to see if there is a change on BP medication stated leave them as it is, notified DR Leonard stated working on pt discharge

## 2023-11-28 ENCOUNTER — READMISSION MANAGEMENT (OUTPATIENT)
Dept: CALL CENTER | Facility: HOSPITAL | Age: 63
End: 2023-11-28
Payer: COMMERCIAL

## 2023-11-28 NOTE — OUTREACH NOTE
Prep Survey      Flowsheet Row Responses   [facility] facility patient discharged from? [facility]   Is LACE score < 7 ? No   Eligibility Readm Mgmt   Discharge diagnosis Hypertensive Emergency,Brain Mass,Acute cystitis   Does the patient have one of the following disease processes/diagnoses(primary or secondary)? Other   Does the patient have Home health ordered? No   Is there a DME ordered? No   Comments regarding appointments plan outpatient appointment with [facility]   Prep survey completed? Yes            Madelyn LOUISE - Registered Nurse

## 2023-11-28 NOTE — PAYOR COMM NOTE
"Ector Potts (63 y.o. Male)                           ATTENTION; DISCHARGE CASE [id number]                        REPLY TO UR DEPT  [phone] OR CALL             Date of Birth   1960    Social Security Number       Address   [facility] [address] [place] [id number]    Home Phone   [phone]    MRN   [id number]       Roman [employer]   Holiness    Marital Status   Single                            Admission Date   23    Admission Type   Emergency    Admitting Provider   Jeremias Anglin MD    Attending Provider       Department, Room/Bed   [address], S509/       Discharge Date   2023    Discharge Disposition   Home or Self Care    Discharge Destination                                 Attending Provider: (none)   Allergies: No Known Allergies    Isolation: None   Infection: None   Code Status: Prior    Ht: 188 cm (74.02\")   Wt: 106 kg (234 lb 2.1 oz)    Admission Cmt: None   Principal Problem: Hypertensive emergency [I16.1]                   Active Insurance as of 2023       Primary Coverage       Payor Plan Insurance Group Employer/Plan Group    [facility] [employer] [facility] [employer] TRANSITION HMO NON PAR [id number]       Payor Plan Address Payor Plan Phone Number Payor Plan Fax Number Effective Dates    [address]   2023 - None Entered    [facility] [id number]         Subscriber Name Subscriber Birth Date Member ID       ECTOR POTTS 1960 [id number]                     Emergency Contacts        (Rel.) Home Phone Work Phone Mobile Phone    ABIRAYNE CHANG (Brother) -- -- [phone]    ABIALEXANDRA (Brother) -- -- [phone]                 Discharge Summary        Mikel Leonard DO at 23 1559              Patient Name: Ector Potts  : 1960  MRN: [id number]    Date of Admission: 2023  Date of Discharge:  2023  Primary Care Physician: Provider, No Known      Chief Complaint:   Blurred Vision      Discharge " Diagnoses     Active Hospital Problems    Diagnosis  POA    **Hypertensive emergency [I16.1]  Yes    Acute cystitis without hematuria [N30.00]  Yes    Brain mass [G93.89]  Yes    Vision disturbance [H53.9]  Yes    Hypokalemia [E87.6]  Yes    Brain compression [G93.5]  Yes    Brain edema [G93.6]  Yes      Resolved Hospital Problems   No resolved problems to display.        Hospital Course     Mr. Potts is a 63 y.o. male who presented to [facility] initially complaining of altered vision.  Please see the admitting history and physical for further details.  He was admitted to the hospital for further evaluation and treatment.      Hypertensive Emergency  - Patient noted to have SBP >200 and DBP >120 on arrival, coupled with papilledema. Findings consistent with hypertensive emergency. 2D Echo (11/23/23) showing Left ventricular systolic function is hyperdynamic (EF > 70%), LVOT pressure gradient 17 at rest and 32 with provocation, likely a result of hyperdynamic left ventricle. Also noting mild to moderate aortic regurgitation. He was treated with Cardene drip, in addition to starting oral medication therapy, which led to improvement in blood pressure, eventually allowing for Cardene drip to be discontinued. Cardiology was consulted and evaluated, saw patient prior to discharge and cleared him for discharge from their perspective with recommendation of short PCP follow up appointment. Discharge medication plan: Amlodipine, Losartan, Metoprolol and Spironolactone as prescribed (see orders). Recommend that patient check his blood pressure 2-3 times daily and record those values in log book and take with him to next PCP visit to allow for greater insight into treatment efficacy to guide further management decisions. Recommend heart healthy/low sodium diet. Recommend close follow up with PCP within 1 week after discharge for reassessment to guide ongoing management decisions.    Brain Mass  - MRI brain  "obtained and noted \"large partially calcified dural-based mass with a broad base with respect to the left aspect of the falx cerebri medial to the left   parietal and occipital lobes\" with mass effect upon the medial aspect of the left parietal and occipital lobes with marked sulcal effacement and ventricular effacement of the posterior aspect of the lateral left ventricle, covered with 8 mm of midline shift to the right and small amount of vasogenic edema. Imaging findings coupled with patient's endorsement of visual field deficits and RUE ataxia. - CT C/A/P obtained, however, no evidence of findings concerning for malignant process. Neurosurgery consulted and followed. He was treated with Decadron.There was discussion yesterday regarding that patient is out of network and decision regarding plans for surgical intervention here versus outpatient follow-up discussed with Neurosurgery, they decided that patient should be discharged and follow up with Neurosurgery service at [facility]. They arranged for patient to have outpatient appointment with [facility] Neurosurgery service on 11/28 at 0930. Discharge medication plan: Dexamethasone 4 mg every 8 hours, will provide 5-day course, discussed with our neurosurgery team, dexamethasone as above prescribed per their recommendations, they state that [facility] neurosurgery will assume management of steroids after discharge. Continue treatment with Keppra for seizure prophylaxis and H2 blocker for GI prophylaxis, as patient was receiving in hospital. Follow up with Neurosurgery as scheduled.    Acute cystitis without hematuria  - Patient presenting with symptoms of dysuria coupled with UA findings showing 2+ leukocyte esterase, TNTC WBC, 2+ bacteria.  He is currently on ceftriaxone, urine culture showing >100K E. Coli, pan-sensitive. Completed treatment with Ceftriaxone prior to discharge. Recommend close follow up with PCP within 1 week after discharge for reassessment to guide " ongoing management decisions.     Leukocytosis  - WBC count elevated on most recent labs, however, improved overall. Etiology of WBC elevation likely a result of infection + corticosteroids. Recommend repeat CBC with PCP within 1 week for reassessment.    Hyperglycemia  - Glucose elevated on  labs intermittently. Patient without known history of T2DM.  Etiology likely a result of corticosteroids. Hemoglobin A1c 5.50% (11/25/23). Recommend that patient check his blood glucose 2-3 times daily and record those values in log book and take with him to next PCP visit to allow for greater insight into treatment efficacy to guide further management decisions.     Hypokalemia, resolved  - Potassium noted to be low previously, repleted per electrolyte protocol and improved on most recent testing. Recommend repeat level check with PCP within 1 week for reassessment.      Day of Discharge     Subjective:  Blood pressure improved and stable without Cardene drip, evaluated by cardiology and cleared for discharge from their perspective.  Evaluated by neurosurgery, they have arranged for patient to have outpatient follow-up with neurosurgery at [facility] tomorrow morning at 09 30.    Physical Exam:  Temp:  [98.1 °F (36.7 °C)-98.8 °F (37.1 °C)] 98.1 °F (36.7 °C)  Heart Rate:  [] 105  Resp:  [18] 18  BP: (110-173)/() 154/85  Body mass index is 30.05 kg/m².  Physical Exam  Vitals and nursing note reviewed.   Constitutional:       General: He is awake. He is not in acute distress.     Appearance: He is overweight. He is obese.   Eyes:      General: No scleral icterus.     Conjunctiva/sclera: Conjunctivae normal.   Cardiovascular:      Rate and Rhythm: Normal rate and regular rhythm.      Pulses: Normal pulses.      Heart sounds: Normal heart sounds.   Pulmonary:      Effort: Pulmonary effort is normal. No respiratory distress.      Breath sounds: Normal breath sounds.   Abdominal:      General: Bowel sounds are normal.       Palpations: Abdomen is soft.      Tenderness: There is no abdominal tenderness.   Skin:     General: Skin is warm and dry.   Neurological:      General: No focal deficit present.      Mental Status: He is alert.   Psychiatric:         Behavior: Behavior is cooperative.         Consultants     Consult Orders (all) (From admission, onward)       Start     Ordered    11/25/23 1516  Inpatient Cardiology Consult  Once        Comments: Spoke to Dr. Robles he is going to see patient in AM on 11/26.   Specialty:  Cardiology  Provider:  Amol Robles MD    11/25/23 1515    11/22/23 1722  Inpatient Case Management  Consult  Once        Provider:  (Not yet assigned)    11/22/23 1722    11/22/23 1411  [facility] (on-call MD unless specified) Details  Once,   Status:  Canceled        Specialty:  Hospitalist  Provider:  Jeremias Anglin MD    11/22/23 1410    11/22/23 1353  Neurosurgery (on-call MD unless specified)  STAT        Specialty:  Neurosurgery  Provider:  Jill Mccarty APRN    11/22/23 1353    11/22/23 1115  Inpatient Neurology Consult Stroke  STAT        Specialty:  Neurology  Provider:  Pierre Rondon MD    11/22/23 1115                  Procedures     * Surgery not found *    Imaging Results (All)       Procedure Component Value Units Date/Time    CT Head Without Contrast [[id number]] Collected: 11/22/23 1421     Updated: 11/24/23 1217    Narrative:      CT OF THE BRAIN WITHOUT CONTRAST 11/22/2023     HISTORY: Blurred vision. Headache.     Axial images were obtained through the brain without intravenous  contrast.     There is a large hyperdense and partially calcified left supratentorial  mass which abuts the posterior falx and leftward aspect of the  tentorium. The mass is seen in the posterior parieto-occipital region.  It is difficult to determine if it is extra-axial or intra-axial. The  mass measures up to 7.6 cm in craniocaudal dimension x 7.4 cm in AP  dimension x 5.9 cm in  transverse dimension. Small amount of surrounding  edema is seen.     No other masses are seen. There is approximately 8 mm midline shift to  the right. No intracranial hemorrhage is seen.     Ventricles do not appear significantly dilated. No bony lesions are  seen.       Impression:      1. Large left parieto-occipital supratentorial hyperdense and partially  calcified mass as discussed above.  2. This mass could conceivably represent a meningioma. Further  evaluation recommended with MRI of the brain with contrast.  3. Also correlation to any prior outside studies would be helpful.  4. Findings were discussed with the ER physician.     Radiation dose reduction techniques were utilized, including automated  exposure control and exposure modulation based on body size.        This report was finalized on 11/24/2023 12:14 PM by Dr. Rajan Lane M.D on Workstation: [id number]       CT Chest With Contrast Diagnostic [[id number]] Collected: 11/24/23 1024     Updated: 11/24/23 1038    Narrative:      CT CHEST W CONTRAST DIAGNOSTIC-, CT ABDOMEN PELVIS W CONTRAST-     Radiation dose reduction techniques were utilized, including automated  exposure control and exposure modulation based on body size.     Clinical: Brain mass, evaluate for primary source     FINDINGS:  1. Cardiac size within normal limits. Subtle diffuse wall thickening of  the distal one third of the esophagus perhaps related to esophagitis.  Caliber of the esophagus is normal. No mediastinal or hilar  mass/lymphadenopathy. Diameter of the ascending thoracic aorta upper  limits of normal at 3.8 cm. There is biapical pleural thickening scar  formation which is symmetric and benign-appearing. No pleural effusion,  active airspace disease, vascular congestion or suspicious pulmonary  lesion.     2. The liver, gallbladder, pancreas, spleen, adrenal glands and kidneys  are normal. No calculus or obstructive uropathy, no biliary duct  dilatation. Diameter of  the aorta is within normal limits. The urinary  bladder is typical in appearance. Prostate and seminal vesicles  unremarkable        3. The stomach, appendix, colon and small bowel have a satisfactory  appearance. No induration of the mesentery to suggest an inflammatory  process. No wall thickening seen to suggest underlying neoplasm. No free  air or free intraperitoneal fluid. No lytic or sclerotic bone lesion  seen.     CONCLUSION: There is no indication of primary lesion involving the  thorax, abdomen or pelvis. No indication of metastasis. No adenopathy  seen.              This report was finalized on 11/24/2023 10:35 AM by Dr. Jessee Pearson M.D on Workstation: [id number]       CT Abdomen Pelvis With Contrast [[id number]] Collected: 11/24/23 1024     Updated: 11/24/23 1038    Narrative:      CT CHEST W CONTRAST DIAGNOSTIC-, CT ABDOMEN PELVIS W CONTRAST-     Radiation dose reduction techniques were utilized, including automated  exposure control and exposure modulation based on body size.     Clinical: Brain mass, evaluate for primary source     FINDINGS:  1. Cardiac size within normal limits. Subtle diffuse wall thickening of  the distal one third of the esophagus perhaps related to esophagitis.  Caliber of the esophagus is normal. No mediastinal or hilar  mass/lymphadenopathy. Diameter of the ascending thoracic aorta upper  limits of normal at 3.8 cm. There is biapical pleural thickening scar  formation which is symmetric and benign-appearing. No pleural effusion,  active airspace disease, vascular congestion or suspicious pulmonary  lesion.     2. The liver, gallbladder, pancreas, spleen, adrenal glands and kidneys  are normal. No calculus or obstructive uropathy, no biliary duct  dilatation. Diameter of the aorta is within normal limits. The urinary  bladder is typical in appearance. Prostate and seminal vesicles  unremarkable        3. The stomach, appendix, colon and small bowel have a  satisfactory  appearance. No induration of the mesentery to suggest an inflammatory  process. No wall thickening seen to suggest underlying neoplasm. No free  air or free intraperitoneal fluid. No lytic or sclerotic bone lesion  seen.     CONCLUSION: There is no indication of primary lesion involving the  thorax, abdomen or pelvis. No indication of metastasis. No adenopathy  seen.              This report was finalized on 11/24/2023 10:35 AM by Dr. Jessee Pearson M.D on Workstation: [id number]       MRI Brain With & Without Contrast [[id number]] Collected: 11/22/23 1639     Updated: 11/22/23 1644    Narrative:      MRI OF THE BRAIN WITH AND WITHOUT CONTRAST     CLINICAL HISTORY: Intracranial mass.     TECHNIQUE: MRI of the brain was obtained with sagittal T1, axial  pre-gadolinium and postgadolinium T1, axial postgadolinium MPRAGE,  coronal postgadolinium T1,  axial FLAIR, coronal FLAIR, axial T2,  coronal T2, axial susceptibility weighted, and axial diffusion-weighted  images.     COMPARISON: CT head of the same day.     FINDINGS:     There is a large partially calcified dural-based mass with a broad base  with respect to the left aspect of the falx cerebri medial to the left  parietal and occipital lobes. The mass measures up to approximately 7.6  cm in greatest craniocaudad dimensions, 5.8 cm in greatest transverse  dimensions, and 8.2 cm in greatest AP dimensions. This mass abuts the  medial aspect of the superior portion of the left tentorial leaf and may  have a broad base with respect to the tentorial leaf as well. The falx  cerebri is bowed to the right and although the tumor is felt to be all  to the left of the falx cerebri, a small component along the right  aspect of the falx cerebri medial to the right occipital lobe cannot be  excluded. This lesion results in marked mass effect upon the left  parietal and occipital lobes with sulcal effacement. There is herniation  of a portion of the posterior left  temporal lobe through the tentorial  incisura. Additionally, there is effacement of the posterior aspect of  the left lateral ventricle. There is shift of the midline structures to  the right by approximately 8 mm. A small amount of vasogenic edema is  identified within the left parietal and occipital lobes extending to the  posterior aspect of the left temporal lobe.     The ventricles, sulci, and cisterns are age-appropriate. There are mild  changes of chronic small vessel ischemic phenomena. The major  intracranial flow-related signal voids are within normal limits. There  are no abnormal foci of restricted diffusion. There are no abnormal foci  of susceptibility artifact.       Impression:         There is a large partially calcified dural-based mass with a broad base  with respect to the left aspect of the falx cerebri medial to the left  parietal and occipital lobes. The mass measures up to 7.6 x 5.8 x 8.2 cm  in greatest dimensions and abuts the medial aspect of the superior  portion of the left tentorial leaf with which it may have an attachment  as well. The falx cerebri is bowed to the right and a small component  along the right aspect of the falx cerebri medial to the right occipital  lobe cannot be entirely excluded. There is marked mass effect upon the  medial aspect of the left parietal and occipital lobes with marked  sulcal effacement as well as ventricular effacement of the posterior  aspect of the left lateral ventricle. There is shift of the midline  structures to the right by approximately 8 mm. A small amount of  vasogenic edema is seen within the left parietal and occipital lobes and  this extends into the posterior aspect of the left temporal lobe. There  is herniation of a small focus of the posterior and medial portion of  the left temporal lobe through the tentorial incisura. Again, this  lesion is most likely representative of a large meningioma.     This report was finalized on  11/22/2023 4:41 PM by Dr. Trenton Feliciano M.D  on Workstation: [id number]               Results for orders placed during the hospital encounter of 11/22/23    Adult Transthoracic Echo Complete W/ Cont if Necessary Per Protocol    Interpretation Summary    Left ventricular systolic function is hyperdynamic (EF > 70%).    Left ventricular diastolic function was normal.    Systolic anterior motion of the chordal apparatus is present. A gradient is present at rest and present during Valsalva maneuver. The LVOT pressure gradient at rest is 17 mmHg. The LVOT pressure gradient with provocation is 32 mmHg.  Hyperdynamic left ventricle contributing to elevated gradient.    Sclerotic and calcified aortic valve with mild to moderate aortic valve regurgitation noted.  No significant stenosis present.    Pertinent Labs     Results from last 7 days   Lab Units 11/27/23  0549 11/26/23 0447 11/25/23  0510 11/24/23  0535   WBC 10*3/mm3 13.71* 12.23* 14.94* 12.74*   HEMOGLOBIN g/dL 15.6 14.4 15.5 14.2   PLATELETS 10*3/mm3 263 218 216 199     Results from last 7 days   Lab Units 11/27/23  0549 11/26/23  0447 11/25/23  0511 11/24/23  0535   SODIUM mmol/L 140 141 140 140   POTASSIUM mmol/L 3.5 3.8 3.7 3.6   CHLORIDE mmol/L 108* 108* 109* 109*   CO2 mmol/L 19.5* 22.0 18.6* 20.0*   BUN mg/dL 19 21 17 17   CREATININE mg/dL 0.86 0.78 0.79 0.75*   GLUCOSE mg/dL 141* 152* 147* 147*   EGFR mL/min/1.73 97.9 100.8 100.4 102.0     Results from last 7 days   Lab Units 11/23/23  0425 11/22/23  1129   ALBUMIN g/dL 4.4 4.7   BILIRUBIN mg/dL  --  0.8   ALK PHOS U/L  --  78   AST (SGOT) U/L  --  19   ALT (SGPT) U/L  --  21     Results from last 7 days   Lab Units 11/27/23  0549 11/26/23 0447 11/25/23  0511 11/24/23  0535 11/23/23  0425 11/22/23  1129   CALCIUM mg/dL 8.3* 8.4* 8.7 8.6 9.0 8.9   ALBUMIN g/dL  --   --   --   --  4.4 4.7   MAGNESIUM mg/dL  --   --   --   --   --  2.2   PHOSPHORUS mg/dL  --   --   --   --  2.0*  --        Results from last 7  "days   Lab Units 11/22/23  1129   HSTROP T ng/L 8           Invalid input(s): \"LDLCALC\"  Results from last 7 days   Lab Units 11/22/23  1318   URINECX  >100,000 CFU/mL Escherichia coli*         Test Results Pending at Discharge       Discharge Details        Discharge Medications        New Medications        Instructions Start Date   amLODIPine 10 MG tablet  Commonly known as: NORVASC   10 mg, Oral, Every 24 Hours Scheduled   Start Date: November 28, 2023     dexAMETHasone 4 MG tablet  Commonly known as: DECADRON   4 mg, Oral, Every 8 Hours      famotidine 20 MG tablet  Commonly known as: Pepcid   20 mg, Oral, 2 Times Daily      levETIRAcetam 500 MG tablet  Commonly known as: KEPPRA   500 mg, Oral, 2 Times Daily      losartan 50 MG tablet  Commonly known as: COZAAR   50 mg, Oral, Every 24 Hours Scheduled   Start Date: November 28, 2023     metoprolol succinate XL 25 MG 24 hr tablet  Commonly known as: TOPROL-XL   25 mg, Oral, Every 24 Hours Scheduled   Start Date: November 28, 2023     spironolactone 25 MG tablet  Commonly known as: ALDACTONE   25 mg, Oral, Daily   Start Date: November 28, 2023              No Known Allergies    Discharge Disposition:  Home or Self Care      Discharge Diet:  Diet Order   Procedures    Diet: Cardiac Diets; Healthy Heart (2-3 Na+); Texture: Regular Texture (IDDSI 7); Fluid Consistency: Thin (IDDSI 0)       Discharge Activity:   Activity Instructions       Gradually Increase Activity Until at Pre-Hospitalization Level      Up WIth Assist              CODE STATUS:    Code Status and Medical Interventions:   Ordered at: 11/22/23 1601     Level Of Support Discussed With:    Patient     Code Status (Patient has no pulse and is not breathing):    CPR (Attempt to Resuscitate)     Medical Interventions (Patient has pulse or is breathing):    Full       No future appointments.  Additional Instructions for the Follow-ups that You Need to Schedule       Discharge Follow-up with PCP   As " directed       Currently Documented PCP:    Provider, No Known    PCP Phone Number:    [phone]     Follow Up Details: Within 1 week after discharge for reassessment, medication and symptom review, blood pressure check, blood glucose check, BMP and CBC        Discharge Follow-up with Specialty: Neurosurgery   As directed      Specialty: Neurosurgery   Follow Up Details: Tomorrow morning (11/28) at 0930 with [facility] neurosurgery               Follow-up Information       Provider, No Known .    Why: Within 1 week after discharge for reassessment, medication and symptom review, blood pressure check, blood glucose check, BMP and CBC  Contact information:  [place] [address]  [phone]                             Additional Instructions for the Follow-ups that You Need to Schedule       Discharge Follow-up with PCP   As directed       Currently Documented PCP:    Provider, No Known    PCP Phone Number:    [phone]     Follow Up Details: Within 1 week after discharge for reassessment, medication and symptom review, blood pressure check, blood glucose check, BMP and CBC        Discharge Follow-up with Specialty: Neurosurgery   As directed      Specialty: Neurosurgery   Follow Up Details: Tomorrow morning (11/28) at 0930 with [facility] neurosurgery            Time Spent on Discharge:  Greater than 30 minutes      Mikel Leonard DO  [facility]  11/27/23  15:59 EST                Electronically signed by Mikel Leonard DO at 11/27/23 2406

## 2023-11-29 ENCOUNTER — TELEPHONE (OUTPATIENT)
Dept: CARDIOLOGY | Facility: CLINIC | Age: 63
End: 2023-11-29
Payer: COMMERCIAL

## 2023-11-29 NOTE — TELEPHONE ENCOUNTER
I called the patient to schedule follow up.  He is having surgery at  on 12/11.  I offered him follow up prior to surgery and he pleasantly declined.  He shares with me that his surgeon is aware of his recent hospitalization and BP issues.  He will call us after he recovers from surgery for a follow up appointment.  I asked him to call sooner with issues.  I have given him our contact information.    Kassidy Wolf RN  [facility] T[facility]  11/29/23 13:29 EST

## 2023-12-06 ENCOUNTER — READMISSION MANAGEMENT (OUTPATIENT)
Dept: CALL CENTER | Facility: HOSPITAL | Age: 63
End: 2023-12-06
Payer: COMMERCIAL

## 2023-12-07 NOTE — OUTREACH NOTE
Medical Week 2 Survey      Flowsheet Row Responses   [facility] patient discharged from? [place]   Does the patient have one of the following disease processes/diagnoses(primary or secondary)? Other   Week 2 attempt successful? No   Unsuccessful attempts Attempt 1            Jailyn CLEMONS - Licensed Nurse

## 2023-12-13 ENCOUNTER — READMISSION MANAGEMENT (OUTPATIENT)
Dept: CALL CENTER | Facility: HOSPITAL | Age: 63
End: 2023-12-13
Payer: COMMERCIAL

## 2023-12-13 NOTE — OUTREACH NOTE
Medical Week 3 Survey      Flowsheet Row Responses   [facility] patient discharged from? [place]   Does the patient have one of the following disease processes/diagnoses(primary or secondary)? Other   Week 3 attempt successful? No   Call start time 1124   Unsuccessful attempts Attempt 1   Revoke Readmitted  [Pt was readmitted for scheduled brain surgery-pt is currently in the hospital per brother (Roderick)]   Discharge diagnosis Hypertensive Emergency,Brain Mass,Acute cystitis            Pamela H - Registered Nurse